# Patient Record
Sex: MALE | Race: OTHER | HISPANIC OR LATINO | Employment: FULL TIME | URBAN - METROPOLITAN AREA
[De-identification: names, ages, dates, MRNs, and addresses within clinical notes are randomized per-mention and may not be internally consistent; named-entity substitution may affect disease eponyms.]

---

## 2019-12-16 LAB
LEFT EYE DIABETIC RETINOPATHY: NORMAL
RIGHT EYE DIABETIC RETINOPATHY: NORMAL

## 2020-03-18 ENCOUNTER — OFFICE VISIT (OUTPATIENT)
Dept: FAMILY MEDICINE CLINIC | Facility: CLINIC | Age: 50
End: 2020-03-18
Payer: COMMERCIAL

## 2020-03-18 VITALS
HEIGHT: 68 IN | DIASTOLIC BLOOD PRESSURE: 102 MMHG | WEIGHT: 235 LBS | BODY MASS INDEX: 35.61 KG/M2 | HEART RATE: 100 BPM | SYSTOLIC BLOOD PRESSURE: 150 MMHG

## 2020-03-18 DIAGNOSIS — Z12.11 SCREEN FOR COLON CANCER: ICD-10-CM

## 2020-03-18 DIAGNOSIS — Z12.5 PROSTATE CANCER SCREENING: ICD-10-CM

## 2020-03-18 DIAGNOSIS — Z23 NEED FOR INFLUENZA VACCINATION: ICD-10-CM

## 2020-03-18 DIAGNOSIS — K21.9 GASTROESOPHAGEAL REFLUX DISEASE WITHOUT ESOPHAGITIS: ICD-10-CM

## 2020-03-18 DIAGNOSIS — E11.42 TYPE 2 DIABETES MELLITUS WITH DIABETIC POLYNEUROPATHY, WITHOUT LONG-TERM CURRENT USE OF INSULIN (HCC): Primary | ICD-10-CM

## 2020-03-18 DIAGNOSIS — R03.0 ELEVATED BLOOD PRESSURE READING: ICD-10-CM

## 2020-03-18 DIAGNOSIS — G63 POLYNEUROPATHY ASSOCIATED WITH UNDERLYING DISEASE (HCC): ICD-10-CM

## 2020-03-18 PROBLEM — G62.9 PERIPHERAL NEUROPATHY: Status: ACTIVE | Noted: 2020-03-18

## 2020-03-18 PROBLEM — E11.9 TYPE 2 DIABETES MELLITUS (HCC): Status: ACTIVE | Noted: 2020-03-18

## 2020-03-18 LAB — SL AMB POCT HEMOGLOBIN AIC: 10.9 (ref ?–6.5)

## 2020-03-18 PROCEDURE — 36416 COLLJ CAPILLARY BLOOD SPEC: CPT | Performed by: FAMILY MEDICINE

## 2020-03-18 PROCEDURE — 90682 RIV4 VACC RECOMBINANT DNA IM: CPT | Performed by: FAMILY MEDICINE

## 2020-03-18 PROCEDURE — 3008F BODY MASS INDEX DOCD: CPT | Performed by: FAMILY MEDICINE

## 2020-03-18 PROCEDURE — 99203 OFFICE O/P NEW LOW 30 MIN: CPT | Performed by: FAMILY MEDICINE

## 2020-03-18 PROCEDURE — 83036 HEMOGLOBIN GLYCOSYLATED A1C: CPT | Performed by: FAMILY MEDICINE

## 2020-03-18 PROCEDURE — 3046F HEMOGLOBIN A1C LEVEL >9.0%: CPT | Performed by: FAMILY MEDICINE

## 2020-03-18 PROCEDURE — 90471 IMMUNIZATION ADMIN: CPT | Performed by: FAMILY MEDICINE

## 2020-03-18 PROCEDURE — 90472 IMMUNIZATION ADMIN EACH ADD: CPT | Performed by: FAMILY MEDICINE

## 2020-03-18 PROCEDURE — 1036F TOBACCO NON-USER: CPT | Performed by: FAMILY MEDICINE

## 2020-03-18 PROCEDURE — 90732 PPSV23 VACC 2 YRS+ SUBQ/IM: CPT | Performed by: FAMILY MEDICINE

## 2020-03-18 RX ORDER — GABAPENTIN 800 MG/1
800 TABLET ORAL DAILY
Qty: 30 TABLET | Refills: 5 | Status: SHIPPED | OUTPATIENT
Start: 2020-03-18 | End: 2020-05-01 | Stop reason: SDUPTHER

## 2020-03-18 RX ORDER — GABAPENTIN 800 MG/1
800 TABLET ORAL DAILY
COMMUNITY
End: 2020-03-18 | Stop reason: SDUPTHER

## 2020-03-18 RX ORDER — GLIPIZIDE 10 MG/1
10 TABLET ORAL
Qty: 60 TABLET | Refills: 5 | Status: SHIPPED | OUTPATIENT
Start: 2020-03-18 | End: 2020-05-01 | Stop reason: SDUPTHER

## 2020-03-18 RX ORDER — GLIPIZIDE 10 MG/1
10 TABLET ORAL
COMMUNITY
End: 2020-03-18 | Stop reason: SDUPTHER

## 2020-03-18 NOTE — PATIENT INSTRUCTIONS
10% - bad control"> 10% - bad control,Hemoglobin A1c (HbA1c) greater than 10% indicating poor diabetic control,Haemoglobin A1c greater than 10% indicating poor diabetic control">   Diabetes Mellitus Type 2 in Adults, Ambulatory Care   GENERAL INFORMATION:   Diabetes mellitus type 2  is a disease that affects how your body uses glucose (sugar)  Insulin helps move sugar out of the blood so it can be used for energy  Normally, when the blood sugar level increases, the pancreas makes more insulin  Type 2 diabetes develops because either the body cannot make enough insulin, or it cannot use the insulin correctly  After many years, your pancreas may stop making insulin  Common symptoms include the following:   · More hunger or thirst than usual     · Frequent urination     · Weight loss without trying     · Blurred vision  Seek immediate care for the following symptoms:   · Severe abdominal pain, or pain that spreads to your back  You may also be vomiting  · Trouble staying awake or focusing    · Shaking or sweating    · Blurred or double vision    · Breath has a fruity, sweet smell    · Breathing is deep and labored, or rapid and shallow    · Heartbeat is fast and weak  Treatment for diabetes mellitus type 2  includes keeping your blood sugar at a normal level  You must eat the right foods, and exercise regularly  You may also need medicine if you cannot control your blood sugar level with nutrition and exercise  Manage diabetes mellitus type 2:   · Check your blood sugar level  You will be taught how to check a small drop of blood in a glucose monitor  Ask your healthcare provider when and how often to check during the day  Ask your healthcare provider what your blood sugar levels should be when you check them  · Keep track of carbohydrates (sugar and starchy foods)  Your blood sugar level can get too high if you eat too many carbohydrates   Your dietitian will help you plan meals and snacks that have the right amount of carbohydrates  · Eat low-fat foods  Some examples are skinless chicken and low-fat milk  · Eat less sodium (salt)  Some examples of high-sodium foods to limit are soy sauce, potato chips, and soup  Do not add salt to food you cook  Limit your use of table salt  · Eat high-fiber foods  Foods that are a good source of fiber include vegetables, whole grain bread, and beans  · Limit alcohol  Alcohol affects your blood sugar level and can make it harder to manage your diabetes  Women should limit alcohol to 1 drink a day  Men should limit alcohol to 2 drinks a day  A drink of alcohol is 12 ounces of beer, 5 ounces of wine, or 1½ ounces of liquor  · Get regular exercise  Exercise can help keep your blood sugar level steady, decrease your risk of heart disease, and help you lose weight  Exercise for at least 30 minutes, 5 days a week  Include muscle strengthening activities 2 days each week  Work with your healthcare provider to create an exercise plan  · Check your feet each day  for injuries or open sores  Ask your healthcare provider for activities you can do if you have an open sore  · Quit smoking  If you smoke, it is never too late to quit  Smoking can worsen the problems that may occur with diabetes  Ask your healthcare provider for information about how to stop smoking if you are having trouble quitting  · Ask about your weight:  Ask healthcare providers if you need to lose weight, and how much to lose  Ask them to help you with a weight loss program  Even a 10 to 15 pound weight loss can help you manage your blood sugar level  · Carry medical alert identification  Wear medical alert jewelry or carry a card that says you have diabetes  Ask your healthcare provider where to get these items  · Ask about vaccines  Diabetes puts you at risk of serious illness if you get the flu, pneumonia, or hepatitis   Ask your healthcare provider if you should get a flu, pneumonia, or hepatitis B vaccine, and when to get the vaccine  Follow up with your healthcare provider as directed:  Write down your questions so you remember to ask them during your visits  CARE AGREEMENT:   You have the right to help plan your care  Learn about your health condition and how it may be treated  Discuss treatment options with your caregivers to decide what care you want to receive  You always have the right to refuse treatment  The above information is an  only  It is not intended as medical advice for individual conditions or treatments  Talk to your doctor, nurse or pharmacist before following any medical regimen to see if it is safe and effective for you  © 2014 [address] is for End User's use only and may not be sold, redistributed or otherwise used for commercial purposes  All illustrations and images included in CareNotes® are the copyrighted property of A D A M , Inc  or Rodolfo Rubio  10% - bad control"> 10% - bad control,Hemoglobin A1c (HbA1c) greater than 10% indicating poor diabetic control,Haemoglobin A1c greater than 10% indicating poor diabetic control">   Diabetes mellitus tipo 2 en adultos, cuidados ambulatorios   INFORMACIÓN GENERAL:   La diabetes mellitus tipo 2 en adultos  es morena enfermedad que afecta la forma en que el cuerpo utiliza la glucosa (azúcar)  La insulina ayuda a extraer el azúcar de la praveena para que pueda usarse en la producción de energía  Generalmente, cuando el nivel de azúcar [place], el páncreas produce más insulina  La diabetes tipo 2 se desarrolla ya sea porque el cuerpo no puede producir suficiente insulina, o no la puede usar correctamente  Después de [employer], dominguez páncreas podría dejar de producir insulina    Síntomas comunes incluyen los siguientes:   · Más hambre o sed de la usual    · Necesidad frecuente de orinar     · Pérdida de peso sin tratar     · Visión borrosa  Artie Salts inmediatos para los siguientes síntomas:   · Dolor abdominal severo o dolor que se propaga hacia dominguez espalda  Es probable que usted también vomite  · Dificultad para permanecer despierto o concentrado    · Temblores o sudoración    · Visión borrosa o doble    · Aliento con olor a frutas o dionne    · Respiración profunda y dificultosa o rápida y superficial    · Ritmo cardíaco rápido y débil  El tratamiento para la diabetes mellitus tipo 2  incluye mantener dominguez nivel de azúcar en el praveena a un rango normal  Usted debe comer los alimentos correctos y ejercitarse con regularidad  Además es probable que necesite medicamentos si no puede controlar dominguez nivel de azúcar en la praveena con nutrición y ejercicio  Maneje la diabetes mellitus tipo 2:   · Revise dominguez nivel de azúcar en la praveena  A usted le enseñarán cómo revisar morena pequeña gota de Kalpesh cobb en un medidor de glucosa  Pregúntele a dominguez proveedor de albert cuándo y con cuánta frecuencia es necesario revisar edison el día  También pregúntele a dominguez proveedor de albert cuáles deberían ser umer niveles de azúcar en la praveena cuando usted se los [place]le[place]  · Mantenga un registro de los carbohidratos (azúcares y almidones)  Dominguez nivel de azúcar en la praveena puede elevarse demasiado si usted come demasiados carbohidratos  Dominguez dietista le ayudará a planear comidas y meriendas que tengan la cantidad correcta de carbohidratos  · Consuma alimentos bajos en grasas  Lenoria Holler son el enoc sin piel y la leche descremada  · Consuma menos sodio (sal)  Algunos ejemplos de alimentos altos en sodio que hay que limitar son la salsa de soya, las homar tostadas y la sopa  No le agregue sal a la comida que usted cocina  Limite dominguez uso de sal de ledezma  · Coma alimentos altos en fibra  Alimentos que son buena lakisha de fibra incluyen los vegetales, el pan integral y los frijoles  · Limite el alcohol    El alcohol afecta dominguez nivel de azúcar en la praveena y puede dificultar el manejo de dominguez diabetes  Las mujeres deben limitar el consumo de alcohol a 1 bebida al día  Los hombres deben limitarlo a 2 debidas al día  Danii bebida de alcohol equivale a 12 onzas de cerveza, 5 onzas de vino o 1½ onzas de licor  · Ejercítese regularmente  El ejercicio puede ayudar a mantener estable dominguez nivel de azúcar en la praveena, al mismo tiempo que disminuye dominguez riesgo de enfermedad cardíaca y le ayuda a perder peso  Ejercítese por al menos 30 minutos, 5 fidelia a la semana  Adia Bumpers de fortalecimiento muscular 2 días a la semana  Colabore con dominguez proveedor de albert para crear un plan de ejercicios  · Revise umer pies a diario  para pauline si tienen heridas o llagas abiertas  Pregúntele a dominguez proveedor de [employer] que usted puede hacer si tiene danii llaga abierta  · Deje de fumar  Si usted fuma, nunca es tarde para dejar de hacerlo  El fumar puede [employer] problemas que puedan ocurrir con la diabetes  Pregúntele a dominguez proveedor de [employer] para aprender a dejar de fumar si usted tiene dificultad para hacerlo  · Pregunte sobre dominguez peso:  Pregúntele a umer proveedores de albert si usted necesita perder peso y cuánto debe perder  Pídales que le ayuden con un programa de perdida de [place]  Incluso perder unas 10 a 15 libras puede ayudarle a manejar dominguez nivel de azúcar en la praveena  · Tenga a mano dominguez identificación de [employer]  Use un brazalete de alerta médica o lleve consigo danii tarjeta que diga que usted tiene diabetes  Pregúntele a dominguez proveedor de albert dónde conseguir estos artículos  · Pregunte sobre vacunas  La diabetes lo pone a usted en riesgo de enfermedad seria si usted se resfría, tiene neumonía o hepatitis  Pregúntele a dominguez proveedor de albert si usted debe ponerse las vacunas contra la gripe, neumonía o hepatitis B, y cuándo ponérselas  Programe danii jimenez con dominguez proveedor de [employer] se le haya indicado:  Anote umer preguntas para que se acuerde de hacerlas edison umer visitas  ACUERDOS SOBRE FARRIS CUIDADO:   Usted tiene el derecho de participar en la planificación de farris cuidado  Aprenda todo lo que pueda sobre farris condición y toan darle tratamiento  Discuta con umer médicos umer opciones de tratamiento para juntos decidir el cuidado que usted quiere recibir  Usted siempre tiene el derecho a rechazar farris tratamiento  Esta información es sólo para uso en educación  Farris intención no es darle un consejo médico sobre enfermedades o tratamientos  Colsulte con farris Leo Lawson farmacéutico antes de seguir cualquier régimen médico para saber si es seguro y efectivo para usted  © 2014 [address] is for End User's use only and may not be sold, redistributed or otherwise used for commercial purposes  All illustrations and images included in CareNotes® are the copyrighted property of A D A M , Inc  or [employer]  Problem List Items Addressed This Visit     Elevated blood pressure reading     Patient does have elevated blood pressure today  Will likely need to start an ACE-inhibitor, but I would recommend that he recheck in approximately 2-4 weeks, and then re-evaluate whether not we need to start the ACE-inhibitor  He only has the 1 blood pressure here that is documented as elevated  Relevant Orders    CBC and differential    Comprehensive metabolic panel    TSH, 3rd generation    GERD (gastroesophageal reflux disease)     Denies any current problems, though he does have the diagnosis  Will watch in the future for any changes  Peripheral neuropathy     Currently on Neurontin for peripheral neuropathy  At this time, he should continue same  Recommend podiatry evaluation in the future  He does have a significant amount of neuropathy bilaterally  This was noted on monofilament exam today           Relevant Medications    gabapentin (NEURONTIN) 800 mg tablet    Other Relevant Orders    Comprehensive metabolic panel    Type 2 diabetes mellitus ([facility] [address] ) - Primary       No results found for: HGBA1C    Patient's A1c by fingerstick was over 10  He has not had any evaluations or treatment changes in the last 2 years, so it is likely that the diabetes just seems to be getting worse  I reviewed with him about increasing medications, verses adjusting medications, verses changing things completely with insulin  At this time, I would recommend that we start on SG LT 2 inhibitor, Jardiance  Will wait till kidney function is back to confirm dosage  Try to limit carbohydrates  Increase exercise           Relevant Medications    glipiZIDE (GLUCOTROL) 10 mg tablet    metFORMIN (GLUCOPHAGE) 1000 MG tablet    Other Relevant Orders    PNEUMOCOCCAL POLYSACCHARIDE VACCINE 23-VALENT =>3YO SQ IM    Comprehensive metabolic panel    Lipid Panel with Direct LDL reflex    Microalbumin / creatinine urine ratio    UA (URINE) with reflex to Scope      Other Visit Diagnoses     Need for influenza vaccination        Relevant Orders    influenza vaccine, [id number], quadrivalent, recombinant, PF, 0 5 mL, for patients 18 yr+ (FLUBLOK)    Screen for colon cancer        Relevant Orders    Ambulatory referral for colonoscopy    Prostate cancer screening        Relevant Orders    PSA, Total Screen

## 2020-03-18 NOTE — ASSESSMENT & PLAN NOTE
No results found for: HGBA1C    Patient's A1c by fingerstick was over 10  He has not had any evaluations or treatment changes in the last 2 years, so it is likely that the diabetes just seems to be getting worse  I reviewed with him about increasing medications, verses adjusting medications, verses changing things completely with insulin  At this time, I would recommend that we start on SG LT 2 inhibitor, Jardiance  Will wait till kidney function is back to confirm dosage  Try to limit carbohydrates  Increase exercise

## 2020-03-18 NOTE — ASSESSMENT & PLAN NOTE
Denies any current problems, though he does have the diagnosis  Will watch in the future for any changes

## 2020-03-18 NOTE — ASSESSMENT & PLAN NOTE
Patient does have elevated blood pressure today  Will likely need to start an ACE-inhibitor, but I would recommend that he recheck in approximately 2-4 weeks, and then re-evaluate whether not we need to start the ACE-inhibitor  He only has the 1 blood pressure here that is documented as elevated

## 2020-03-18 NOTE — PROGRESS NOTES
Assessment and Plan:    Problem List Items Addressed This Visit     Elevated blood pressure reading     Patient does have elevated blood pressure today  Will likely need to start an ACE-inhibitor, but I would recommend that he recheck in approximately 2-4 weeks, and then re-evaluate whether not we need to start the ACE-inhibitor  He only has the 1 blood pressure here that is documented as elevated  Relevant Orders    CBC and differential    Comprehensive metabolic panel    TSH, 3rd generation    GERD (gastroesophageal reflux disease)     Denies any current problems, though he does have the diagnosis  Will watch in the future for any changes  Peripheral neuropathy     Currently on Neurontin for peripheral neuropathy  At this time, he should continue same  Recommend podiatry evaluation in the future  He does have a significant amount of neuropathy bilaterally  This was noted on monofilament exam today  Relevant Medications    gabapentin (NEURONTIN) 800 mg tablet    Other Relevant Orders    Comprehensive metabolic panel    [facility] ) - Primary       No results found for: HGBA1C    Patient's A1c by fingerstick was over 10  He has not had any evaluations or treatment changes in the last 2 years, so it is likely that the diabetes just seems to be getting worse  I reviewed with him about increasing medications, verses adjusting medications, verses changing things completely with insulin  At this time, I would recommend that we start on SG LT 2 inhibitor, Jardiance  Will wait till kidney function is back to confirm dosage  Try to limit carbohydrates  Increase exercise           Relevant Medications    glipiZIDE (GLUCOTROL) 10 mg tablet    metFORMIN (GLUCOPHAGE) 1000 MG tablet    Other Relevant Orders    PNEUMOCOCCAL POLYSACCHARIDE VACCINE 23-VALENT =>1YO SQ IM    Comprehensive metabolic panel    Lipid Panel with Direct LDL reflex    Microalbumin / creatinine urine ratio    UA (URINE) with reflex to Scope      Other Visit Diagnoses     Need for influenza vaccination        Relevant Orders    influenza vaccine, [id number], quadrivalent, recombinant, PF, 0 5 mL, for patients 18 yr+ (FLUBLOK)    Screen for colon cancer        Relevant Orders    Ambulatory referral for colonoscopy    Prostate cancer screening        Relevant Orders    PSA, Total Screen                 Diagnoses and all orders for this visit:    Type 2 diabetes mellitus with diabetic polyneuropathy, without long-term current use of insulin (HCC)  -     PNEUMOCOCCAL POLYSACCHARIDE VACCINE 23-VALENT =>3YO SQ IM  -     Comprehensive metabolic panel; Future  -     Lipid Panel with Direct LDL reflex; Future  -     Microalbumin / creatinine urine ratio; Future  -     UA (URINE) with reflex to Scope  -     glipiZIDE (GLUCOTROL) 10 mg tablet; Take 1 tablet (10 mg total) by mouth 2 (two) times a day before meals  -     metFORMIN (GLUCOPHAGE) 1000 MG tablet; Take 1 tablet (1,000 mg total) by mouth 2 (two) times a day with meals    Elevated blood pressure reading  -     CBC and differential; Future  -     Comprehensive metabolic panel; Future  -     TSH, 3rd generation; Future    Gastroesophageal reflux disease without esophagitis    Polyneuropathy associated with underlying disease ([facility] [address] )  -     Comprehensive metabolic panel; Future  -     gabapentin (NEURONTIN) 800 mg tablet; Take 1 tablet (800 mg total) by mouth daily    Need for influenza vaccination  -     influenza vaccine, [id number], quadrivalent, recombinant, PF, 0 5 mL, for patients 18 yr+ (FLUBLOK)    Screen for colon cancer  -     Ambulatory referral for colonoscopy; Future    Prostate cancer screening  -     PSA, Total Screen; Future    Other orders  -     Discontinue: metFORMIN (GLUCOPHAGE) 1000 MG tablet; Take 1,000 mg by mouth 2 (two) times a day with meals  -     Discontinue: glipiZIDE (GLUCOTROL) 10 mg tablet;  Take 10 mg by mouth 2 (two) times a day before meals  -     Discontinue: gabapentin (NEURONTIN) 800 mg tablet; Take 800 mg by mouth daily              Subjective:      Patient ID: Aida Herbert  is a 44 y o  male  CC:    Chief Complaint   Patient presents with    [facility] [place] [facility]    Diabetes    Hypertension     mjs       HPI:    Patient is here for new patient visit  Patient recently at dentist, and noted significant elevation in BP  He reports this happens when he sees doctors  DM: He is taking meds, but NOT following diet  He reports BS's are 200's (fasting 180's, and up from there)  Metformin, glipizide  Has not tried other medications  Last A1C: Quite a while ago  Last was a 7 he thinks, but sugars are elevated now  Neuropathy: He seems to have problems at HS  No issues during th day  On Gabapentin 800mg at HS  Has a peptic ulcer, and "hernia "  Not on medications for this  Patient moved to [facility] from [facility] after [place]  The following portions of the patient's history were reviewed and updated as appropriate: allergies, current medications, past family history, past medical history, past social history, past surgical history and problem list       Review of Systems   Constitutional: Negative  HENT: Negative  Eyes: Negative  Respiratory: Negative  Cardiovascular: Negative  Gastrointestinal: Negative  Endocrine: Negative  Genitourinary: Negative  Musculoskeletal: Negative  Skin: Negative  Allergic/Immunologic: Negative  Neurological: Negative  Hematological: Negative  Psychiatric/Behavioral: Negative  Data to review:       Objective:    Vitals:    03/18/20 1437   BP: (!) 150/102   Pulse: 100   Weight: 107 kg (235 lb)   Height: 5' 8" (1 727 m)        Physical Exam   Constitutional: He is oriented to person, place, and time  He appears well-developed and well-nourished  No distress  HENT:   Head: Normocephalic and atraumatic     Right Ear: Hearing, tympanic membrane, external ear and ear canal normal    Left Ear: Hearing, tympanic membrane, external ear and ear canal normal    Nose: Nose normal  Right sinus exhibits no maxillary sinus tenderness and no frontal sinus tenderness  Left sinus exhibits no maxillary sinus tenderness and no frontal sinus tenderness  Mouth/Throat: Uvula is midline, oropharynx is clear and moist and mucous membranes are normal  No oropharyngeal exudate  Eyes: Pupils are equal, round, and reactive to light  Conjunctivae and EOM are normal  Lids are everted and swept, no foreign bodies found  Neck: Normal range of motion  Neck supple  Carotid bruit is not present  No tracheal deviation present  No thyromegaly present  Cardiovascular: Normal rate, regular rhythm, normal heart sounds and intact distal pulses  Exam reveals no gallop and no friction rub  Pulses are no weak pulses  No murmur heard  Pulses:       Carotid pulses are 2+ on the right side, and 2+ on the left side  Dorsalis pedis pulses are 2+ on the right side, and 2+ on the left side  Posterior tibial pulses are 2+ on the right side, and 2+ on the left side  Pulmonary/Chest: Effort normal and breath sounds normal  No respiratory distress  He has no wheezes  He has no rales  Abdominal: Soft  Bowel sounds are normal  He exhibits no distension  There is no tenderness  Musculoskeletal:        Feet:    Feet:   Right Foot:   Skin Integrity: Negative for ulcer, skin breakdown, erythema, warmth, callus or dry skin  Left Foot:   Skin Integrity: Negative for ulcer, skin breakdown, erythema, warmth, callus or dry skin  Lymphadenopathy:     He has no cervical adenopathy  Neurological: He is alert and oriented to person, place, and time  A sensory deficit (Per DM foot exam) is present  Coordination normal    Skin: Skin is warm and dry  He is not diaphoretic  Psychiatric: He has a normal mood and affect   His behavior is normal  Judgment and thought content normal    Nursing note and vitals reviewed  BMI Counseling: Body mass index is 35 73 kg/m²  The BMI is above normal  Nutrition recommendations include decreasing portion sizes, encouraging healthy choices of fruits and vegetables, decreasing fast food intake, consuming healthier snacks, limiting drinks that contain sugar, moderation in carbohydrate intake, increasing intake of lean protein, reducing intake of saturated and trans fat and reducing intake of cholesterol  Exercise recommendations include exercising 3-5 times per week  No pharmacotherapy was ordered  Diabetic Foot Exam    Patient's shoes and socks removed  Right Foot/Ankle   Right Foot Inspection  Skin Exam: skin normal and skin intact no dry skin, no warmth, no callus, no erythema, no maceration, no abnormal color, no pre-ulcer, no ulcer and no callus                          Toe Exam: ROM and strength within normal limits  Sensory       Monofilament testing: diminished  Vascular  Capillary refills: < 3 seconds  The right DP pulse is 2+  The right PT pulse is 2+  Left Foot/Ankle  Left Foot Inspection  Skin Exam: skin normal and skin intactno dry skin, no warmth, no erythema, no maceration, normal color, no pre-ulcer, no ulcer and no callus                         Toe Exam: ROM and strength within normal limits                   Sensory       Monofilament: diminished  Vascular  Capillary refills: < 3 seconds  The left DP pulse is 2+  The left PT pulse is 2+  Assign Risk Category:  No deformity present; Loss of protective sensation;  No weak pulses       Risk: 1

## 2020-03-18 NOTE — ASSESSMENT & PLAN NOTE
Currently on Neurontin for peripheral neuropathy  At this time, he should continue same  Recommend podiatry evaluation in the future  He does have a significant amount of neuropathy bilaterally  This was noted on monofilament exam today

## 2020-03-19 ENCOUNTER — TELEPHONE (OUTPATIENT)
Dept: ADMINISTRATIVE | Facility: OTHER | Age: 50
End: 2020-03-19

## 2020-03-19 NOTE — TELEPHONE ENCOUNTER
Upon review of the In Basket request we were able to locate, review, and update the patient chart as requested for Diabetic Eye Exam     Any additional questions or concerns should be emailed to the Practice Liaisons via [email or web address] email, please do not reply via In Basket      Thank you  Bull Escobar

## 2020-03-19 NOTE — LETTER
Diabetic Eye Exam Form    Date Requested: 20  Patient: Isreal Carrillo  Patient : 1970   Referring Provider: Jose Dee MD    Dilated Retinal Exam, Optomap-Iris Exam, or Fundus Photography Done         Yes ([employer] one above)         No     Date of Diabetic Eye Exam ______________________________  Left Eye      Exam did show retinopathy    Exam did not show retinopathy         Mild       Moderate       None       Proliferative       Severe     Right Eye     Exam did show retinopathy    Exam did not show retinopathy         Mild       Moderate       None       Proliferative       Severe     Comments __________________________________________________________    Practice Providing Exam ______________________________________________    Exam Performed By (print name) _______________________________________      Provider Signature ___________________________________________________      These reports are needed for  compliance    Please fax this completed form and a copy of the Diabetic Eye Exam report to our office located at [place] [address] as soon as possible to [phone] perico Felton: Phone [phone]    We thank you for your assistance in treating our mutual patient

## 2020-03-19 NOTE — TELEPHONE ENCOUNTER
Upon review of the In Basket request and the patient's chart, initial outreach has been made via fax, please see Contacts section for details  A second outreach attempt will be made within 3 business days      Thank you  Nayla Guaman

## 2020-04-01 ENCOUNTER — APPOINTMENT (OUTPATIENT)
Dept: LAB | Facility: MEDICAL CENTER | Age: 50
End: 2020-04-01
Payer: COMMERCIAL

## 2020-04-01 DIAGNOSIS — Z12.5 PROSTATE CANCER SCREENING: ICD-10-CM

## 2020-04-01 DIAGNOSIS — G63 POLYNEUROPATHY ASSOCIATED WITH UNDERLYING DISEASE (HCC): ICD-10-CM

## 2020-04-01 DIAGNOSIS — D72.829 LEUKOCYTOSIS, UNSPECIFIED TYPE: Primary | ICD-10-CM

## 2020-04-01 DIAGNOSIS — E11.42 TYPE 2 DIABETES MELLITUS WITH DIABETIC POLYNEUROPATHY, WITHOUT LONG-TERM CURRENT USE OF INSULIN (HCC): ICD-10-CM

## 2020-04-01 DIAGNOSIS — R03.0 ELEVATED BLOOD PRESSURE READING: ICD-10-CM

## 2020-04-01 LAB
ALBUMIN SERPL BCP-MCNC: 3.9 G/DL (ref 3.5–5)
ALP SERPL-CCNC: 89 U/L (ref 46–116)
ALT SERPL W P-5'-P-CCNC: 48 U/L (ref 12–78)
ANION GAP SERPL CALCULATED.3IONS-SCNC: 3 MMOL/L (ref 4–13)
AST SERPL W P-5'-P-CCNC: 14 U/L (ref 5–45)
BASOPHILS # BLD AUTO: 0.14 THOUSANDS/ΜL (ref 0–0.1)
BASOPHILS NFR BLD AUTO: 1 % (ref 0–1)
BILIRUB SERPL-MCNC: 0.65 MG/DL (ref 0.2–1)
BUN SERPL-MCNC: 12 MG/DL (ref 5–25)
CALCIUM SERPL-MCNC: 9.5 MG/DL (ref 8.3–10.1)
CHLORIDE SERPL-SCNC: 100 MMOL/L (ref 100–108)
CHOLEST SERPL-MCNC: 177 MG/DL (ref 50–200)
CO2 SERPL-SCNC: 31 MMOL/L (ref 21–32)
CREAT SERPL-MCNC: 0.89 MG/DL (ref 0.6–1.3)
EOSINOPHIL # BLD AUTO: 0.15 THOUSAND/ΜL (ref 0–0.61)
EOSINOPHIL NFR BLD AUTO: 1 % (ref 0–6)
ERYTHROCYTE [DISTWIDTH] IN BLOOD BY AUTOMATED COUNT: 12.6 % (ref 11.6–15.1)
GFR SERPL CREATININE-BSD FRML MDRD: 99 ML/MIN/1.73SQ M
GLUCOSE P FAST SERPL-MCNC: 200 MG/DL (ref 65–99)
HCT VFR BLD AUTO: 51.4 % (ref 36.5–49.3)
HDLC SERPL-MCNC: 40 MG/DL
HGB BLD-MCNC: 16.8 G/DL (ref 12–17)
IMM GRANULOCYTES # BLD AUTO: 0.12 THOUSAND/UL (ref 0–0.2)
IMM GRANULOCYTES NFR BLD AUTO: 1 % (ref 0–2)
LDLC SERPL CALC-MCNC: 95 MG/DL (ref 0–100)
LYMPHOCYTES # BLD AUTO: 3.22 THOUSANDS/ΜL (ref 0.6–4.47)
LYMPHOCYTES NFR BLD AUTO: 26 % (ref 14–44)
MCH RBC QN AUTO: 26.5 PG (ref 26.8–34.3)
MCHC RBC AUTO-ENTMCNC: 32.7 G/DL (ref 31.4–37.4)
MCV RBC AUTO: 81 FL (ref 82–98)
MONOCYTES # BLD AUTO: 0.77 THOUSAND/ΜL (ref 0.17–1.22)
MONOCYTES NFR BLD AUTO: 6 % (ref 4–12)
NEUTROPHILS # BLD AUTO: 8.14 THOUSANDS/ΜL (ref 1.85–7.62)
NEUTS SEG NFR BLD AUTO: 65 % (ref 43–75)
NRBC BLD AUTO-RTO: 0 /100 WBCS
PLATELET # BLD AUTO: 265 THOUSANDS/UL (ref 149–390)
PMV BLD AUTO: 9.9 FL (ref 8.9–12.7)
POTASSIUM SERPL-SCNC: 4.6 MMOL/L (ref 3.5–5.3)
PROT SERPL-MCNC: 7.4 G/DL (ref 6.4–8.2)
PSA SERPL-MCNC: 0.4 NG/ML (ref 0–4)
RBC # BLD AUTO: 6.34 MILLION/UL (ref 3.88–5.62)
SODIUM SERPL-SCNC: 134 MMOL/L (ref 136–145)
TRIGL SERPL-MCNC: 212 MG/DL
TSH SERPL DL<=0.05 MIU/L-ACNC: 2.41 UIU/ML (ref 0.36–3.74)
WBC # BLD AUTO: 12.54 THOUSAND/UL (ref 4.31–10.16)

## 2020-04-01 PROCEDURE — G0103 PSA SCREENING: HCPCS

## 2020-04-01 PROCEDURE — 36415 COLL VENOUS BLD VENIPUNCTURE: CPT

## 2020-04-01 PROCEDURE — 80053 COMPREHEN METABOLIC PANEL: CPT

## 2020-04-01 PROCEDURE — 85025 COMPLETE CBC W/AUTO DIFF WBC: CPT

## 2020-04-01 PROCEDURE — 84443 ASSAY THYROID STIM HORMONE: CPT

## 2020-04-01 PROCEDURE — 80061 LIPID PANEL: CPT

## 2020-04-03 ENCOUNTER — TELEPHONE (OUTPATIENT)
Dept: FAMILY MEDICINE CLINIC | Facility: CLINIC | Age: 50
End: 2020-04-03

## 2020-04-03 DIAGNOSIS — E11.42 TYPE 2 DIABETES MELLITUS WITH DIABETIC POLYNEUROPATHY, WITHOUT LONG-TERM CURRENT USE OF INSULIN (HCC): Primary | ICD-10-CM

## 2020-04-13 ENCOUNTER — TELEMEDICINE (OUTPATIENT)
Dept: FAMILY MEDICINE CLINIC | Facility: CLINIC | Age: 50
End: 2020-04-13
Payer: COMMERCIAL

## 2020-04-13 DIAGNOSIS — J01.90 ACUTE SINUSITIS, RECURRENCE NOT SPECIFIED, UNSPECIFIED LOCATION: Primary | ICD-10-CM

## 2020-04-13 DIAGNOSIS — R47.01 DYSNOMIA: ICD-10-CM

## 2020-04-13 DIAGNOSIS — E11.42 TYPE 2 DIABETES MELLITUS WITH DIABETIC POLYNEUROPATHY, WITHOUT LONG-TERM CURRENT USE OF INSULIN (HCC): ICD-10-CM

## 2020-04-13 DIAGNOSIS — J30.9 ALLERGIC RHINITIS, UNSPECIFIED SEASONALITY, UNSPECIFIED TRIGGER: ICD-10-CM

## 2020-04-13 PROCEDURE — 99214 OFFICE O/P EST MOD 30 MIN: CPT | Performed by: FAMILY MEDICINE

## 2020-04-13 RX ORDER — CEPHALEXIN 500 MG/1
500 CAPSULE ORAL EVERY 8 HOURS SCHEDULED
Qty: 30 CAPSULE | Refills: 0 | Status: SHIPPED | OUTPATIENT
Start: 2020-04-13 | End: 2020-04-23

## 2020-04-13 RX ORDER — AZELASTINE 1 MG/ML
2 SPRAY, METERED NASAL 2 TIMES DAILY
Qty: 30 ML | Refills: 3 | Status: SHIPPED | OUTPATIENT
Start: 2020-04-13 | End: 2020-06-10

## 2020-04-17 ENCOUNTER — TELEPHONE (OUTPATIENT)
Dept: FAMILY MEDICINE CLINIC | Facility: CLINIC | Age: 50
End: 2020-04-17

## 2020-05-01 DIAGNOSIS — E11.42 TYPE 2 DIABETES MELLITUS WITH DIABETIC POLYNEUROPATHY, WITHOUT LONG-TERM CURRENT USE OF INSULIN (HCC): ICD-10-CM

## 2020-05-01 DIAGNOSIS — G63 POLYNEUROPATHY ASSOCIATED WITH UNDERLYING DISEASE (HCC): ICD-10-CM

## 2020-05-05 RX ORDER — GLIPIZIDE 10 MG/1
10 TABLET ORAL
Qty: 60 TABLET | Refills: 1 | Status: SHIPPED | OUTPATIENT
Start: 2020-05-05 | End: 2020-12-11

## 2020-05-05 RX ORDER — GABAPENTIN 800 MG/1
800 TABLET ORAL DAILY
Qty: 30 TABLET | Refills: 1 | Status: SHIPPED | OUTPATIENT
Start: 2020-05-05 | End: 2020-12-11

## 2020-06-08 DIAGNOSIS — E11.42 TYPE 2 DIABETES MELLITUS WITH DIABETIC POLYNEUROPATHY, WITHOUT LONG-TERM CURRENT USE OF INSULIN (HCC): ICD-10-CM

## 2020-06-10 ENCOUNTER — TELEMEDICINE (OUTPATIENT)
Dept: FAMILY MEDICINE CLINIC | Facility: CLINIC | Age: 50
End: 2020-06-10
Payer: COMMERCIAL

## 2020-06-10 DIAGNOSIS — R03.0 ELEVATED BLOOD PRESSURE READING: ICD-10-CM

## 2020-06-10 DIAGNOSIS — K21.9 GASTROESOPHAGEAL REFLUX DISEASE WITHOUT ESOPHAGITIS: ICD-10-CM

## 2020-06-10 DIAGNOSIS — N52.8 OTHER MALE ERECTILE DYSFUNCTION: ICD-10-CM

## 2020-06-10 DIAGNOSIS — E11.42 TYPE 2 DIABETES MELLITUS WITH DIABETIC POLYNEUROPATHY, WITHOUT LONG-TERM CURRENT USE OF INSULIN (HCC): Primary | ICD-10-CM

## 2020-06-10 PROBLEM — N52.9 ED (ERECTILE DYSFUNCTION): Status: ACTIVE | Noted: 2020-06-10

## 2020-06-10 PROCEDURE — 99214 OFFICE O/P EST MOD 30 MIN: CPT | Performed by: FAMILY MEDICINE

## 2020-10-01 DIAGNOSIS — E11.42 TYPE 2 DIABETES MELLITUS WITH DIABETIC POLYNEUROPATHY, WITHOUT LONG-TERM CURRENT USE OF INSULIN (HCC): ICD-10-CM

## 2020-10-07 ENCOUNTER — LAB (OUTPATIENT)
Dept: LAB | Facility: MEDICAL CENTER | Age: 50
End: 2020-10-07
Payer: COMMERCIAL

## 2020-10-07 DIAGNOSIS — N52.8 OTHER MALE ERECTILE DYSFUNCTION: ICD-10-CM

## 2020-10-07 DIAGNOSIS — D72.829 LEUKOCYTOSIS, UNSPECIFIED TYPE: ICD-10-CM

## 2020-10-07 DIAGNOSIS — E11.42 TYPE 2 DIABETES MELLITUS WITH DIABETIC POLYNEUROPATHY, WITHOUT LONG-TERM CURRENT USE OF INSULIN (HCC): ICD-10-CM

## 2020-10-07 LAB
ALBUMIN SERPL BCP-MCNC: 3.7 G/DL (ref 3.5–5)
ALP SERPL-CCNC: 79 U/L (ref 46–116)
ALT SERPL W P-5'-P-CCNC: 47 U/L (ref 12–78)
ANION GAP SERPL CALCULATED.3IONS-SCNC: 3 MMOL/L (ref 4–13)
AST SERPL W P-5'-P-CCNC: 21 U/L (ref 5–45)
BASOPHILS # BLD AUTO: 0.1 THOUSANDS/ΜL (ref 0–0.1)
BASOPHILS NFR BLD AUTO: 1 % (ref 0–1)
BILIRUB SERPL-MCNC: 0.64 MG/DL (ref 0.2–1)
BUN SERPL-MCNC: 13 MG/DL (ref 5–25)
CALCIUM SERPL-MCNC: 10.1 MG/DL (ref 8.3–10.1)
CHLORIDE SERPL-SCNC: 104 MMOL/L (ref 100–108)
CO2 SERPL-SCNC: 30 MMOL/L (ref 21–32)
CREAT SERPL-MCNC: 0.9 MG/DL (ref 0.6–1.3)
CREAT UR-MCNC: 104 MG/DL
EOSINOPHIL # BLD AUTO: 0.13 THOUSAND/ΜL (ref 0–0.61)
EOSINOPHIL NFR BLD AUTO: 1 % (ref 0–6)
ERYTHROCYTE [DISTWIDTH] IN BLOOD BY AUTOMATED COUNT: 12.7 % (ref 11.6–15.1)
EST. AVERAGE GLUCOSE BLD GHB EST-MCNC: 203 MG/DL
GFR SERPL CREATININE-BSD FRML MDRD: 98 ML/MIN/1.73SQ M
GLUCOSE P FAST SERPL-MCNC: 128 MG/DL (ref 65–99)
HBA1C MFR BLD: 8.7 %
HCT VFR BLD AUTO: 52 % (ref 36.5–49.3)
HGB BLD-MCNC: 16.5 G/DL (ref 12–17)
IMM GRANULOCYTES # BLD AUTO: 0.11 THOUSAND/UL (ref 0–0.2)
IMM GRANULOCYTES NFR BLD AUTO: 1 % (ref 0–2)
LYMPHOCYTES # BLD AUTO: 2.54 THOUSANDS/ΜL (ref 0.6–4.47)
LYMPHOCYTES NFR BLD AUTO: 25 % (ref 14–44)
MCH RBC QN AUTO: 26.6 PG (ref 26.8–34.3)
MCHC RBC AUTO-ENTMCNC: 31.7 G/DL (ref 31.4–37.4)
MCV RBC AUTO: 84 FL (ref 82–98)
MICROALBUMIN UR-MCNC: 8.2 MG/L (ref 0–20)
MICROALBUMIN/CREAT 24H UR: 8 MG/G CREATININE (ref 0–30)
MONOCYTES # BLD AUTO: 0.6 THOUSAND/ΜL (ref 0.17–1.22)
MONOCYTES NFR BLD AUTO: 6 % (ref 4–12)
NEUTROPHILS # BLD AUTO: 6.77 THOUSANDS/ΜL (ref 1.85–7.62)
NEUTS SEG NFR BLD AUTO: 66 % (ref 43–75)
NRBC BLD AUTO-RTO: 0 /100 WBCS
PLATELET # BLD AUTO: 258 THOUSANDS/UL (ref 149–390)
PMV BLD AUTO: 10.3 FL (ref 8.9–12.7)
POTASSIUM SERPL-SCNC: 4.7 MMOL/L (ref 3.5–5.3)
PROT SERPL-MCNC: 7.4 G/DL (ref 6.4–8.2)
RBC # BLD AUTO: 6.21 MILLION/UL (ref 3.88–5.62)
SODIUM SERPL-SCNC: 137 MMOL/L (ref 136–145)
WBC # BLD AUTO: 10.25 THOUSAND/UL (ref 4.31–10.16)

## 2020-10-07 PROCEDURE — 84403 ASSAY OF TOTAL TESTOSTERONE: CPT

## 2020-10-07 PROCEDURE — 80053 COMPREHEN METABOLIC PANEL: CPT

## 2020-10-07 PROCEDURE — 83036 HEMOGLOBIN GLYCOSYLATED A1C: CPT

## 2020-10-07 PROCEDURE — 85025 COMPLETE CBC W/AUTO DIFF WBC: CPT

## 2020-10-07 PROCEDURE — 36415 COLL VENOUS BLD VENIPUNCTURE: CPT

## 2020-10-07 PROCEDURE — 82043 UR ALBUMIN QUANTITATIVE: CPT

## 2020-10-07 PROCEDURE — 82570 ASSAY OF URINE CREATININE: CPT

## 2020-10-08 LAB — TESTOST SERPL-MCNC: 236 NG/DL (ref 95–948)

## 2020-10-14 ENCOUNTER — OFFICE VISIT (OUTPATIENT)
Dept: FAMILY MEDICINE CLINIC | Facility: CLINIC | Age: 50
End: 2020-10-14
Payer: COMMERCIAL

## 2020-10-14 VITALS
HEART RATE: 104 BPM | HEIGHT: 68 IN | DIASTOLIC BLOOD PRESSURE: 92 MMHG | SYSTOLIC BLOOD PRESSURE: 126 MMHG | BODY MASS INDEX: 35.46 KG/M2 | WEIGHT: 234 LBS | TEMPERATURE: 97.3 F

## 2020-10-14 DIAGNOSIS — R03.0 ELEVATED BLOOD PRESSURE READING: ICD-10-CM

## 2020-10-14 DIAGNOSIS — R00.2 PALPITATION: ICD-10-CM

## 2020-10-14 DIAGNOSIS — G63 POLYNEUROPATHY ASSOCIATED WITH UNDERLYING DISEASE (HCC): ICD-10-CM

## 2020-10-14 DIAGNOSIS — Z23 NEED FOR INFLUENZA VACCINATION: ICD-10-CM

## 2020-10-14 DIAGNOSIS — N52.8 OTHER MALE ERECTILE DYSFUNCTION: ICD-10-CM

## 2020-10-14 DIAGNOSIS — E11.42 TYPE 2 DIABETES MELLITUS WITH DIABETIC POLYNEUROPATHY, WITHOUT LONG-TERM CURRENT USE OF INSULIN (HCC): Primary | ICD-10-CM

## 2020-10-14 PROCEDURE — 90471 IMMUNIZATION ADMIN: CPT | Performed by: FAMILY MEDICINE

## 2020-10-14 PROCEDURE — 99214 OFFICE O/P EST MOD 30 MIN: CPT | Performed by: FAMILY MEDICINE

## 2020-10-14 PROCEDURE — 90682 RIV4 VACC RECOMBINANT DNA IM: CPT | Performed by: FAMILY MEDICINE

## 2020-10-14 PROCEDURE — 93000 ELECTROCARDIOGRAM COMPLETE: CPT | Performed by: FAMILY MEDICINE

## 2020-12-10 ENCOUNTER — HOSPITAL ENCOUNTER (OUTPATIENT)
Dept: NON INVASIVE DIAGNOSTICS | Facility: HOSPITAL | Age: 50
Discharge: HOME | End: 2020-12-10
Payer: COMMERCIAL

## 2020-12-10 ENCOUNTER — OFFICE VISIT (OUTPATIENT)
Dept: FAMILY MEDICINE CLINIC | Facility: CLINIC | Age: 50
End: 2020-12-10
Payer: COMMERCIAL

## 2020-12-10 VITALS
WEIGHT: 237 LBS | DIASTOLIC BLOOD PRESSURE: 96 MMHG | TEMPERATURE: 97.6 F | HEART RATE: 120 BPM | SYSTOLIC BLOOD PRESSURE: 144 MMHG | BODY MASS INDEX: 35.92 KG/M2 | HEIGHT: 68 IN

## 2020-12-10 DIAGNOSIS — R03.0 ELEVATED BLOOD PRESSURE READING: ICD-10-CM

## 2020-12-10 DIAGNOSIS — I82.403 ACUTE DEEP VEIN THROMBOSIS (DVT) OF BOTH LOWER EXTREMITIES, UNSPECIFIED VEIN (HCC): ICD-10-CM

## 2020-12-10 DIAGNOSIS — M79.89 LEG SWELLING: ICD-10-CM

## 2020-12-10 DIAGNOSIS — E11.42 TYPE 2 DIABETES MELLITUS WITH DIABETIC POLYNEUROPATHY, WITHOUT LONG-TERM CURRENT USE OF INSULIN (HCC): ICD-10-CM

## 2020-12-10 DIAGNOSIS — R00.2 PALPITATIONS: ICD-10-CM

## 2020-12-10 DIAGNOSIS — R00.2 PALPITATIONS: Primary | ICD-10-CM

## 2020-12-10 LAB
SL AMB POCT GLUCOSE BLD: 393
SL AMB POCT HEMOGLOBIN AIC: 9.8 (ref ?–6.5)

## 2020-12-10 PROCEDURE — 99214 OFFICE O/P EST MOD 30 MIN: CPT | Performed by: FAMILY MEDICINE

## 2020-12-10 PROCEDURE — 93970 EXTREMITY STUDY: CPT

## 2020-12-10 PROCEDURE — 93000 ELECTROCARDIOGRAM COMPLETE: CPT | Performed by: FAMILY MEDICINE

## 2020-12-10 PROCEDURE — 82948 REAGENT STRIP/BLOOD GLUCOSE: CPT | Performed by: FAMILY MEDICINE

## 2020-12-10 PROCEDURE — 83036 HEMOGLOBIN GLYCOSYLATED A1C: CPT | Performed by: FAMILY MEDICINE

## 2020-12-10 PROCEDURE — 93970 EXTREMITY STUDY: CPT | Performed by: SURGERY

## 2020-12-11 DIAGNOSIS — G63 POLYNEUROPATHY ASSOCIATED WITH UNDERLYING DISEASE (HCC): ICD-10-CM

## 2020-12-11 DIAGNOSIS — E11.42 TYPE 2 DIABETES MELLITUS WITH DIABETIC POLYNEUROPATHY, WITHOUT LONG-TERM CURRENT USE OF INSULIN (HCC): ICD-10-CM

## 2020-12-11 RX ORDER — GABAPENTIN 800 MG/1
800 TABLET ORAL DAILY
Qty: 30 TABLET | Refills: 1 | Status: SHIPPED | OUTPATIENT
Start: 2020-12-11 | End: 2021-02-19

## 2020-12-11 RX ORDER — GLIPIZIDE 10 MG/1
10 TABLET ORAL
Qty: 60 TABLET | Refills: 1 | Status: SHIPPED | OUTPATIENT
Start: 2020-12-11 | End: 2021-02-19

## 2020-12-23 ENCOUNTER — HOSPITAL ENCOUNTER (OUTPATIENT)
Dept: NON INVASIVE DIAGNOSTICS | Facility: HOSPITAL | Age: 50
Discharge: HOME | End: 2020-12-23
Payer: COMMERCIAL

## 2020-12-23 DIAGNOSIS — R00.2 PALPITATIONS: ICD-10-CM

## 2020-12-23 PROCEDURE — 93226 XTRNL ECG REC<48 HR SCAN A/R: CPT

## 2020-12-23 PROCEDURE — 93225 XTRNL ECG REC<48 HRS REC: CPT

## 2021-01-06 DIAGNOSIS — E11.42 TYPE 2 DIABETES MELLITUS WITH DIABETIC POLYNEUROPATHY, WITHOUT LONG-TERM CURRENT USE OF INSULIN (HCC): ICD-10-CM

## 2021-01-20 DIAGNOSIS — E11.42 TYPE 2 DIABETES MELLITUS WITH DIABETIC POLYNEUROPATHY, WITHOUT LONG-TERM CURRENT USE OF INSULIN (HCC): ICD-10-CM

## 2021-01-28 DIAGNOSIS — E11.42 TYPE 2 DIABETES MELLITUS WITH DIABETIC POLYNEUROPATHY, WITHOUT LONG-TERM CURRENT USE OF INSULIN (HCC): ICD-10-CM

## 2021-02-04 PROCEDURE — 93227 XTRNL ECG REC<48 HR R&I: CPT | Performed by: INTERNAL MEDICINE

## 2021-02-18 DIAGNOSIS — E11.42 TYPE 2 DIABETES MELLITUS WITH DIABETIC POLYNEUROPATHY, WITHOUT LONG-TERM CURRENT USE OF INSULIN (HCC): ICD-10-CM

## 2021-02-18 DIAGNOSIS — G63 POLYNEUROPATHY ASSOCIATED WITH UNDERLYING DISEASE (HCC): ICD-10-CM

## 2021-02-19 RX ORDER — GLIPIZIDE 10 MG/1
10 TABLET ORAL
Qty: 60 TABLET | Refills: 1 | Status: SHIPPED | OUTPATIENT
Start: 2021-02-19 | End: 2021-04-22

## 2021-02-19 RX ORDER — GABAPENTIN 800 MG/1
800 TABLET ORAL DAILY
Qty: 30 TABLET | Refills: 1 | Status: SHIPPED | OUTPATIENT
Start: 2021-02-19 | End: 2021-05-04 | Stop reason: SDUPTHER

## 2021-02-22 ENCOUNTER — TELEMEDICINE (OUTPATIENT)
Dept: FAMILY MEDICINE CLINIC | Facility: CLINIC | Age: 51
End: 2021-02-22
Payer: COMMERCIAL

## 2021-02-22 DIAGNOSIS — Z71.89 EDUCATED ABOUT COVID-19 VIRUS INFECTION: ICD-10-CM

## 2021-02-22 DIAGNOSIS — N52.8 OTHER MALE ERECTILE DYSFUNCTION: Primary | ICD-10-CM

## 2021-02-22 PROCEDURE — 99213 OFFICE O/P EST LOW 20 MIN: CPT | Performed by: FAMILY MEDICINE

## 2021-02-22 RX ORDER — TADALAFIL 10 MG/1
TABLET ORAL
Qty: 6 TABLET | Refills: 0 | Status: SHIPPED | OUTPATIENT
Start: 2021-02-22 | End: 2021-04-26 | Stop reason: SDUPTHER

## 2021-02-22 NOTE — ASSESSMENT & PLAN NOTE
Reviewed oral options for treatment  Recommend trial cialis, with directions of 1 x2, then 2x2 if no help  Could consider adding to pump    If no help overall, Uro eval

## 2021-02-22 NOTE — PROGRESS NOTES
Attempted to contact patient at 14:50  No answer  Called at 15:13:  Not available  Virtual Regular Visit      Assessment/Plan:    Problem List Items Addressed This Visit     ED (erectile dysfunction) - Primary     Reviewed oral options for treatment  Recommend trial cialis, with directions of 1 x2, then 2x2 if no help  Could consider adding to pump  If no help overall, Uro eval          Relevant Medications    tadalafil (CIALIS) 10 MG tablet      Other Visit Diagnoses     Educated about COVID-19 virus infection        Reviewed COVID vaccine  Reason for visit is   Chief Complaint   Patient presents with    Virtual Regular Visit     He wants to discuss the Covid vaccine and ED   mjs    Erectile Dysfunction    Virtual Regular Visit        Encounter provider Sher Carbajal MD    Provider located at [address] [place] [address] [address]      Recent Visits  No visits were found meeting these conditions  Showing recent visits within past 7 days and meeting all other requirements     Today's Visits  Date Type Provider Dept   02/22/21 Telemedicine Sher Carbajal MD [facility]   Showing today's visits and meeting all other requirements     Future Appointments  No visits were found meeting these conditions  Showing future appointments within next 150 days and meeting all other requirements        The patient was identified by name and date of birth  Lisette Griffiths  was informed that this is a telemedicine visit and that the visit is being conducted through [employer] and patient was informed that this is not a secure, HIPAA-compliant platform  He agrees to proceed     My office door was closed  No one else was in the room  He acknowledged consent and understanding of privacy and security of the video platform  The patient has agreed to participate and understands they can discontinue the visit at any time      Patient is aware this is a billable service  Subjective  Fidencio Handler  is a 44 y o  male   Chief Complaint   Patient presents with    Virtual Regular Visit     He wants to discuss the Covid vaccine and ED   mjs    Erectile Dysfunction    Virtual Regular Visit        ED pump is not working  Has had a decrease in drive  He is a bit concerned  The pump is not holding erection at all  He wondered about other treatment options  He states that he deffinately interesed, just that his body is not responding  Past Medical History:   Diagnosis Date    Diabetes mellitus ([address] )     Hernia of abdominal cavity     Hypertension     Ulcer of esophagus        Past Surgical History:   Procedure Laterality Date    CHOLECYSTECTOMY      VASECTOMY         Current Outpatient Medications   Medication Sig Dispense Refill    gabapentin (NEURONTIN) 800 mg tablet TAKE 1 TABLET (800 MG TOTAL) BY MOUTH DAILY 30 tablet 1    glipiZIDE (GLUCOTROL) 10 mg tablet TAKE 1 TABLET (10 MG TOTAL) BY MOUTH 2 (TWO) TIMES A DAY BEFORE MEALS 60 tablet 1    metFORMIN (GLUCOPHAGE) 1000 MG tablet TAKE 1 TABLET BY MOUTH TWICE A DAY WITH MEALS 60 tablet 2    Empagliflozin (Jardiance) 25 MG TABS Take 1 tablet (25 mg total) by mouth every morning 90 tablet 3    sitaGLIPtin (JANUVIA) 100 mg tablet Take 1 tablet (100 mg total) by mouth daily (Patient not taking: Reported on 2/22/2021) 90 tablet 1    tadalafil (CIALIS) 10 MG tablet Take 1 on 2 separate occasions, and if no help, take 2 on 2 separate occasions  6 tablet 0     No current facility-administered medications for this visit  No Known Allergies    Review of Systems   Constitutional: Negative  HENT: Negative  Eyes: Negative  Respiratory: Negative  Cardiovascular: Negative  Gastrointestinal: Negative  Endocrine: Negative  Genitourinary:        Per HPI   Musculoskeletal: Negative  Skin: Negative  Allergic/Immunologic: Negative  Neurological: Negative  Hematological: Negative  Psychiatric/Behavioral: Negative  Video Exam    Vitals:       Physical Exam  Nursing note reviewed  Constitutional:       General: He is not in acute distress  Appearance: He is well-developed  HENT:      Head: Normocephalic and atraumatic  Pulmonary:      Effort: Pulmonary effort is normal       Breath sounds: Normal breath sounds  I spent 20 minutes directly with the patient during this visit      Josephine Prasad  acknowledges that he has consented to an online visit or consultation  He understands that the online visit is based solely on information provided by him, and that, in the absence of a face-to-face physical evaluation by the physician, the diagnosis he receives is both limited and provisional in terms of accuracy and completeness  This is not intended to replace a full medical face-to-face evaluation by the physician  Christina Baca  understands and accepts these terms

## 2021-02-22 NOTE — PATIENT INSTRUCTIONS
Problem List Items Addressed This Visit     ED (erectile dysfunction) - Primary     Reviewed oral options for treatment  Recommend trial cialis, with directions of 1 x2, then 2x2 if no help  Could consider adding to pump  If no help overall, Uro eval          Relevant Medications    tadalafil (CIALIS) 10 MG tablet      Other Visit Diagnoses     Educated about COVID-19 virus infection        Reviewed COVID vaccine  COVID 19 Instructions    Myah Lamas  was advised to limit contact with others to essential tasks such as getting food, medications, and medical care  Proper handwashing reviewed, and Hand sanitzer when washing is not available  If the patient develops symptoms of COVID 19, the patient should call the office as soon as possible  For [id number] Flu season, it is strongly recommended that Flu Vaccinations be obtained  Please try to download Google Duo  Once you do download this on your phone, you will be prompted to add your phone number to the account  After that, he should receive a text from [employer], and use that code to verify your phone number  After that, you should be able to use Google Duo to receive and make video calls  Please download Microsoft Teams to your phone or computer  We will be transitioning to this platform for Video Visits  Instructions for downloading this are available from the office  We are committed to getting you vaccinated as soon as possible and will be closely following CDC and [facility]  guidelines as they are released and revised  Please refer to our COVID-19 vaccine webpage for the most up to date information on the vaccine and our distribution efforts  KosherNames tn    Please get labs in March

## 2021-02-23 DIAGNOSIS — E11.42 TYPE 2 DIABETES MELLITUS WITH DIABETIC POLYNEUROPATHY, WITHOUT LONG-TERM CURRENT USE OF INSULIN (HCC): ICD-10-CM

## 2021-03-10 DIAGNOSIS — Z23 ENCOUNTER FOR IMMUNIZATION: ICD-10-CM

## 2021-03-15 ENCOUNTER — IMMUNIZATIONS (OUTPATIENT)
Dept: FAMILY MEDICINE CLINIC | Facility: HOSPITAL | Age: 51
End: 2021-03-15

## 2021-03-15 DIAGNOSIS — Z23 ENCOUNTER FOR IMMUNIZATION: Primary | ICD-10-CM

## 2021-03-15 PROCEDURE — 91300 SARS-COV-2 / COVID-19 MRNA VACCINE (PFIZER-BIONTECH) 30 MCG: CPT

## 2021-03-15 PROCEDURE — 0001A SARS-COV-2 / COVID-19 MRNA VACCINE (PFIZER-BIONTECH) 30 MCG: CPT

## 2021-04-05 ENCOUNTER — IMMUNIZATIONS (OUTPATIENT)
Dept: FAMILY MEDICINE CLINIC | Facility: HOSPITAL | Age: 51
End: 2021-04-05

## 2021-04-05 DIAGNOSIS — Z23 ENCOUNTER FOR IMMUNIZATION: Primary | ICD-10-CM

## 2021-04-05 PROCEDURE — 91300 SARS-COV-2 / COVID-19 MRNA VACCINE (PFIZER-BIONTECH) 30 MCG: CPT

## 2021-04-05 PROCEDURE — 0002A SARS-COV-2 / COVID-19 MRNA VACCINE (PFIZER-BIONTECH) 30 MCG: CPT

## 2021-04-15 LAB
LEFT EYE DIABETIC RETINOPATHY: NORMAL
RIGHT EYE DIABETIC RETINOPATHY: NORMAL
SEVERITY (EYE EXAM): NORMAL

## 2021-04-22 DIAGNOSIS — E11.42 TYPE 2 DIABETES MELLITUS WITH DIABETIC POLYNEUROPATHY, WITHOUT LONG-TERM CURRENT USE OF INSULIN (HCC): ICD-10-CM

## 2021-04-22 DIAGNOSIS — N52.8 OTHER MALE ERECTILE DYSFUNCTION: ICD-10-CM

## 2021-04-22 RX ORDER — GLIPIZIDE 10 MG/1
10 TABLET ORAL
Qty: 60 TABLET | Refills: 0 | Status: SHIPPED | OUTPATIENT
Start: 2021-04-22 | End: 2021-05-17

## 2021-04-23 DIAGNOSIS — E11.42 TYPE 2 DIABETES MELLITUS WITH DIABETIC POLYNEUROPATHY, WITHOUT LONG-TERM CURRENT USE OF INSULIN (HCC): ICD-10-CM

## 2021-04-23 NOTE — TELEPHONE ENCOUNTER
Pt states the Cialis works when he takes 2  Can he get a refill in when you send in his metformin rx?

## 2021-04-26 DIAGNOSIS — G63 POLYNEUROPATHY ASSOCIATED WITH UNDERLYING DISEASE (HCC): ICD-10-CM

## 2021-04-26 RX ORDER — TADALAFIL 20 MG/1
TABLET ORAL
Qty: 6 TABLET | Refills: 0 | Status: SHIPPED | OUTPATIENT
Start: 2021-04-26 | End: 2021-06-02 | Stop reason: SDUPTHER

## 2021-04-26 NOTE — TELEPHONE ENCOUNTER
Needs labs and OV  Last A1C was elevated  Ordered for Jan 221, but not completed yet  Please notify patient

## 2021-04-28 ENCOUNTER — APPOINTMENT (OUTPATIENT)
Dept: LAB | Facility: MEDICAL CENTER | Age: 51
End: 2021-04-28
Payer: COMMERCIAL

## 2021-04-28 DIAGNOSIS — M79.89 LEG SWELLING: ICD-10-CM

## 2021-04-28 DIAGNOSIS — R00.2 PALPITATIONS: ICD-10-CM

## 2021-04-28 LAB
ALBUMIN SERPL BCP-MCNC: 3.8 G/DL (ref 3.5–5)
ALP SERPL-CCNC: 86 U/L (ref 46–116)
ALT SERPL W P-5'-P-CCNC: 60 U/L (ref 12–78)
ANION GAP SERPL CALCULATED.3IONS-SCNC: 2 MMOL/L (ref 4–13)
AST SERPL W P-5'-P-CCNC: 35 U/L (ref 5–45)
BASOPHILS # BLD AUTO: 0.17 THOUSANDS/ΜL (ref 0–0.1)
BASOPHILS NFR BLD AUTO: 1 % (ref 0–1)
BILIRUB SERPL-MCNC: 0.86 MG/DL (ref 0.2–1)
BUN SERPL-MCNC: 14 MG/DL (ref 5–25)
CALCIUM SERPL-MCNC: 9.4 MG/DL (ref 8.3–10.1)
CHLORIDE SERPL-SCNC: 103 MMOL/L (ref 100–108)
CO2 SERPL-SCNC: 29 MMOL/L (ref 21–32)
CREAT SERPL-MCNC: 0.99 MG/DL (ref 0.6–1.3)
EOSINOPHIL # BLD AUTO: 0.19 THOUSAND/ΜL (ref 0–0.61)
EOSINOPHIL NFR BLD AUTO: 2 % (ref 0–6)
ERYTHROCYTE [DISTWIDTH] IN BLOOD BY AUTOMATED COUNT: 13.1 % (ref 11.6–15.1)
GFR SERPL CREATININE-BSD FRML MDRD: 87 ML/MIN/1.73SQ M
GLUCOSE P FAST SERPL-MCNC: 165 MG/DL (ref 65–99)
HCT VFR BLD AUTO: 54.6 % (ref 36.5–49.3)
HGB BLD-MCNC: 17.4 G/DL (ref 12–17)
IMM GRANULOCYTES # BLD AUTO: 0.13 THOUSAND/UL (ref 0–0.2)
IMM GRANULOCYTES NFR BLD AUTO: 1 % (ref 0–2)
LYMPHOCYTES # BLD AUTO: 3.05 THOUSANDS/ΜL (ref 0.6–4.47)
LYMPHOCYTES NFR BLD AUTO: 25 % (ref 14–44)
MCH RBC QN AUTO: 27.4 PG (ref 26.8–34.3)
MCHC RBC AUTO-ENTMCNC: 31.9 G/DL (ref 31.4–37.4)
MCV RBC AUTO: 86 FL (ref 82–98)
MONOCYTES # BLD AUTO: 0.78 THOUSAND/ΜL (ref 0.17–1.22)
MONOCYTES NFR BLD AUTO: 6 % (ref 4–12)
NEUTROPHILS # BLD AUTO: 7.87 THOUSANDS/ΜL (ref 1.85–7.62)
NEUTS SEG NFR BLD AUTO: 65 % (ref 43–75)
NRBC BLD AUTO-RTO: 0 /100 WBCS
PLATELET # BLD AUTO: 272 THOUSANDS/UL (ref 149–390)
PMV BLD AUTO: 10.8 FL (ref 8.9–12.7)
POTASSIUM SERPL-SCNC: 4.5 MMOL/L (ref 3.5–5.3)
PROT SERPL-MCNC: 7.6 G/DL (ref 6.4–8.2)
RBC # BLD AUTO: 6.34 MILLION/UL (ref 3.88–5.62)
SODIUM SERPL-SCNC: 134 MMOL/L (ref 136–145)
TSH SERPL DL<=0.05 MIU/L-ACNC: 1.73 UIU/ML (ref 0.36–3.74)
WBC # BLD AUTO: 12.19 THOUSAND/UL (ref 4.31–10.16)

## 2021-04-28 PROCEDURE — 36415 COLL VENOUS BLD VENIPUNCTURE: CPT

## 2021-04-28 PROCEDURE — 84443 ASSAY THYROID STIM HORMONE: CPT

## 2021-04-28 PROCEDURE — 85025 COMPLETE CBC W/AUTO DIFF WBC: CPT

## 2021-04-28 PROCEDURE — 80053 COMPREHEN METABOLIC PANEL: CPT

## 2021-05-04 DIAGNOSIS — G63 POLYNEUROPATHY ASSOCIATED WITH UNDERLYING DISEASE (HCC): ICD-10-CM

## 2021-05-04 RX ORDER — GABAPENTIN 800 MG/1
800 TABLET ORAL DAILY
Qty: 30 TABLET | Refills: 0 | Status: SHIPPED | OUTPATIENT
Start: 2021-05-04 | End: 2021-06-01

## 2021-05-10 ENCOUNTER — OFFICE VISIT (OUTPATIENT)
Dept: FAMILY MEDICINE CLINIC | Facility: CLINIC | Age: 51
End: 2021-05-10
Payer: COMMERCIAL

## 2021-05-10 VITALS
DIASTOLIC BLOOD PRESSURE: 90 MMHG | WEIGHT: 234 LBS | TEMPERATURE: 97.8 F | BODY MASS INDEX: 35.46 KG/M2 | RESPIRATION RATE: 16 BRPM | SYSTOLIC BLOOD PRESSURE: 130 MMHG | HEIGHT: 68 IN | HEART RATE: 96 BPM

## 2021-05-10 DIAGNOSIS — Z12.11 SCREENING FOR COLON CANCER: ICD-10-CM

## 2021-05-10 DIAGNOSIS — R03.0 ELEVATED BLOOD PRESSURE READING: ICD-10-CM

## 2021-05-10 DIAGNOSIS — E11.42 TYPE 2 DIABETES MELLITUS WITH DIABETIC POLYNEUROPATHY, WITHOUT LONG-TERM CURRENT USE OF INSULIN (HCC): Primary | ICD-10-CM

## 2021-05-10 DIAGNOSIS — N52.8 OTHER MALE ERECTILE DYSFUNCTION: ICD-10-CM

## 2021-05-10 DIAGNOSIS — G63 POLYNEUROPATHY ASSOCIATED WITH UNDERLYING DISEASE (HCC): ICD-10-CM

## 2021-05-10 PROCEDURE — 99214 OFFICE O/P EST MOD 30 MIN: CPT | Performed by: FAMILY MEDICINE

## 2021-05-10 RX ORDER — GABAPENTIN 800 MG/1
800 TABLET ORAL DAILY
Qty: 30 TABLET | Refills: 1 | OUTPATIENT
Start: 2021-05-10 | End: 2021-11-06

## 2021-05-10 NOTE — ASSESSMENT & PLAN NOTE
Blood pressure today on repeat was better  Recommend limit sodium, continue with hydration  Try to avoid/limit caffeine

## 2021-05-10 NOTE — ASSESSMENT & PLAN NOTE
Lab Results   Component Value Date    HGBA1C 9 8 (A) 12/10/2020   Patient did have elevated A1c previously  Because the pharmacy/patient assistance program has not delivered him the Januvia, I would not expect a significant change in his blood sugar  Will wait until after he has the Januvia, therefore will check with next blood work in approximately 3 months

## 2021-05-10 NOTE — ASSESSMENT & PLAN NOTE
Patient continues to have some issues with neuropathy  I would not make any adjustments at the moment

## 2021-05-10 NOTE — PROGRESS NOTES
Assessment and Plan:    Problem List Items Addressed This Visit     ED (erectile dysfunction)     Patient's ED seems to be much better using Cialis at 20 mg  Continue same  Elevated blood pressure reading     Blood pressure today on repeat was better  Recommend limit sodium, continue with hydration  Try to avoid/limit caffeine  Relevant Orders    Comprehensive metabolic panel    Peripheral neuropathy     Patient continues to have some issues with neuropathy  I would not make any adjustments at the moment  Type 2 diabetes mellitus ([facility] ) - Primary       Lab Results   Component Value Date    HGBA1C 9 8 (A) 12/10/2020   Patient did have elevated A1c previously  Because the pharmacy/patient assistance program has not delivered him the Januvia, I would not expect a significant change in his blood sugar  Will wait until after he has the Januvia, therefore will check with next blood work in approximately 3 months  Relevant Orders    Comprehensive metabolic panel    Hemoglobin A1C    Microalbumin / creatinine urine ratio      Other Visit Diagnoses     Screening for colon cancer        Relevant Orders    Ambulatory referral for colonoscopy                 Diagnoses and all orders for this visit:    Type 2 diabetes mellitus with diabetic polyneuropathy, without long-term current use of insulin ([facility] [address] [facility] )  -     Comprehensive metabolic panel; Future  -     Hemoglobin A1C; Future  -     Microalbumin / creatinine urine ratio; Future    Elevated blood pressure reading  -     Comprehensive metabolic panel; Future    Other male erectile dysfunction    Polyneuropathy associated with underlying disease ([address] )    Screening for colon cancer  -     Ambulatory referral for colonoscopy; Future              Subjective:      Patient ID: Amelia Sosa  is a 44 y o  male  CC:    Chief Complaint   Patient presents with    Follow-up     Patient present today for a follow up on lab results  HPI:    He is here to follow up with us about DM  He has not started Januvia  He did not get the shipment from the Pharmacy ([facility])  Did not have the A1C done with this set of labs  As far as his diabetes, the patient is currently on Glucotrol, metformin, Jardiance, and awaiting Januvia due to above-mentioned patient assistance program not providing medication yet  He does have gabapentin for the neuropathy as well  Erectile dysfunction:  Patient did notice improvement with Cialis at 20 mg, therefore in late April, prescription for 20 mg tablets was sent to the pharmacy, as opposed to taking 10 mg tablets, 2 tabs  Blood pressure today was reviewed  He is not currently on medications for this  Patient has had peripheral neuropathy for a while, related to diabetes  No real changes at the moment per patient  The following portions of the patient's history were reviewed and updated as appropriate: allergies, current medications, past family history, past medical history, past social history, past surgical history and problem list       Review of Systems   Constitutional: Negative  HENT: Negative  Eyes: Negative  Respiratory: Negative  Cardiovascular: Negative  Gastrointestinal: Negative  Endocrine: Negative  Genitourinary: Negative  Musculoskeletal: Negative  Skin: Negative  Allergic/Immunologic: Negative  Neurological: Positive for numbness (feet)  Hematological: Negative  Psychiatric/Behavioral: Negative  Data to review:   Sodium 134, potassium 4 5, calcium 9 4  Blood sugar 165  Creatinine 0 99, GFR:  87  AST 35, ALT 60  White count 12 19, hemoglobin 17 4, hematocrit 54 6, platelets 410  TSH 1 73      Objective:    Vitals:    05/10/21 1127 05/10/21 1156   BP: 148/94 130/90   BP Location: Left arm    Patient Position: Sitting    Cuff Size: Large    Pulse: 96    Resp: 16    Temp: 97 8 °F (36 6 °C)    TempSrc: Tympanic    Weight: 106 kg (234 lb)    Height: 5' 8" (1 727 m)         Physical Exam  Vitals signs and nursing note reviewed  Constitutional:       Appearance: Normal appearance  Neck:      Vascular: No carotid bruit  Cardiovascular:      Rate and Rhythm: Normal rate and regular rhythm  Pulses: Normal pulses  no weak pulses          Carotid pulses are 2+ on the right side and 2+ on the left side  Dorsalis pedis pulses are 2+ on the right side and 2+ on the left side  Posterior tibial pulses are 2+ on the right side and 2+ on the left side  Heart sounds: Normal heart sounds  No murmur  No gallop  Pulmonary:      Effort: Pulmonary effort is normal  No respiratory distress  Breath sounds: Normal breath sounds  No stridor  No wheezing, rhonchi or rales  Chest:      Chest wall: No tenderness  Feet:      Right foot:      Skin integrity: No ulcer, skin breakdown, erythema, warmth, callus or dry skin  Left foot:      Skin integrity: No ulcer, skin breakdown, erythema, warmth, callus or dry skin  Neurological:      Mental Status: He is alert  Patient's shoes and socks removed  Right Foot/Ankle   Right Foot Inspection  Skin Exam: skin normal and skin intact no dry skin, no warmth, no callus, no erythema, no maceration, no abnormal color, no pre-ulcer, no ulcer and no callus                          Toe Exam: ROM and strength within normal limits  Sensory   Vibration: diminished  Proprioception: diminished   Monofilament testing: diminished  Vascular  Capillary refills: < 3 seconds  The right DP pulse is 2+  The right PT pulse is 2+       Left Foot/Ankle  Left Foot Inspection  Skin Exam: skin normal and skin intactno dry skin, no warmth, no erythema, no maceration, normal color, no pre-ulcer, no ulcer and no callus                         Toe Exam: ROM and strength within normal limits                   Sensory   Vibration: diminished  Proprioception: diminished  Monofilament: diminished  Vascular  Capillary refills: < 3 seconds  The left DP pulse is 2+  The left PT pulse is 2+  Assign Risk Category:  No deformity present; Loss of protective sensation; No weak pulses       Risk: 1    BMI Counseling: Body mass index is 35 58 kg/m²  The BMI is above normal  Nutrition recommendations include decreasing portion sizes, encouraging healthy choices of fruits and vegetables, decreasing fast food intake, consuming healthier snacks, limiting drinks that contain sugar, moderation in carbohydrate intake, increasing intake of lean protein, reducing intake of saturated and trans fat and reducing intake of cholesterol  Exercise recommendations include exercising 3-5 times per week  No pharmacotherapy was ordered

## 2021-05-10 NOTE — PATIENT INSTRUCTIONS
Problem List Items Addressed This Visit     ED (erectile dysfunction)     Patient's ED seems to be much better using Cialis at 20 mg  Continue same  Elevated blood pressure reading     Blood pressure today on repeat was better  Recommend limit sodium, continue with hydration  Try to avoid/limit caffeine  Relevant Orders    Comprehensive metabolic panel    Peripheral neuropathy     Patient continues to have some issues with neuropathy  I would not make any adjustments at the moment  Type 2 diabetes mellitus ([facility] [address] ) - Primary       Lab Results   Component Value Date    HGBA1C 9 8 (A) 12/10/2020   Patient did have elevated A1c previously  Because the pharmacy/patient assistance program has not delivered him the Januvia, I would not expect a significant change in his blood sugar  Will wait until after he has the Januvia, therefore will check with next blood work in approximately 3 months  Relevant Orders    Comprehensive metabolic panel    Hemoglobin A1C    Microalbumin / creatinine urine ratio      Other Visit Diagnoses     Screening for colon cancer        Relevant Orders    Ambulatory referral for colonoscopy          COVID 19 Instructions    Jose Luis Nails  was advised to limit contact with others to essential tasks such as getting food, medications, and medical care  Proper handwashing reviewed, and Hand sanitzer when washing is not available  If the patient develops symptoms of COVID 19, the patient should call the office as soon as possible  For [id number] Flu season, it is strongly recommended that Flu Vaccinations be obtained  Virtual Visits may be conducted in several ways: [place]: You should get a text message when the provider is ready to see you  Click on the link in the text message, and the call should start  You will need to type in your name, and allow camera and microphone access  This is HIPPA compliant, and secure      Please try to download Google Duo  Once you do download this on your phone, you will be prompted to add your phone number to the account  After that, he should receive a text from [employer], and use that code to verify your phone number  After that, you should be able to use Google Duo to receive and make video calls  Please download Microsoft Teams to your phone or computer  You would get an email with the meeting after scheduling with the office  You will Join the meeting, and wait there till the provider joins as well  Instructions for downloading this are available from the office  This is HIPPA compliant, and secure  We are committed to getting you vaccinated as soon as possible and will be closely following CDC and [facility]  guidelines as they are released and revised  Please refer to our COVID-19 vaccine webpage for the most up to date information on the vaccine and our distribution efforts      Sapna cabrera

## 2021-05-11 ENCOUNTER — TELEPHONE (OUTPATIENT)
Dept: ADMINISTRATIVE | Facility: OTHER | Age: 51
End: 2021-05-11

## 2021-05-11 NOTE — TELEPHONE ENCOUNTER
Upon review of the In Basket request and the patient's chart, initial outreach has been made via fax, please see Contacts section for details       Thank you  Chepe Toure MA

## 2021-05-11 NOTE — LETTER
Diabetic Eye Exam Form    Date Requested: 21  Patient: Black Sears  Patient : 1970   Referring Provider: Luis Kelly MD    Dilated Retinal Exam, Optomap-Iris Exam, or Fundus Photography Done         Yes ([employer] one above)         No     Date of Diabetic Eye Exam ______________________________  Left Eye      Exam did show retinopathy    Exam did not show retinopathy         Mild       Moderate       None       Proliferative       Severe     Right Eye     Exam did show retinopathy    Exam did not show retinopathy         Mild       Moderate       None       Proliferative       Severe     Comments __________________________________________________________    Practice Providing Exam ______________________________________________    Exam Performed By (print name) _______________________________________      Provider Signature ___________________________________________________      These reports are needed for  compliance    Please fax this completed form and a copy of the Diabetic Eye Exam report to our office located at [place] [address] as soon as possible to [phone] attention Seble: Phone [phone]    We thank you for your assistance in treating our mutual patient

## 2021-05-11 NOTE — TELEPHONE ENCOUNTER
----- Message from Imani Correa sent at 5/10/2021 11:28 AM EDT -----  Regarding: Diabetic Eye exam  05/10/21 11:28 AM    Hello, our patient Dayanara Masters  has had Diabetic Eye Exam completed/performed  Please assist in updating the patient chart by making an External outreach to [address] [place] [address]  The date of service is about a month ago       Thank you,  Jj Buchanan MA  [facility]

## 2021-05-15 DIAGNOSIS — E11.42 TYPE 2 DIABETES MELLITUS WITH DIABETIC POLYNEUROPATHY, WITHOUT LONG-TERM CURRENT USE OF INSULIN (HCC): ICD-10-CM

## 2021-05-17 RX ORDER — GLIPIZIDE 10 MG/1
10 TABLET ORAL
Qty: 60 TABLET | Refills: 0 | Status: SHIPPED | OUTPATIENT
Start: 2021-05-17 | End: 2021-06-01

## 2021-05-17 NOTE — TELEPHONE ENCOUNTER
Upon review of the In Basket request we were able to locate, review, and update the patient chart as requested for Diabetic Eye Exam     Any additional questions or concerns should be emailed to the Practice Liaisons via [email or web address] email, please do not reply via In Basket      Thank you  Naomi Camarillo MA

## 2021-05-31 DIAGNOSIS — E11.42 TYPE 2 DIABETES MELLITUS WITH DIABETIC POLYNEUROPATHY, WITHOUT LONG-TERM CURRENT USE OF INSULIN (HCC): ICD-10-CM

## 2021-06-01 DIAGNOSIS — G63 POLYNEUROPATHY ASSOCIATED WITH UNDERLYING DISEASE (HCC): ICD-10-CM

## 2021-06-01 RX ORDER — GLIPIZIDE 10 MG/1
10 TABLET ORAL
Qty: 180 TABLET | Refills: 1 | Status: SHIPPED | OUTPATIENT
Start: 2021-06-01 | End: 2021-12-08

## 2021-06-01 RX ORDER — GABAPENTIN 800 MG/1
800 TABLET ORAL DAILY
Qty: 30 TABLET | Refills: 2 | Status: SHIPPED | OUTPATIENT
Start: 2021-06-01 | End: 2021-09-17

## 2021-06-02 DIAGNOSIS — N52.8 OTHER MALE ERECTILE DYSFUNCTION: ICD-10-CM

## 2021-06-03 RX ORDER — TADALAFIL 20 MG/1
TABLET ORAL
Qty: 6 TABLET | Refills: 0 | Status: SHIPPED | OUTPATIENT
Start: 2021-06-03 | End: 2021-08-19 | Stop reason: SDUPTHER

## 2021-07-23 ENCOUNTER — TELEPHONE (OUTPATIENT)
Dept: FAMILY MEDICINE CLINIC | Facility: CLINIC | Age: 51
End: 2021-07-23

## 2021-07-23 NOTE — TELEPHONE ENCOUNTER
RECEIVED CALL FROM ANAM RICE AT [facility] STATING THE [employer] ASSISTANCE APPLICATION FAXED OVER TO THEM WAS MISSING ADDITIONAL INFORMATION (LICENSE #) FOR DR CLAY   PROVIDED THAT INFORMATION TO HER OVER THE PHONE

## 2021-07-27 DIAGNOSIS — E11.42 TYPE 2 DIABETES MELLITUS WITH DIABETIC POLYNEUROPATHY, WITHOUT LONG-TERM CURRENT USE OF INSULIN (HCC): ICD-10-CM

## 2021-08-19 ENCOUNTER — OFFICE VISIT (OUTPATIENT)
Dept: FAMILY MEDICINE CLINIC | Facility: CLINIC | Age: 51
End: 2021-08-19
Payer: COMMERCIAL

## 2021-08-19 ENCOUNTER — APPOINTMENT (OUTPATIENT)
Dept: LAB | Facility: CLINIC | Age: 51
End: 2021-08-19
Payer: COMMERCIAL

## 2021-08-19 VITALS
DIASTOLIC BLOOD PRESSURE: 86 MMHG | TEMPERATURE: 98.2 F | HEART RATE: 84 BPM | BODY MASS INDEX: 36.07 KG/M2 | HEIGHT: 68 IN | SYSTOLIC BLOOD PRESSURE: 144 MMHG | WEIGHT: 238 LBS

## 2021-08-19 DIAGNOSIS — R03.0 ELEVATED BLOOD PRESSURE READING: ICD-10-CM

## 2021-08-19 DIAGNOSIS — E78.2 MIXED HYPERLIPIDEMIA: ICD-10-CM

## 2021-08-19 DIAGNOSIS — E11.42 TYPE 2 DIABETES MELLITUS WITH DIABETIC POLYNEUROPATHY, WITHOUT LONG-TERM CURRENT USE OF INSULIN (HCC): Primary | ICD-10-CM

## 2021-08-19 DIAGNOSIS — K21.9 GASTROESOPHAGEAL REFLUX DISEASE WITHOUT ESOPHAGITIS: ICD-10-CM

## 2021-08-19 DIAGNOSIS — E11.42 TYPE 2 DIABETES MELLITUS WITH DIABETIC POLYNEUROPATHY, WITHOUT LONG-TERM CURRENT USE OF INSULIN (HCC): ICD-10-CM

## 2021-08-19 DIAGNOSIS — N52.8 OTHER MALE ERECTILE DYSFUNCTION: ICD-10-CM

## 2021-08-19 LAB
ALBUMIN SERPL BCP-MCNC: 3.4 G/DL (ref 3.5–5)
ALP SERPL-CCNC: 76 U/L (ref 46–116)
ALT SERPL W P-5'-P-CCNC: 49 U/L (ref 12–78)
ANION GAP SERPL CALCULATED.3IONS-SCNC: 3 MMOL/L (ref 4–13)
AST SERPL W P-5'-P-CCNC: 21 U/L (ref 5–45)
BILIRUB SERPL-MCNC: 0.44 MG/DL (ref 0.2–1)
BUN SERPL-MCNC: 13 MG/DL (ref 5–25)
CALCIUM ALBUM COR SERPL-MCNC: 9.6 MG/DL (ref 8.3–10.1)
CALCIUM SERPL-MCNC: 9.1 MG/DL (ref 8.3–10.1)
CHLORIDE SERPL-SCNC: 103 MMOL/L (ref 100–108)
CO2 SERPL-SCNC: 28 MMOL/L (ref 21–32)
CREAT SERPL-MCNC: 0.72 MG/DL (ref 0.6–1.3)
CREAT UR-MCNC: 109 MG/DL
EST. AVERAGE GLUCOSE BLD GHB EST-MCNC: 174 MG/DL
GFR SERPL CREATININE-BSD FRML MDRD: 107 ML/MIN/1.73SQ M
GLUCOSE SERPL-MCNC: 163 MG/DL (ref 65–140)
HBA1C MFR BLD: 7.7 %
MICROALBUMIN UR-MCNC: 22.5 MG/L (ref 0–20)
MICROALBUMIN/CREAT 24H UR: 21 MG/G CREATININE (ref 0–30)
POTASSIUM SERPL-SCNC: 4 MMOL/L (ref 3.5–5.3)
PROT SERPL-MCNC: 7.2 G/DL (ref 6.4–8.2)
SODIUM SERPL-SCNC: 134 MMOL/L (ref 136–145)

## 2021-08-19 PROCEDURE — 82043 UR ALBUMIN QUANTITATIVE: CPT

## 2021-08-19 PROCEDURE — 80053 COMPREHEN METABOLIC PANEL: CPT

## 2021-08-19 PROCEDURE — 83036 HEMOGLOBIN GLYCOSYLATED A1C: CPT

## 2021-08-19 PROCEDURE — 82570 ASSAY OF URINE CREATININE: CPT

## 2021-08-19 PROCEDURE — 99214 OFFICE O/P EST MOD 30 MIN: CPT | Performed by: FAMILY MEDICINE

## 2021-08-19 PROCEDURE — 36415 COLL VENOUS BLD VENIPUNCTURE: CPT

## 2021-08-19 RX ORDER — TADALAFIL 20 MG/1
TABLET ORAL
Qty: 8 TABLET | Refills: 11 | Status: SHIPPED | OUTPATIENT
Start: 2021-08-19 | End: 2021-12-16 | Stop reason: SDUPTHER

## 2021-08-19 NOTE — PATIENT INSTRUCTIONS
Problem List Items Addressed This Visit     ED (erectile dysfunction)     Renewed Cialis  Patient tolerating  Relevant Medications    tadalafil (CIALIS) 20 MG tablet    Elevated blood pressure reading     Blood pressure today was slightly elevated, however again I would not start medications today  Limit sodium, increase exercise  Recheck with next blood work  GERD (gastroesophageal reflux disease)     Reflux seems to be doing quite well at this point  Not currently on medications  Will evaluate the future  Type 2 diabetes mellitus ([facility] [address] ) - Primary       Lab Results   Component Value Date    HGBA1C 9 8 (A) 12/10/2020   Last A1c was quite a bit elevated, but he does have blood work ordered, and will be going today to get that done  After that, can have phone call to see if we need to adjust any medications  He mentioned that he has seen a decrease in his blood sugar when he went on the Januvia, and continues on glipizide  Continue to limit carbohydrate intake, increase exercise, consider diabetic Education  Relevant Orders    Hemoglobin A1C    Comprehensive metabolic panel      Other Visit Diagnoses     Mixed hyperlipidemia        Relevant Orders    Comprehensive metabolic panel    Lipid panel          COVID 19 Instructions    Cedrick Hyattelizabethayesha  was advised to limit contact with others to essential tasks such as getting food, medications, and medical care  Proper handwashing reviewed, and Hand sanitzer when washing is not available  If the patient develops symptoms of COVID 19, the patient should call the office as soon as possible  For 0415-0170 Flu season, it is strongly recommended that Flu Vaccinations be obtained  Virtual Visits may be conducted in several ways: [place]: You should get a text message when the provider is ready to see you  Click on the link in the text message, and the call should start    You will need to type in your name, and allow camera and microphone access  This is HIPPA compliant, and secure  Please try to download Google Duo  Once you do download this on your phone, you will be prompted to add your phone number to the account  After that, he should receive a text from [employer], and use that code to verify your phone number  After that, you should be able to use Google Duo to receive and make video calls  Please download Microsoft Teams to your phone or computer  You would get an email with the meeting after scheduling with the office  You will Join the meeting, and wait there till the provider joins as well  Instructions for downloading this are available from the office  This is HIPPA compliant, and secure  We are committed to getting you vaccinated as soon as possible and will be closely following CDC and [facility]  guidelines as they are released and revised  Please refer to our COVID-19 vaccine webpage for the most up to date information on the vaccine and our distribution efforts  [employer] tn    OUR NEW LOCATION:  Starting around 28June2021, our new location and phone are:    [address], [address] [place], [place], [address]  Fax: [phone]    Lab services and OB/GYN will be at this location as well

## 2021-08-19 NOTE — PROGRESS NOTES
Assessment and Plan:    Problem List Items Addressed This Visit     ED (erectile dysfunction)     Renewed Cialis  Patient tolerating  Relevant Medications    tadalafil (CIALIS) 20 MG tablet    Elevated blood pressure reading     Blood pressure today was slightly elevated, however again I would not start medications today  Limit sodium, increase exercise  Recheck with next blood work  GERD (gastroesophageal reflux disease)     Reflux seems to be doing quite well at this point  Not currently on medications  Will evaluate the future  Type 2 diabetes mellitus ([facility] [address] ) - Primary       Lab Results   Component Value Date    HGBA1C 9 8 (A) 12/10/2020   Last A1c was quite a bit elevated, but he does have blood work ordered, and will be going today to get that done  After that, can have phone call to see if we need to adjust any medications  He mentioned that he has seen a decrease in his blood sugar when he went on the Januvia, and continues on glipizide  Continue to limit carbohydrate intake, increase exercise, consider diabetic Education  Relevant Orders    Hemoglobin A1C    Comprehensive metabolic panel      Other Visit Diagnoses     Mixed hyperlipidemia        Relevant Orders    Comprehensive metabolic panel    Lipid panel                 Diagnoses and all orders for this visit:    Type 2 diabetes mellitus with diabetic polyneuropathy, without long-term current use of insulin ([facility] )  -     Hemoglobin A1C; Future  -     Comprehensive metabolic panel; Future    Elevated blood pressure reading    Gastroesophageal reflux disease without esophagitis    Mixed hyperlipidemia  -     Comprehensive metabolic panel; Future  -     Lipid panel; Future    Other male erectile dysfunction  -     tadalafil (CIALIS) 20 MG tablet; Take 1 PO as needed about 1 hour before sex  Subjective:      Patient ID: Uriel Christina  is a 44 y o  male      CC:    Chief Complaint   Patient presents with  Diabetes     Evaluate medications  Pt has not had any recent blood work  He states there are no other concerns at this time  -  [facility]       HPI:    Patient is here for multiple issues  Unfortunately, he has not gotten blood work it  As far as diabetes, the patient has now [address] for 3 months  Were some issues initially with the patient assistance program, but that is now been sorted out  He has noted that his sugars have been better, more likely below 200  He does continue to have some neuropathy pains at times  ED:  Cialis seems to be helping  Hypertension:  Currently blood pressure has been doing relatively well  Not on medications for that  The following portions of the patient's history were reviewed and updated as appropriate: allergies, current medications, past family history, past medical history, past social history, past surgical history and problem list       Review of Systems   Constitutional: Negative  HENT: Negative  Eyes: Negative  Respiratory: Negative  Cardiovascular: Negative  Gastrointestinal: Negative  Endocrine: Negative  Genitourinary: Negative  Musculoskeletal: Negative  Skin: Negative  Allergic/Immunologic: Negative  Neurological: Negative  Hematological: Negative  Psychiatric/Behavioral: Negative  Data to review:       Objective:    Vitals:    08/19/21 1301   BP: 144/86   BP Location: Left arm   Patient Position: Sitting   Cuff Size: Large   Pulse: 84   Temp: 98 2 °F (36 8 °C)   TempSrc: Oral   Weight: 108 kg (238 lb)   Height: 5' 8" (1 727 m)        Physical Exam  Vitals and nursing note reviewed  Constitutional:       Appearance: Normal appearance  Neck:      Vascular: No carotid bruit  Cardiovascular:      Rate and Rhythm: Normal rate and regular rhythm  Pulses: Normal pulses  Carotid pulses are 2+ on the right side and 2+ on the left side       Heart sounds: Normal heart sounds  No murmur heard  No gallop  Pulmonary:      Effort: Pulmonary effort is normal  No respiratory distress  Breath sounds: Normal breath sounds  No stridor  No wheezing, rhonchi or rales  Chest:      Chest wall: No tenderness  Neurological:      Mental Status: He is alert

## 2021-09-16 DIAGNOSIS — G63 POLYNEUROPATHY ASSOCIATED WITH UNDERLYING DISEASE (HCC): ICD-10-CM

## 2021-09-17 RX ORDER — GABAPENTIN 800 MG/1
800 TABLET ORAL DAILY
Qty: 30 TABLET | Refills: 2 | Status: SHIPPED | OUTPATIENT
Start: 2021-09-17 | End: 2021-12-16 | Stop reason: SDUPTHER

## 2021-12-08 DIAGNOSIS — E11.42 TYPE 2 DIABETES MELLITUS WITH DIABETIC POLYNEUROPATHY, WITHOUT LONG-TERM CURRENT USE OF INSULIN (HCC): ICD-10-CM

## 2021-12-08 RX ORDER — GLIPIZIDE 10 MG/1
10 TABLET ORAL
Qty: 180 TABLET | Refills: 1 | Status: SHIPPED | OUTPATIENT
Start: 2021-12-08 | End: 2022-06-23

## 2021-12-16 DIAGNOSIS — E11.42 TYPE 2 DIABETES MELLITUS WITH DIABETIC POLYNEUROPATHY, WITHOUT LONG-TERM CURRENT USE OF INSULIN (HCC): ICD-10-CM

## 2021-12-22 ENCOUNTER — APPOINTMENT (OUTPATIENT)
Dept: LAB | Facility: CLINIC | Age: 51
End: 2021-12-22
Payer: COMMERCIAL

## 2021-12-22 DIAGNOSIS — E78.2 MIXED HYPERLIPIDEMIA: ICD-10-CM

## 2021-12-22 DIAGNOSIS — E11.42 TYPE 2 DIABETES MELLITUS WITH DIABETIC POLYNEUROPATHY, WITHOUT LONG-TERM CURRENT USE OF INSULIN (HCC): ICD-10-CM

## 2021-12-22 LAB
ALBUMIN SERPL BCP-MCNC: 3.9 G/DL (ref 3.5–5)
ALP SERPL-CCNC: 84 U/L (ref 46–116)
ALT SERPL W P-5'-P-CCNC: 50 U/L (ref 12–78)
ANION GAP SERPL CALCULATED.3IONS-SCNC: 5 MMOL/L (ref 4–13)
AST SERPL W P-5'-P-CCNC: 17 U/L (ref 5–45)
BILIRUB SERPL-MCNC: 0.66 MG/DL (ref 0.2–1)
BUN SERPL-MCNC: 13 MG/DL (ref 5–25)
CALCIUM SERPL-MCNC: 10.4 MG/DL (ref 8.3–10.1)
CHLORIDE SERPL-SCNC: 103 MMOL/L (ref 100–108)
CHOLEST SERPL-MCNC: 178 MG/DL
CO2 SERPL-SCNC: 30 MMOL/L (ref 21–32)
CREAT SERPL-MCNC: 0.9 MG/DL (ref 0.6–1.3)
EST. AVERAGE GLUCOSE BLD GHB EST-MCNC: 177 MG/DL
GFR SERPL CREATININE-BSD FRML MDRD: 97 ML/MIN/1.73SQ M
GLUCOSE P FAST SERPL-MCNC: 171 MG/DL (ref 65–99)
HBA1C MFR BLD: 7.8 %
HDLC SERPL-MCNC: 34 MG/DL
LDLC SERPL CALC-MCNC: 106 MG/DL (ref 0–100)
NONHDLC SERPL-MCNC: 144 MG/DL
POTASSIUM SERPL-SCNC: 3.8 MMOL/L (ref 3.5–5.3)
PROT SERPL-MCNC: 7.5 G/DL (ref 6.4–8.2)
SODIUM SERPL-SCNC: 138 MMOL/L (ref 136–145)
TRIGL SERPL-MCNC: 188 MG/DL

## 2021-12-22 PROCEDURE — 80061 LIPID PANEL: CPT

## 2021-12-22 PROCEDURE — 36415 COLL VENOUS BLD VENIPUNCTURE: CPT

## 2021-12-22 PROCEDURE — 80053 COMPREHEN METABOLIC PANEL: CPT

## 2021-12-22 PROCEDURE — 83036 HEMOGLOBIN GLYCOSYLATED A1C: CPT

## 2021-12-29 ENCOUNTER — OFFICE VISIT (OUTPATIENT)
Dept: FAMILY MEDICINE CLINIC | Facility: CLINIC | Age: 51
End: 2021-12-29
Payer: COMMERCIAL

## 2021-12-29 VITALS
WEIGHT: 232.4 LBS | BODY MASS INDEX: 35.22 KG/M2 | SYSTOLIC BLOOD PRESSURE: 154 MMHG | HEART RATE: 100 BPM | HEIGHT: 68 IN | DIASTOLIC BLOOD PRESSURE: 92 MMHG

## 2021-12-29 DIAGNOSIS — N52.8 OTHER MALE ERECTILE DYSFUNCTION: ICD-10-CM

## 2021-12-29 DIAGNOSIS — G63 POLYNEUROPATHY ASSOCIATED WITH UNDERLYING DISEASE (HCC): ICD-10-CM

## 2021-12-29 DIAGNOSIS — E11.42 TYPE 2 DIABETES MELLITUS WITH DIABETIC POLYNEUROPATHY, WITHOUT LONG-TERM CURRENT USE OF INSULIN (HCC): ICD-10-CM

## 2021-12-29 DIAGNOSIS — R03.0 ELEVATED BLOOD PRESSURE READING: ICD-10-CM

## 2021-12-29 DIAGNOSIS — Z23 INFLUENZA VACCINATION GIVEN: Primary | ICD-10-CM

## 2021-12-29 DIAGNOSIS — E78.2 MIXED HYPERLIPIDEMIA: ICD-10-CM

## 2021-12-29 PROCEDURE — 90471 IMMUNIZATION ADMIN: CPT | Performed by: FAMILY MEDICINE

## 2021-12-29 PROCEDURE — 90682 RIV4 VACC RECOMBINANT DNA IM: CPT | Performed by: FAMILY MEDICINE

## 2021-12-29 PROCEDURE — 99214 OFFICE O/P EST MOD 30 MIN: CPT | Performed by: FAMILY MEDICINE

## 2021-12-29 RX ORDER — TADALAFIL 20 MG/1
TABLET ORAL
Qty: 8 TABLET | Refills: 5 | Status: SHIPPED | OUTPATIENT
Start: 2021-12-29

## 2022-01-12 DIAGNOSIS — G63 POLYNEUROPATHY ASSOCIATED WITH UNDERLYING DISEASE (HCC): ICD-10-CM

## 2022-01-12 DIAGNOSIS — E11.42 TYPE 2 DIABETES MELLITUS WITH DIABETIC POLYNEUROPATHY, WITHOUT LONG-TERM CURRENT USE OF INSULIN (HCC): ICD-10-CM

## 2022-01-12 RX ORDER — GABAPENTIN 800 MG/1
800 TABLET ORAL DAILY
Qty: 30 TABLET | Refills: 1 | Status: SHIPPED | OUTPATIENT
Start: 2022-01-12 | End: 2022-03-24

## 2022-04-12 DIAGNOSIS — E11.42 TYPE 2 DIABETES MELLITUS WITH DIABETIC POLYNEUROPATHY, WITHOUT LONG-TERM CURRENT USE OF INSULIN (HCC): ICD-10-CM

## 2022-04-12 RX ORDER — SITAGLIPTIN 100 MG/1
TABLET, FILM COATED ORAL
Qty: 90 TABLET | Refills: 2 | Status: SHIPPED | OUTPATIENT
Start: 2022-04-12 | End: 2022-05-11 | Stop reason: ALTCHOICE

## 2022-04-12 NOTE — TELEPHONE ENCOUNTER
Requested Prescriptions     Pending Prescriptions Disp Refills    Januvia 100 MG tablet [Pharmacy Med Name: JANUVIA 100MG TAB] 90 tablet 2     Sig: TAKE ONE TABLET BY MOUTH DAILY     LOV 12/29/21, F/U 5/11/22, labs pending

## 2022-05-07 ENCOUNTER — APPOINTMENT (OUTPATIENT)
Dept: LAB | Facility: MEDICAL CENTER | Age: 52
End: 2022-05-07
Payer: COMMERCIAL

## 2022-05-07 DIAGNOSIS — N52.8 OTHER MALE ERECTILE DYSFUNCTION: ICD-10-CM

## 2022-05-07 DIAGNOSIS — E11.42 TYPE 2 DIABETES MELLITUS WITH DIABETIC POLYNEUROPATHY, WITHOUT LONG-TERM CURRENT USE OF INSULIN (HCC): ICD-10-CM

## 2022-05-07 LAB
ALBUMIN SERPL BCP-MCNC: 3.7 G/DL (ref 3.5–5)
ALP SERPL-CCNC: 86 U/L (ref 46–116)
ALT SERPL W P-5'-P-CCNC: 46 U/L (ref 12–78)
ANION GAP SERPL CALCULATED.3IONS-SCNC: 6 MMOL/L (ref 4–13)
AST SERPL W P-5'-P-CCNC: 22 U/L (ref 5–45)
BILIRUB SERPL-MCNC: 0.77 MG/DL (ref 0.2–1)
BUN SERPL-MCNC: 13 MG/DL (ref 5–25)
CALCIUM SERPL-MCNC: 9.9 MG/DL (ref 8.3–10.1)
CHLORIDE SERPL-SCNC: 100 MMOL/L (ref 100–108)
CO2 SERPL-SCNC: 30 MMOL/L (ref 21–32)
CREAT SERPL-MCNC: 0.94 MG/DL (ref 0.6–1.3)
CREAT UR-MCNC: 85.5 MG/DL
EST. AVERAGE GLUCOSE BLD GHB EST-MCNC: 223 MG/DL
GFR SERPL CREATININE-BSD FRML MDRD: 92 ML/MIN/1.73SQ M
GLUCOSE P FAST SERPL-MCNC: 155 MG/DL (ref 65–99)
HBA1C MFR BLD: 9.4 %
MICROALBUMIN UR-MCNC: 8.2 MG/L (ref 0–20)
MICROALBUMIN/CREAT 24H UR: 10 MG/G CREATININE (ref 0–30)
POTASSIUM SERPL-SCNC: 4.8 MMOL/L (ref 3.5–5.3)
PROT SERPL-MCNC: 7.4 G/DL (ref 6.4–8.2)
SODIUM SERPL-SCNC: 136 MMOL/L (ref 136–145)

## 2022-05-07 PROCEDURE — 84403 ASSAY OF TOTAL TESTOSTERONE: CPT

## 2022-05-07 PROCEDURE — 36415 COLL VENOUS BLD VENIPUNCTURE: CPT

## 2022-05-07 PROCEDURE — 84402 ASSAY OF FREE TESTOSTERONE: CPT

## 2022-05-07 PROCEDURE — 82043 UR ALBUMIN QUANTITATIVE: CPT

## 2022-05-07 PROCEDURE — 80053 COMPREHEN METABOLIC PANEL: CPT

## 2022-05-07 PROCEDURE — 82570 ASSAY OF URINE CREATININE: CPT

## 2022-05-07 PROCEDURE — 83036 HEMOGLOBIN GLYCOSYLATED A1C: CPT

## 2022-05-09 LAB
TESTOST FREE SERPL-MCNC: 6.8 PG/ML (ref 7.2–24)
TESTOST SERPL-MCNC: 159 NG/DL (ref 264–916)

## 2022-05-11 ENCOUNTER — OFFICE VISIT (OUTPATIENT)
Dept: FAMILY MEDICINE CLINIC | Facility: CLINIC | Age: 52
End: 2022-05-11
Payer: COMMERCIAL

## 2022-05-11 VITALS
SYSTOLIC BLOOD PRESSURE: 116 MMHG | BODY MASS INDEX: 34.68 KG/M2 | HEART RATE: 100 BPM | WEIGHT: 228.8 LBS | HEIGHT: 68 IN | DIASTOLIC BLOOD PRESSURE: 80 MMHG | OXYGEN SATURATION: 98 %

## 2022-05-11 DIAGNOSIS — R03.0 ELEVATED BLOOD PRESSURE READING: ICD-10-CM

## 2022-05-11 DIAGNOSIS — G63 POLYNEUROPATHY ASSOCIATED WITH UNDERLYING DISEASE (HCC): ICD-10-CM

## 2022-05-11 DIAGNOSIS — R79.89 LOW TESTOSTERONE IN MALE: ICD-10-CM

## 2022-05-11 DIAGNOSIS — K21.9 GASTROESOPHAGEAL REFLUX DISEASE WITHOUT ESOPHAGITIS: ICD-10-CM

## 2022-05-11 DIAGNOSIS — E11.42 TYPE 2 DIABETES MELLITUS WITH DIABETIC POLYNEUROPATHY, WITHOUT LONG-TERM CURRENT USE OF INSULIN (HCC): ICD-10-CM

## 2022-05-11 DIAGNOSIS — N52.8 OTHER MALE ERECTILE DYSFUNCTION: ICD-10-CM

## 2022-05-11 DIAGNOSIS — Z12.11 COLON CANCER SCREENING: Primary | ICD-10-CM

## 2022-05-11 PROCEDURE — 99214 OFFICE O/P EST MOD 30 MIN: CPT | Performed by: FAMILY MEDICINE

## 2022-05-11 RX ORDER — TESTOSTERONE 12.5 MG/1.25G
2 GEL TOPICAL DAILY
Qty: 75 G | Refills: 0 | Status: SHIPPED | OUTPATIENT
Start: 2022-05-11

## 2022-05-11 RX ORDER — GABAPENTIN 600 MG/1
1200 TABLET ORAL
Qty: 60 TABLET | Refills: 5 | Status: SHIPPED | OUTPATIENT
Start: 2022-05-11

## 2022-05-11 NOTE — ASSESSMENT & PLAN NOTE
Lab Results   Component Value Date    HGBA1C 9 4 (H) 05/07/2022   Patient's A1c is definitely worse than what it was  Reviewed about diabetic Education  He has not been to that previously  I would recommend that he consider going  Reviewed diet  Given the significant change, will change to Rybelsus, 3 mg daily  This would replace the Januvia

## 2022-05-11 NOTE — ASSESSMENT & PLAN NOTE
Neuropathy is a bit worse lately  Will increase the gabapentin to 1200 at HS  Beyond this, we have to start adding some at other times during the day  Consider follow-up with endocrinology at some point  Consider follow-up with diabetic Education as well

## 2022-05-11 NOTE — PATIENT INSTRUCTIONS
10% - bad control"> 10% - bad control,Hemoglobin A1c (HbA1c) greater than 10% indicating poor diabetic control,Haemoglobin A1c greater than 10% indicating poor diabetic control">   Diabetes Mellitus Type 2 in Adults, Ambulatory Care   GENERAL INFORMATION:   Diabetes mellitus type 2  is a disease that affects how your body uses glucose (sugar)  Insulin helps move sugar out of the blood so it can be used for energy  Normally, when the blood sugar level increases, the pancreas makes more insulin  Type 2 diabetes develops because either the body cannot make enough insulin, or it cannot use the insulin correctly  After many years, your pancreas may stop making insulin  Common symptoms include the following:   More hunger or thirst than usual     Frequent urination     Weight loss without trying     Blurred vision  Seek immediate care for the following symptoms:   Severe abdominal pain, or pain that spreads to your back  You may also be vomiting  Trouble staying awake or focusing    Shaking or sweating    Blurred or double vision    Breath has a fruity, sweet smell    Breathing is deep and labored, or rapid and shallow    Heartbeat is fast and weak  Treatment for diabetes mellitus type 2  includes keeping your blood sugar at a normal level  You must eat the right foods, and exercise regularly  You may also need medicine if you cannot control your blood sugar level with nutrition and exercise  Manage diabetes mellitus type 2:   Check your blood sugar level  You will be taught how to check a small drop of blood in a glucose monitor  Ask your healthcare provider when and how often to check during the day  Ask your healthcare provider what your blood sugar levels should be when you check them  Keep track of carbohydrates (sugar and starchy foods)  Your blood sugar level can get too high if you eat too many carbohydrates   Your dietitian will help you plan meals and snacks that have the right amount of carbohydrates  Eat low-fat foods  Some examples are skinless chicken and low-fat milk  Eat less sodium (salt)  Some examples of high-sodium foods to limit are soy sauce, potato chips, and soup  Do not add salt to food you cook  Limit your use of table salt  Eat high-fiber foods  Foods that are a good source of fiber include vegetables, whole grain bread, and beans  Limit alcohol  Alcohol affects your blood sugar level and can make it harder to manage your diabetes  Women should limit alcohol to 1 drink a day  Men should limit alcohol to 2 drinks a day  A drink of alcohol is 12 ounces of beer, 5 ounces of wine, or 1½ ounces of liquor  Get regular exercise  Exercise can help keep your blood sugar level steady, decrease your risk of heart disease, and help you lose weight  Exercise for at least 30 minutes, 5 days a week  Include muscle strengthening activities 2 days each week  Work with your healthcare provider to create an exercise plan  Check your feet each day  for injuries or open sores  Ask your healthcare provider for activities you can do if you have an open sore  Quit smoking  If you smoke, it is never too late to quit  Smoking can worsen the problems that may occur with diabetes  Ask your healthcare provider for information about how to stop smoking if you are having trouble quitting  Ask about your weight:  Ask healthcare providers if you need to lose weight, and how much to lose  Ask them to help you with a weight loss program  Even a 10 to 15 pound weight loss can help you manage your blood sugar level  Carry medical alert identification  Wear medical alert jewelry or carry a card that says you have diabetes  Ask your healthcare provider where to get these items  Ask about vaccines  Diabetes puts you at risk of serious illness if you get the flu, pneumonia, or hepatitis   Ask your healthcare provider if you should get a flu, pneumonia, or hepatitis B vaccine, and when to get the vaccine  Follow up with your healthcare provider as directed:  Write down your questions so you remember to ask them during your visits  CARE AGREEMENT:   You have the right to help plan your care  Learn about your health condition and how it may be treated  Discuss treatment options with your caregivers to decide what care you want to receive  You always have the right to refuse treatment  The above information is an  only  It is not intended as medical advice for individual conditions or treatments  Talk to your doctor, nurse or pharmacist before following any medical regimen to see if it is safe and effective for you  © 2014 [address] is for End User's use only and may not be sold, redistributed or otherwise used for commercial purposes  All illustrations and images included in CareNotes® are the copyrighted property of A D A M [employer] Inc  or Rodolfo Joanne  Problem List Items Addressed This Visit       ED (erectile dysfunction)     Patient does have some ED issues  Clearly, with the low testosterone that is responsible for part of it  Recommend follow-up with labs, and consider testosterone supplementation after reviewing for prostate cancer issues, i e  PSA  Relevant Medications    Testosterone 12 5 MG/ACT (1%) GEL    Other Relevant Orders    Testosterone, free, total    PSA Total, Diagnostic    Elevated blood pressure reading     Blood pressure doing well today  Not on medications  Limit sodium  Continue on Jardiance  Relevant Orders    Comprehensive metabolic panel    GERD (gastroesophageal reflux disease)     Stable at the moment  No change  Low testosterone in male     Testosterone level is low, corresponding to some of the problems that he has been having  Reviewed about testosterone supplementation, and needing to make sure the prostate is doing relatively well 1st as well    Will check PSA, and also start on testosterone  Will need to check level in approximately 1 month  Patient does understand the testosterone is considered a controlled substance  Patient also understands that it should not be used anywhere near a female  Relevant Medications    Testosterone 12 5 MG/ACT (1%) GEL    Other Relevant Orders    Testosterone, free, total    PSA Total, Diagnostic    Peripheral neuropathy     Neuropathy is a bit worse lately  Will increase the gabapentin to 1200 at HS  Beyond this, we have to start adding some at other times during the day  Consider follow-up with endocrinology at some point  Consider follow-up with diabetic Education as well  Relevant Medications    gabapentin (NEURONTIN) 600 MG tablet    Type 2 diabetes mellitus ([facility] [address] )       Lab Results   Component Value Date    HGBA1C 9 4 (H) 05/07/2022   Patient's A1c is definitely worse than what it was  Reviewed about diabetic Education  He has not been to that previously  I would recommend that he consider going  Reviewed diet  Given the significant change, will change to Rybelsus, 3 mg daily  This would replace the Januvia  Relevant Medications    Semaglutide 3 MG TABS    Other Relevant Orders    Ambulatory referral to Diabetic Education    Hemoglobin A1C    Comprehensive metabolic panel          Other Visit Diagnoses       Colon cancer screening    -  Primary    Relevant Orders    Cologuard            COVID 19 Instructions    Nicole Linn  was advised to limit contact with others to essential tasks such as getting food, medications, and medical care  Proper handwashing reviewed, and Hand sanitzer when washing is not available  If the patient develops symptoms of COVID 19, the patient should call the office as soon as possible  For 6911-9402 Flu season, it is strongly recommended that Flu Vaccinations be obtained        Virtual Visits:  [place]: This works on smart phones (any phone with Internet browsing capability)  You should get a text message when the provider is ready to see you  Click on the link in the text message, and the call should start  You will need to type in your name, and allow camera and microphone access  This is HIPPA compliant, and secure  If you have not already done so, get immunized to COVID 19  We are committed to getting you vaccinated as soon as possible and will be closely following CDC and [facility]  guidelines as they are released and revised  Please refer to our COVID-19 vaccine webpage for the most up to date information on the vaccine and our distribution efforts  This site will also have the most up to date recommendations for COVID booster vaccine  [employer] tn    Call [phone] ([phone]), option 7    OUR NEW LOCATION:    [address], [facility] [place], [place], [address]  Fax: [phone]    Lab services and OB/GYN are at this location as well

## 2022-05-11 NOTE — ASSESSMENT & PLAN NOTE
Patient does have some ED issues  Clearly, with the low testosterone that is responsible for part of it  Recommend follow-up with labs, and consider testosterone supplementation after reviewing for prostate cancer issues, i e  PSA

## 2022-05-11 NOTE — PROGRESS NOTES
Assessment and Plan:    Problem List Items Addressed This Visit     ED (erectile dysfunction)     Patient does have some ED issues  Clearly, with the low testosterone that is responsible for part of it  Recommend follow-up with labs, and consider testosterone supplementation after reviewing for prostate cancer issues, i e  PSA  Relevant Medications    Testosterone 12 5 MG/ACT (1%) GEL    Other Relevant Orders    Testosterone, free, total    PSA Total, Diagnostic    Elevated blood pressure reading     Blood pressure doing well today  Not on medications  Limit sodium  Continue on Jardiance  Relevant Orders    Comprehensive metabolic panel    GERD (gastroesophageal reflux disease)     Stable at the moment  No change  Low testosterone in male     Testosterone level is low, corresponding to some of the problems that he has been having  Reviewed about testosterone supplementation, and needing to make sure the prostate is doing relatively well 1st as well  Will check PSA, and also start on testosterone  Will need to check level in approximately 1 month  Patient does understand the testosterone is considered a controlled substance  Patient also understands that it should not be used anywhere near a female  Relevant Medications    Testosterone 12 5 MG/ACT (1%) GEL    Other Relevant Orders    Testosterone, free, total    PSA Total, Diagnostic    Peripheral neuropathy     Neuropathy is a bit worse lately  Will increase the gabapentin to 1200 at HS  Beyond this, we have to start adding some at other times during the day  Consider follow-up with endocrinology at some point  Consider follow-up with diabetic Education as well  Relevant Medications    gabapentin (NEURONTIN) 600 MG tablet    Type 2 diabetes mellitus ([facility] [address] )       Lab Results   Component Value Date    HGBA1C 9 4 (H) 05/07/2022   Patient's A1c is definitely worse than what it was    Reviewed about diabetic Education  He has not been to that previously  I would recommend that he consider going  Reviewed diet  Given the significant change, will change to Rybelsus, 3 mg daily  This would replace the Januvia  Relevant Medications    Semaglutide 3 MG TABS    Other Relevant Orders    Ambulatory referral to Diabetic Education    Hemoglobin A1C    Comprehensive metabolic panel      Other Visit Diagnoses     Colon cancer screening    -  Primary    Relevant Orders    Cologuard                 Diagnoses and all orders for this visit:    Colon cancer screening  -     Cologuard    Type 2 diabetes mellitus with diabetic polyneuropathy, without long-term current use of insulin ([facility] )  -     Ambulatory referral to Diabetic Education; Future  -     Semaglutide 3 MG TABS; Take 3 mg by mouth daily Take on an empty stomach with 4 oz water, and wait 30 minutes before eating   -     Hemoglobin A1C; Future  -     Comprehensive metabolic panel; Future    Elevated blood pressure reading  -     Comprehensive metabolic panel; Future    Other male erectile dysfunction  -     Testosterone, free, total; Future  -     PSA Total, Diagnostic; Future  -     Testosterone 12 5 MG/ACT (1%) GEL; Place 2 actuation on the skin in the morning    Low testosterone in male  -     Testosterone, free, total; Future  -     PSA Total, Diagnostic; Future  -     Testosterone 12 5 MG/ACT (1%) GEL; Place 2 actuation on the skin in the morning    Gastroesophageal reflux disease without esophagitis    Polyneuropathy associated with underlying disease (HCC)  -     gabapentin (NEURONTIN) 600 MG tablet; Take 2 tablets (1,200 mg total) by mouth daily at bedtime            Subjective:      Patient ID: Coretta Butterfield  is a 46 y o  male  CC:    Chief Complaint   Patient presents with    Follow-up    Diabetes    Results       HPI:    Patient is here to follow-up on several issues  Diabetes:  Patient's A1c has gone up a bit since last check    Reviewed carb intake  Reviewed medications, Glucotrol 10 mg, Januvia 100 mg, Jardiance 25 mg  ED:  Patient is currently on Cialis, and does get some heartburn with it transiently  Testosterone level was also reviewed  That was quite low  Elevated blood pressure:  Noted previously  Blood pressure today was reviewed  Patient is noticing some continued symptoms with neuropathy  As best we at night, he has problems with sleeping  Notices some temperature changes as well  Wondered about increasing the Neurontin  The following portions of the patient's history were reviewed and updated as appropriate: allergies, current medications, past family history, past medical history, past social history, past surgical history and problem list       Review of Systems   Constitutional: Negative  HENT: Negative  Eyes: Negative  Respiratory: Negative  Cardiovascular: Negative  Gastrointestinal: Negative  Endocrine: Negative  Genitourinary: Negative  Musculoskeletal: Negative  Skin: Negative  Allergic/Immunologic: Negative  Neurological: Negative  Hematological: Negative  Psychiatric/Behavioral: Negative  Data to review:   Sodium 136, potassium 4 8, calcium 9 9  Sugar 155  AST 22, ALT 46  Creatinine 0 94, GFR 92  Testosterone 159, free testosterone 6 8  A1c 9 8  Microalbumin to creatinine 10  Objective:    Vitals:    05/11/22 1335   BP: 116/80   BP Location: Right arm   Patient Position: Sitting   Pulse: 100   SpO2: 98%   Weight: 104 kg (228 lb 12 8 oz)   Height: 5' 8" (1 727 m)        Physical Exam  Vitals and nursing note reviewed  Constitutional:       Appearance: Normal appearance  Neck:      Vascular: No carotid bruit  Cardiovascular:      Rate and Rhythm: Normal rate and regular rhythm  Pulses: Normal pulses  no weak pulses          Carotid pulses are 2+ on the right side and 2+ on the left side         Dorsalis pedis pulses are 2+ on the right side and 2+ on the left side  Posterior tibial pulses are 2+ on the right side and 2+ on the left side  Heart sounds: Normal heart sounds  No murmur heard  No gallop  Pulmonary:      Effort: Pulmonary effort is normal  No respiratory distress  Breath sounds: Normal breath sounds  No stridor  No wheezing, rhonchi or rales  Chest:      Chest wall: No tenderness  Feet:      Right foot:      Skin integrity: No ulcer, skin breakdown, erythema, warmth, callus or dry skin  Left foot:      Skin integrity: No ulcer, skin breakdown, erythema, warmth, callus or dry skin  Neurological:      Mental Status: He is alert  BMI Counseling: Body mass index is 34 79 kg/m²  The BMI is above normal  Nutrition recommendations include decreasing portion sizes, encouraging healthy choices of fruits and vegetables, decreasing fast food intake, consuming healthier snacks, limiting drinks that contain sugar, moderation in carbohydrate intake, increasing intake of lean protein, reducing intake of saturated and trans fat and reducing intake of cholesterol  Exercise recommendations include exercising 3-5 times per week  No pharmacotherapy was ordered  Rationale for BMI follow-up plan is due to patient being overweight or obese  Depression Screening and Follow-up Plan: Patient was screened for depression during today's encounter  They screened negative with a PHQ-2 score of 0  Diabetic Foot Exam    Patient's shoes and socks removed  Right Foot/Ankle   Right Foot Inspection  Skin Exam: skin normal and skin intact  No dry skin, no warmth, no callus, no erythema, no maceration, no abnormal color, no pre-ulcer, no ulcer and no callus  Toe Exam: ROM and strength within normal limits   No swelling, no tenderness, erythema and  no right toe deformity    Sensory   Vibration: absent  Proprioception: diminished  Monofilament testing: diminished    Vascular  Capillary refills: < 3 seconds  The right DP pulse is 2+  The right PT pulse is 2+  Left Foot/Ankle  Left Foot Inspection  Skin Exam: skin normal and skin intact  No dry skin, no warmth, no erythema, no maceration, normal color, no pre-ulcer, no ulcer and no callus  Toe Exam: ROM and strength within normal limits  No swelling, no tenderness, no erythema and no left toe deformity  Sensory   Vibration: absent  Proprioception: diminished  Monofilament testing: diminished    Vascular  Capillary refills: < 3 seconds  The left DP pulse is 2+  The left PT pulse is 2+       Assign Risk Category  No deformity present  Loss of protective sensation  No weak pulses  Risk: 1

## 2022-05-11 NOTE — ASSESSMENT & PLAN NOTE
Testosterone level is low, corresponding to some of the problems that he has been having  Reviewed about testosterone supplementation, and needing to make sure the prostate is doing relatively well 1st as well  Will check PSA, and also start on testosterone  Will need to check level in approximately 1 month  Patient does understand the testosterone is considered a controlled substance  Patient also understands that it should not be used anywhere near a female

## 2022-06-23 DIAGNOSIS — E11.42 TYPE 2 DIABETES MELLITUS WITH DIABETIC POLYNEUROPATHY, WITHOUT LONG-TERM CURRENT USE OF INSULIN (HCC): ICD-10-CM

## 2022-06-23 RX ORDER — GLIPIZIDE 10 MG/1
TABLET ORAL
Qty: 180 TABLET | Refills: 1 | Status: SHIPPED | OUTPATIENT
Start: 2022-06-23

## 2022-06-28 DIAGNOSIS — E11.42 TYPE 2 DIABETES MELLITUS WITH DIABETIC POLYNEUROPATHY, WITHOUT LONG-TERM CURRENT USE OF INSULIN (HCC): ICD-10-CM

## 2022-06-28 NOTE — TELEPHONE ENCOUNTER
TH, Pt is requesting a script for his Trulicity and Jardiance to be faxed to [facility], Is pt taking the Trulicity  I do not see this on his med list

## 2022-06-28 NOTE — TELEPHONE ENCOUNTER
The pt stated that it is not Trulicity it is the Ozempic that pt needs scripts printed and sent to [facility]

## 2022-06-29 NOTE — TELEPHONE ENCOUNTER
I don't see that on med list either  If you want him to take it, please prescribe to [facility]    Thank you

## 2022-07-27 DIAGNOSIS — E11.42 TYPE 2 DIABETES MELLITUS WITH DIABETIC POLYNEUROPATHY, WITHOUT LONG-TERM CURRENT USE OF INSULIN (HCC): ICD-10-CM

## 2022-08-10 ENCOUNTER — OFFICE VISIT (OUTPATIENT)
Dept: DIABETES SERVICES | Facility: CLINIC | Age: 52
End: 2022-08-10
Payer: COMMERCIAL

## 2022-08-10 VITALS — BODY MASS INDEX: 35.31 KG/M2 | WEIGHT: 232.2 LBS

## 2022-08-10 DIAGNOSIS — E11.42 TYPE 2 DIABETES MELLITUS WITH DIABETIC POLYNEUROPATHY, WITHOUT LONG-TERM CURRENT USE OF INSULIN (HCC): Primary | ICD-10-CM

## 2022-08-10 PROCEDURE — 98960 EDU&TRN PT SELF-MGMT NQHP 1: CPT

## 2022-08-10 NOTE — PROGRESS NOTES
Type 2 Diabetes Class Assessment    HPI: Met with Bin Quijano  for DSME Initial visit  Pancho Walton has Type 2 Diabetes  Diabetes Assessment  Visit Type: Initial visit  Present at Session: patient   Medical Diagnosis/ICD 10: E11 42 T2DM with diabetic polyneuropathy, without long-term current use of insulin  Special Learning Needs: No  Barriers to Learning: no barriers    How do you learn best? Presentation learning  What are you most interested in learning about regards to your diabetes? Needs more discipline in my lifetsyle, learn about healthy food choices  How do you feel about making lifestyle changes at this time? Ready to make changes now  How would you rate your current knowledge of diabetes? fair  How confident are you that will be able to take better control of your diabetes?: good    How long have you had diabetes? 4-5 year ago dx in [id number]  Have you had diabetes education in the past?: No  Do you have any family members with diabetes?: Yes, mother and brother  Do you monitor your blood sugar? Yes, twice a day - fasting and at bedtime  Type of blood sugar monitor: not sure, generic from [employer]  How old is your meter?: not sure, but it's pretty old   How often do you test your blood sugars?: twice a day fasting and at bedtime  Do you keep a written record of your blood sugars?  No   Blood sugar log with patient today and reviewed by educator?: No   Blood Sugar ranges:    Fasting: 160 mg/ dl   Before meals: n/a   2 hours after meals: 230-260 mg/dl at 9:00pm   Any financial concerns pertaining to your diabetes supplies, medication or care?: No  Have you ever experienced hypoglycemia?:  Yes, once 4-5 years ago had low BG levels  Have you ever been hospitalized or gone to the ER due to your blood sugars?: Yes, due to hypoglycemia  How do you treat low blood sugars?: not sure, educated today on how to treat low BG levels and hypoglycemia handouts explained  How do you treat high blood sugars? not sure, educated today on how to treat high BG levels and hyperglycemia handouts explained  Do you wear a Diabetes I D ?: no  Where do you dispose of your sharps (needles,lancetes)?: not sure, patient educated today on correct way of disposing sharps and needles as per [facility] laws  Lab Results   Component Value Date    HGBA1C 9 4 (H) 05/07/2022    HGBA1C 7 8 (H) 12/22/2021    HGBA1C 7 7 (H) 08/19/2021       No results found for: CHOL  Lab Results   Component Value Date    HDL 34 (L) 12/22/2021    HDL 40 04/01/2020     Lab Results   Component Value Date    LDLCALC 106 (H) 12/22/2021    [address] 04/01/2020     Lab Results   Component Value Date    TRIG 188 (H) 12/22/2021    TRIG 212 (H) 04/01/2020     No results found for: CHOLHDL  No results found for: Celia uLbin    Body mass index is 35 31 kg/m²      Ht Readings from Last 1 Encounters:   05/11/22 5' 8" (1 727 m)     Wt Readings from Last 1 Encounters:   08/10/22 105 kg (232 lb 3 2 oz)     Weight Change: No    Diet Assessment    Do you follow any special diet presently?: No  Who cooks at home?:  spouse  Who does the grocery shopping?: patient and spouse   How frequently do you eat out?: once/ week - steak, mashed potato and salad, coke 16oz    Activity Assessment    Exercise: no    Lifestyle/Social Assessment    Racial/ethnic group:                                       Primary Language: English  Marital Status:   Education Level: Some College (No Degree)  Work status: Full Time  Type of job and hours: 5 pm -1:30am  Who lives in your household?: spouse and children  Who is you primary support person(s): spouse   Describe your quality of life currently?: excellent  Any concerns for your safety?: No  Any Mosque or cultural practices that may affect your diabetes care: No  Do you have a decrease or loss of hearing?: No  Do you have a decrease or loss of vision?: No  When was the last time you had an ophthalmology exam?: Apr 2021  When was the last time you had dental exam?: Feb 2022  Do you check your feet for cracks, sores, debris?: Yes   When was the last time you had podiatry or foot exam?: 5/2022  Last flu shot?: Sept 2021  Pneumonia shot?: No    The patient's history was reviewed and updated as appropriate: allergies, current medications  Intervention    Diabetes Overview : Annamarie Gillespie was instructed on basic concepts of diabetes, including identifying role of diabetes self management, basic pathophysiology and types of diabetes, A1c and blood sugar targets  Annamarie Gillespie has good understanding of material covered  Taking Medications: Instructed patient on action, side effects, efficacy, prescribed dosage and appropriate timing and frequency of administration of his diabetes medication  Annamarie Gillespie has good understanding of material covered  Monitoring Blood Sugars  Instructions for Meter Teaching- Patient instructed in the following:  Site selection and skin preparation, Loading strips and lancet device, meter activation, obtaining blood sample, test strip and lancet disposal and recording log book entries  Patient has good understanding of material covered and was able to test their own blood sugar in office today  Comments: Gave and instructed on One touch Verio Reflect meter, Patient demonstrated use, blood sugar in office 296 mg/dl  at 10:05  Lot #: [id number]  Exp Date: 2022-12-31 and test strips lot # [id number],HBT date: 2023-01-31    Testing frequency: Encouraged pair testing  Test sugars before a meal and 2hr after the same meal, rotating between breakfast, lunch, and dinner  Test sugars twice a day (3 days a week, 7 days a week)  Goal Blood Sugars:   Premeal , even better <110  2hr after a meal <180, even better <140  A1C <7%, even better <6 5%      Hypoglycemia: Instructed patient on definition/risk of hypoglycemia, treatment, causes/symptoms, when to notify provider of lows, prevention of hypoglycemia and exercise precautions  Comments: Eliza Quijano verbalizes understanding of hypoglycemia concepts      Physical Activity: Discussed benefits of physical activity to optimize blood glucose control, encouraged activity at patient is physically able  Always consult a physician prior to starting an exercise program   Comments: Tran Gomez understanding of hypoglycemia concepts        Diabetes Education Record  Eliza Quijano received the following handouts: class assessment packet      Patient response to instruction    Comprehensiongood  Motivationgood  Expected Compliancegood  Response to Teachback: 100%, demonstrated understanding    Start- Stop: 9:30-10:15  Total Minutes: 45 Minutes  Group or Individual Instruction: DSMT-I  Other: Digna Boston      Thank you for referring your patient to [facility], it was a pleasure working with them today  Please feel free to call with any questions or concerns      Isela Echavarria  [address] [place] [address] Drive [id number]

## 2022-08-17 ENCOUNTER — TELEPHONE (OUTPATIENT)
Dept: FAMILY MEDICINE CLINIC | Facility: CLINIC | Age: 52
End: 2022-08-17

## 2022-08-17 DIAGNOSIS — E11.42 TYPE 2 DIABETES MELLITUS WITH DIABETIC POLYNEUROPATHY, WITHOUT LONG-TERM CURRENT USE OF INSULIN (HCC): ICD-10-CM

## 2022-08-17 NOTE — TELEPHONE ENCOUNTER
We received a shipment for this patient for Rybelsus 7 mg  Do you know anything about it? And is he supposed to be taking this? Replacing another medication?   Thank you

## 2022-08-18 ENCOUNTER — TELEPHONE (OUTPATIENT)
Dept: FAMILY MEDICINE CLINIC | Facility: CLINIC | Age: 52
End: 2022-08-18

## 2022-08-23 NOTE — TELEPHONE ENCOUNTER
Dr Federico Jackson, patient called back to clarify if he is supposed to be taking the 7 mg daily now? He has been taking 3 mg

## 2022-08-24 NOTE — TELEPHONE ENCOUNTER
Per verbal order from Dr Tariq Ang patient is to take 7mg daily of Rybelsus  Recheck labs in 3 months  Patient notified  Dr Tariq Ang, Please enter lab orders   And change medication list   Thank you

## 2022-11-11 DIAGNOSIS — G63 POLYNEUROPATHY ASSOCIATED WITH UNDERLYING DISEASE (HCC): ICD-10-CM

## 2022-11-11 RX ORDER — GABAPENTIN 600 MG/1
TABLET ORAL
Qty: 60 TABLET | Refills: 5 | Status: SHIPPED | OUTPATIENT
Start: 2022-11-11

## 2022-11-15 ENCOUNTER — TELEPHONE (OUTPATIENT)
Dept: FAMILY MEDICINE CLINIC | Facility: CLINIC | Age: 52
End: 2022-11-15

## 2022-11-28 ENCOUNTER — HOSPITAL ENCOUNTER (OUTPATIENT)
Facility: HOSPITAL | Age: 52
Setting detail: OBSERVATION
Discharge: HOME | End: 2022-11-30
Attending: EMERGENCY MEDICINE | Admitting: INTERNAL MEDICINE

## 2022-11-28 ENCOUNTER — APPOINTMENT (EMERGENCY)
Dept: CT IMAGING | Facility: HOSPITAL | Age: 52
End: 2022-11-28

## 2022-11-28 DIAGNOSIS — R11.2 NAUSEA & VOMITING: ICD-10-CM

## 2022-11-28 DIAGNOSIS — I10 HYPERTENSION: Primary | ICD-10-CM

## 2022-11-28 DIAGNOSIS — R79.89 LOW TESTOSTERONE IN MALE: ICD-10-CM

## 2022-11-28 DIAGNOSIS — R42 DIZZINESS: ICD-10-CM

## 2022-11-28 LAB
ALBUMIN SERPL BCP-MCNC: 4.2 G/DL (ref 3.5–5)
ALP SERPL-CCNC: 93 U/L (ref 46–116)
ALT SERPL W P-5'-P-CCNC: 37 U/L (ref 12–78)
ANION GAP SERPL CALCULATED.3IONS-SCNC: 9 MMOL/L (ref 4–13)
BACTERIA UR QL AUTO: ABNORMAL /HPF
BASOPHILS # BLD AUTO: 0.1 THOUSANDS/ÂΜL (ref 0–0.1)
BASOPHILS NFR BLD AUTO: 1 % (ref 0–1)
BILIRUB SERPL-MCNC: 0.48 MG/DL (ref 0.2–1)
BILIRUB UR QL STRIP: NEGATIVE
BUN SERPL-MCNC: 16 MG/DL (ref 5–25)
CALCIUM SERPL-MCNC: 9.3 MG/DL (ref 8.3–10.1)
CARDIAC TROPONIN I PNL SERPL HS: 3 NG/L
CHLORIDE SERPL-SCNC: 96 MMOL/L (ref 96–108)
CLARITY UR: CLEAR
CO2 SERPL-SCNC: 30 MMOL/L (ref 21–32)
COLOR UR: YELLOW
CREAT SERPL-MCNC: 0.94 MG/DL (ref 0.6–1.3)
EOSINOPHIL # BLD AUTO: 0.05 THOUSAND/ÂΜL (ref 0–0.61)
EOSINOPHIL NFR BLD AUTO: 0 % (ref 0–6)
ERYTHROCYTE [DISTWIDTH] IN BLOOD BY AUTOMATED COUNT: 12.2 % (ref 11.6–15.1)
GFR SERPL CREATININE-BSD FRML MDRD: 91 ML/MIN/1.73SQ M
GLUCOSE SERPL-MCNC: 251 MG/DL (ref 65–140)
GLUCOSE UR STRIP-MCNC: ABNORMAL MG/DL
HCT VFR BLD AUTO: 53.5 % (ref 36.5–49.3)
HGB BLD-MCNC: 17.9 G/DL (ref 12–17)
HGB UR QL STRIP.AUTO: NEGATIVE
IMM GRANULOCYTES # BLD AUTO: 0.2 THOUSAND/UL (ref 0–0.2)
IMM GRANULOCYTES NFR BLD AUTO: 1 % (ref 0–2)
KETONES UR STRIP-MCNC: ABNORMAL MG/DL
LEUKOCYTE ESTERASE UR QL STRIP: ABNORMAL
LYMPHOCYTES # BLD AUTO: 2.3 THOUSANDS/ÂΜL (ref 0.6–4.47)
LYMPHOCYTES NFR BLD AUTO: 16 % (ref 14–44)
MCH RBC QN AUTO: 27.1 PG (ref 26.8–34.3)
MCHC RBC AUTO-ENTMCNC: 33.5 G/DL (ref 31.4–37.4)
MCV RBC AUTO: 81 FL (ref 82–98)
MONOCYTES # BLD AUTO: 0.73 THOUSAND/ÂΜL (ref 0.17–1.22)
MONOCYTES NFR BLD AUTO: 5 % (ref 4–12)
NEUTROPHILS # BLD AUTO: 10.85 THOUSANDS/ÂΜL (ref 1.85–7.62)
NEUTS SEG NFR BLD AUTO: 77 % (ref 43–75)
NITRITE UR QL STRIP: NEGATIVE
NON-SQ EPI CELLS URNS QL MICRO: ABNORMAL /HPF
NRBC BLD AUTO-RTO: 0 /100 WBCS
PH UR STRIP.AUTO: 7 [PH] (ref 4.5–8)
PLATELET # BLD AUTO: 262 THOUSANDS/UL (ref 149–390)
PMV BLD AUTO: 9.7 FL (ref 8.9–12.7)
POTASSIUM SERPL-SCNC: 4.2 MMOL/L (ref 3.5–5.3)
PROT SERPL-MCNC: 8.2 G/DL (ref 6.4–8.4)
PROT UR STRIP-MCNC: ABNORMAL MG/DL
RBC # BLD AUTO: 6.61 MILLION/UL (ref 3.88–5.62)
RBC #/AREA URNS AUTO: ABNORMAL /HPF
SODIUM SERPL-SCNC: 135 MMOL/L (ref 135–147)
SP GR UR STRIP.AUTO: 1.01 (ref 1–1.03)
UROBILINOGEN UR QL STRIP.AUTO: 0.2 E.U./DL
WBC # BLD AUTO: 14.23 THOUSAND/UL (ref 4.31–10.16)
WBC #/AREA URNS AUTO: ABNORMAL /HPF

## 2022-11-28 RX ORDER — MECLIZINE HCL 12.5 MG/1
12.5 TABLET ORAL ONCE
Status: COMPLETED | OUTPATIENT
Start: 2022-11-28 | End: 2022-11-28

## 2022-11-28 RX ORDER — ONDANSETRON 2 MG/ML
4 INJECTION INTRAMUSCULAR; INTRAVENOUS ONCE
Status: COMPLETED | OUTPATIENT
Start: 2022-11-28 | End: 2022-11-28

## 2022-11-28 RX ADMIN — SODIUM CHLORIDE 1000 ML: 0.9 INJECTION, SOLUTION INTRAVENOUS at 21:57

## 2022-11-28 RX ADMIN — IOHEXOL 85 ML: 350 INJECTION, SOLUTION INTRAVENOUS at 22:45

## 2022-11-28 RX ADMIN — ONDANSETRON 4 MG: 2 INJECTION INTRAMUSCULAR; INTRAVENOUS at 21:59

## 2022-11-28 RX ADMIN — MECLIZINE 12.5 MG: 12.5 TABLET ORAL at 21:59

## 2022-11-29 ENCOUNTER — APPOINTMENT (OUTPATIENT)
Dept: NON INVASIVE DIAGNOSTICS | Facility: HOSPITAL | Age: 52
End: 2022-11-29

## 2022-11-29 ENCOUNTER — APPOINTMENT (OUTPATIENT)
Dept: RADIOLOGY | Facility: HOSPITAL | Age: 52
End: 2022-11-29

## 2022-11-29 PROBLEM — R42 DIZZINESS: Status: ACTIVE | Noted: 2022-11-29

## 2022-11-29 LAB
2HR DELTA HS TROPONIN: 5 NG/L
4HR DELTA HS TROPONIN: 9 NG/L
ALBUMIN SERPL BCP-MCNC: 3.7 G/DL (ref 3.5–5)
ALP SERPL-CCNC: 81 U/L (ref 46–116)
ALT SERPL W P-5'-P-CCNC: 33 U/L (ref 12–78)
AMPHETAMINES SERPL QL SCN: NEGATIVE
ANION GAP SERPL CALCULATED.3IONS-SCNC: 9 MMOL/L (ref 4–13)
AORTIC ROOT: 3.2 CM
APICAL FOUR CHAMBER EJECTION FRACTION: 54 %
APTT PPP: 24 SECONDS (ref 23–37)
ASCENDING AORTA: 3.2 CM
AST SERPL W P-5'-P-CCNC: 16 U/L (ref 5–45)
ATRIAL RATE: 81 BPM
ATRIAL RATE: 90 BPM
ATRIAL RATE: 96 BPM
ATRIAL RATE: 97 BPM
BARBITURATES UR QL: NEGATIVE
BASOPHILS # BLD AUTO: 0.07 THOUSANDS/ÂΜL (ref 0–0.1)
BASOPHILS NFR BLD AUTO: 1 % (ref 0–1)
BENZODIAZ UR QL: NEGATIVE
BILIRUB SERPL-MCNC: 0.72 MG/DL (ref 0.2–1)
BUN SERPL-MCNC: 13 MG/DL (ref 5–25)
CALCIUM SERPL-MCNC: 8.7 MG/DL (ref 8.3–10.1)
CARDIAC TROPONIN I PNL SERPL HS: 12 NG/L
CARDIAC TROPONIN I PNL SERPL HS: 8 NG/L
CHLORIDE SERPL-SCNC: 101 MMOL/L (ref 96–108)
CHOLEST SERPL-MCNC: 171 MG/DL
CO2 SERPL-SCNC: 28 MMOL/L (ref 21–32)
COCAINE UR QL: NEGATIVE
CREAT SERPL-MCNC: 0.83 MG/DL (ref 0.6–1.3)
EOSINOPHIL # BLD AUTO: 0.01 THOUSAND/ÂΜL (ref 0–0.61)
EOSINOPHIL NFR BLD AUTO: 0 % (ref 0–6)
ERYTHROCYTE [DISTWIDTH] IN BLOOD BY AUTOMATED COUNT: 12.6 % (ref 11.6–15.1)
EST. AVERAGE GLUCOSE BLD GHB EST-MCNC: 189 MG/DL
FRACTIONAL SHORTENING: 32 % (ref 28–44)
GFR SERPL CREATININE-BSD FRML MDRD: 99 ML/MIN/1.73SQ M
GLUCOSE P FAST SERPL-MCNC: 122 MG/DL (ref 65–99)
GLUCOSE SERPL-MCNC: 111 MG/DL (ref 65–140)
GLUCOSE SERPL-MCNC: 122 MG/DL (ref 65–140)
GLUCOSE SERPL-MCNC: 137 MG/DL (ref 65–140)
GLUCOSE SERPL-MCNC: 160 MG/DL (ref 65–140)
GLUCOSE SERPL-MCNC: 193 MG/DL (ref 65–140)
HBA1C MFR BLD: 8.2 %
HCT VFR BLD AUTO: 49.4 % (ref 36.5–49.3)
HDLC SERPL-MCNC: 45 MG/DL
HGB BLD-MCNC: 16.6 G/DL (ref 12–17)
IMM GRANULOCYTES # BLD AUTO: 0.14 THOUSAND/UL (ref 0–0.2)
IMM GRANULOCYTES NFR BLD AUTO: 1 % (ref 0–2)
INR PPP: 1.02 (ref 0.84–1.19)
INTERVENTRICULAR SEPTUM IN DIASTOLE (PARASTERNAL SHORT AXIS VIEW): 1.4 CM
INTERVENTRICULAR SEPTUM: 1.4 CM (ref 0.6–1.1)
LAAS-AP4: 16.9 CM2
LDLC SERPL CALC-MCNC: 104 MG/DL (ref 0–100)
LEFT ATRIUM AREA SYSTOLE SINGLE PLANE A4C: 17.2 CM2
LEFT ATRIUM SIZE: 3.2 CM
LEFT INTERNAL DIMENSION IN SYSTOLE: 3.4 CM (ref 2.1–4)
LEFT VENTRICULAR INTERNAL DIMENSION IN DIASTOLE: 5 CM (ref 3.5–6)
LEFT VENTRICULAR POSTERIOR WALL IN END DIASTOLE: 1.4 CM
LEFT VENTRICULAR STROKE VOLUME: 71 ML
LVSV (TEICH): 71 ML
LYMPHOCYTES # BLD AUTO: 2.1 THOUSANDS/ÂΜL (ref 0.6–4.47)
LYMPHOCYTES NFR BLD AUTO: 16 % (ref 14–44)
MAGNESIUM SERPL-MCNC: 1.8 MG/DL (ref 1.6–2.6)
MCH RBC QN AUTO: 27.2 PG (ref 26.8–34.3)
MCHC RBC AUTO-ENTMCNC: 33.6 G/DL (ref 31.4–37.4)
MCV RBC AUTO: 81 FL (ref 82–98)
METHADONE UR QL: NEGATIVE
MONOCYTES # BLD AUTO: 0.69 THOUSAND/ÂΜL (ref 0.17–1.22)
MONOCYTES NFR BLD AUTO: 5 % (ref 4–12)
NEUTROPHILS # BLD AUTO: 10.34 THOUSANDS/ÂΜL (ref 1.85–7.62)
NEUTS SEG NFR BLD AUTO: 77 % (ref 43–75)
NRBC BLD AUTO-RTO: 0 /100 WBCS
OPIATES UR QL SCN: NEGATIVE
OXYCODONE+OXYMORPHONE UR QL SCN: NEGATIVE
P AXIS: 45 DEGREES
P AXIS: 52 DEGREES
P AXIS: 59 DEGREES
PCP UR QL: NEGATIVE
PLATELET # BLD AUTO: 262 THOUSANDS/UL (ref 149–390)
PMV BLD AUTO: 9.7 FL (ref 8.9–12.7)
POTASSIUM SERPL-SCNC: 3.8 MMOL/L (ref 3.5–5.3)
PR INTERVAL: 148 MS
PR INTERVAL: 150 MS
PR INTERVAL: 150 MS
PROT SERPL-MCNC: 7.2 G/DL (ref 6.4–8.4)
PROTHROMBIN TIME: 13.4 SECONDS (ref 11.6–14.5)
QRS AXIS: -14 DEGREES
QRS AXIS: -15 DEGREES
QRS AXIS: -19 DEGREES
QRS AXIS: -5 DEGREES
QRSD INTERVAL: 102 MS
QRSD INTERVAL: 104 MS
QRSD INTERVAL: 96 MS
QRSD INTERVAL: 96 MS
QT INTERVAL: 338 MS
QT INTERVAL: 370 MS
QT INTERVAL: 380 MS
QT INTERVAL: 380 MS
QTC INTERVAL: 429 MS
QTC INTERVAL: 441 MS
QTC INTERVAL: 443 MS
QTC INTERVAL: 452 MS
RBC # BLD AUTO: 6.11 MILLION/UL (ref 3.88–5.62)
RIGHT ATRIUM AREA SYSTOLE A4C: 15.8 CM2
RIGHT VENTRICLE ID DIMENSION: 4.1 CM
SARS-COV-2 RNA RESP QL NAA+PROBE: NEGATIVE
SL CV LV EF: 60
SL CV PED ECHO LEFT VENTRICLE DIASTOLIC VOLUME (MOD BIPLANE) 2D: 120 ML
SL CV PED ECHO LEFT VENTRICLE SYSTOLIC VOLUME (MOD BIPLANE) 2D: 49 ML
SODIUM SERPL-SCNC: 138 MMOL/L (ref 135–147)
T WAVE AXIS: 42 DEGREES
T WAVE AXIS: 44 DEGREES
T WAVE AXIS: 47 DEGREES
T WAVE AXIS: 53 DEGREES
THC UR QL: NEGATIVE
TRIGL SERPL-MCNC: 108 MG/DL
VENTRICULAR RATE: 81 BPM
VENTRICULAR RATE: 82 BPM
VENTRICULAR RATE: 90 BPM
VENTRICULAR RATE: 97 BPM
WBC # BLD AUTO: 13.35 THOUSAND/UL (ref 4.31–10.16)

## 2022-11-29 RX ORDER — ASPIRIN 81 MG/1
81 TABLET, CHEWABLE ORAL DAILY
Status: DISCONTINUED | OUTPATIENT
Start: 2022-11-29 | End: 2022-11-30 | Stop reason: HOSPADM

## 2022-11-29 RX ORDER — GABAPENTIN 400 MG/1
1200 CAPSULE ORAL
Status: DISCONTINUED | OUTPATIENT
Start: 2022-11-29 | End: 2022-11-30 | Stop reason: HOSPADM

## 2022-11-29 RX ORDER — ATORVASTATIN CALCIUM 40 MG/1
40 TABLET, FILM COATED ORAL EVERY EVENING
Status: DISCONTINUED | OUTPATIENT
Start: 2022-11-29 | End: 2022-11-29

## 2022-11-29 RX ORDER — INSULIN LISPRO 100 [IU]/ML
1-5 INJECTION, SOLUTION INTRAVENOUS; SUBCUTANEOUS
Status: DISCONTINUED | OUTPATIENT
Start: 2022-11-29 | End: 2022-11-30 | Stop reason: HOSPADM

## 2022-11-29 RX ORDER — HYDRALAZINE HYDROCHLORIDE 20 MG/ML
5 INJECTION INTRAMUSCULAR; INTRAVENOUS EVERY 6 HOURS PRN
Status: DISCONTINUED | OUTPATIENT
Start: 2022-11-29 | End: 2022-11-30 | Stop reason: HOSPADM

## 2022-11-29 RX ORDER — HYDRALAZINE HYDROCHLORIDE 20 MG/ML
5 INJECTION INTRAMUSCULAR; INTRAVENOUS EVERY 6 HOURS PRN
Status: DISCONTINUED | OUTPATIENT
Start: 2022-11-29 | End: 2022-11-29

## 2022-11-29 RX ORDER — BUTALBITAL, ACETAMINOPHEN AND CAFFEINE 50; 325; 40 MG/1; MG/1; MG/1
1 TABLET ORAL EVERY 4 HOURS PRN
Status: DISCONTINUED | OUTPATIENT
Start: 2022-11-29 | End: 2022-11-30 | Stop reason: HOSPADM

## 2022-11-29 RX ORDER — LISINOPRIL 10 MG/1
10 TABLET ORAL DAILY
Status: DISCONTINUED | OUTPATIENT
Start: 2022-11-29 | End: 2022-11-30

## 2022-11-29 RX ORDER — AMLODIPINE BESYLATE 10 MG/1
10 TABLET ORAL DAILY
Status: DISCONTINUED | OUTPATIENT
Start: 2022-11-29 | End: 2022-11-30 | Stop reason: HOSPADM

## 2022-11-29 RX ORDER — ATORVASTATIN CALCIUM 20 MG/1
20 TABLET, FILM COATED ORAL EVERY EVENING
Status: DISCONTINUED | OUTPATIENT
Start: 2022-11-29 | End: 2022-11-30 | Stop reason: HOSPADM

## 2022-11-29 RX ADMIN — AMLODIPINE BESYLATE 10 MG: 10 TABLET ORAL at 13:48

## 2022-11-29 RX ADMIN — INSULIN LISPRO 1 UNITS: 100 INJECTION, SOLUTION INTRAVENOUS; SUBCUTANEOUS at 13:31

## 2022-11-29 RX ADMIN — LISINOPRIL 10 MG: 10 TABLET ORAL at 13:47

## 2022-11-29 RX ADMIN — SODIUM CHLORIDE 1000 ML: 0.9 INJECTION, SOLUTION INTRAVENOUS at 01:02

## 2022-11-29 RX ADMIN — ASPIRIN 81 MG CHEWABLE TABLET 81 MG: 81 TABLET CHEWABLE at 09:13

## 2022-11-29 RX ADMIN — BUTALBITAL, ACETAMINOPHEN AND CAFFEINE 1 TABLET: 50; 325; 40 TABLET ORAL at 17:05

## 2022-11-29 RX ADMIN — HYDRALAZINE HYDROCHLORIDE 5 MG: 20 INJECTION, SOLUTION INTRAMUSCULAR; INTRAVENOUS at 17:06

## 2022-11-29 RX ADMIN — ATORVASTATIN CALCIUM 20 MG: 20 TABLET, FILM COATED ORAL at 18:09

## 2022-11-29 RX ADMIN — GABAPENTIN 1200 MG: 400 CAPSULE ORAL at 21:08

## 2022-11-29 NOTE — ASSESSMENT & PLAN NOTE
Lab Results   Component Value Date    HGBA1C 9 4 (H) 05/07/2022       No results for input(s): POCGLU in the last 72 hours  Blood Sugar Average: Last 72 hrs:     Sliding scale insulin while inpatient  Check A1c  Appears A1c was 9 4 in May and patient may require insulin on discharge

## 2022-11-29 NOTE — OCCUPATIONAL THERAPY NOTE
Occupational Therapy Evaluation     Patient Name: Amelia Sosa  Today's Date: 11/29/2022  Problem List  Principal Problem:    Dizziness  Active Problems:    Type 2 diabetes mellitus (HCC)    Elevated blood pressure reading    Past Medical History  Past Medical History:   Diagnosis Date    Diabetes mellitus ([address] )     Hernia of abdominal cavity     Hypertension     Ulcer of esophagus      Past Surgical History  Past Surgical History:   Procedure Laterality Date    CHOLECYSTECTOMY      VASECTOMY             11/29/22 1015   OT Last Visit   OT Visit Date 11/29/22   Note Type   Note type Evaluation   Pain Assessment   Pain Assessment Tool 0-10   Pain Score No Pain   Restrictions/Precautions   Weight Bearing Precautions Per Order No   Other Precautions Telemetry;Multiple lines   Home Living   Type of [address] [place] [address] Multi-level;1/2 bath on main level;Bed/bath upstairs;Stairs to enter with rails  (4 TERRY)   Bathroom Shower/Tub Tub/shower unit   Bathroom Toilet Standard   Bathroom Equipment   (Denies DME)   [address]   (Denies DME)   Additional Comments Pt lives with spouse and 2 sons in a multi-level house with 4 TERRY and FOS to 2nd floor bed/full bath  Prior Function   Level of [place] Independent with ADLs; Independent with functional mobility; Independent with IADLS   Lives With Spouse; Son   IADLs Independent with driving; Independent with meal prep; Independent with medication management   Falls in the last 6 months 0   Vocational Full time employment   Comments At baseline, pt was I w/ ADLs, IADLs, and functional transfers/mobility w/o use of AD  FT employed  (+)   Denies falls PTA  Lifestyle   Autonomy At baseline, pt was I w/ ADLs, IADLs, and functional transfers/mobility w/o use of AD  FT employed  (+)   Denies falls PTA     Reciprocal Relationships Spouse, 2 sons   Service to Others FT employed-    ADL   Where Assessed Edge of bed Eating Assistance 7  Independent   Grooming Assistance 7  Independent   UB Bathing Assistance 6  Modified Independent   LB Bathing Assistance 5  Supervision/Setup   UB Dressing Assistance 6  Modified independent   LB Dressing Assistance 5  Supervision/Setup   Toileting Assistance  5  Supervision/Setup   Functional Assistance 5  Supervision/Setup   Bed Mobility   Supine to Sit 6  Modified independent   Additional items Bedrails; Increased time required   Sit to Supine 6  Modified independent   Additional items Increased time required   Additional Comments BP supine: 178/107  Pt lying supine at end of session  Call bell and phone within reach  All needs met and pt reports no further questions for OT at this time  Transfers   Sit to Stand 7  Independent   Stand to Sit 7  Independent   Functional Mobility   Functional Mobility 5  Supervision   Additional Comments Assist x1; Pt c/o slight dizziness  BP post-mobility: 197/108  Balance   Static Sitting Normal   Dynamic Sitting Good   Static Standing Fair +   Dynamic Standing Fair   Ambulatory Fair   Activity Tolerance   Activity Tolerance Patient tolerated treatment well   Medical Staff Made Chris Fox, PT   Nurse Made Aware yes; JOSÉ Gleason   RUE Assessment   RUE Assessment WNL  (4+/5 throughout)   LUE Assessment   LUE Assessment WNL  (4+/5 throughout)   Hand Function   Gross Motor Coordination Functional   Fine Motor Coordination Functional   Sensation   Light Touch Partial deficits in the RLE;Partial deficits in the LLE  (2* neuropathy, at baseline per pt)   Proprioception   Proprioception No apparent deficits   Vision-Basic Assessment   Current Vision Wears glasses all the time   Vision - Complex Assessment   Ocular Range of Motion Intact   Tracking Intact   Acuity Able to read clock/calendar on wall without difficulty; Able to read employee name badge without difficulty   Psychosocial   Psychosocial (WDL) WDL   Perception   Inattention/Neglect Appears intact Cognition   Overall Cognitive Status WFL   Arousal/Participation Alert; Cooperative   Attention Within functional limits   Orientation Level Oriented X4   Memory Within functional limits   Following Commands Follows all commands and directions without difficulty   Comments Pleasant and cooperative   Assessment   Prognosis Good   Assessment Pt is a 45 y o  male seen for OT evaluation s/p adm to [address] on 11/28/2022 w/ Dizziness and elevated blood pressure reading  CTA heck/neck: No acute intracranial abnormality  No evidence of significant stenosis or occlusion  No aneurysm  Comorbidities affecting pt’s functional performance include a significant PMH of DM, HTN  Pt with active OT orders and activity orders for Ambulate  Pt lives with spouse and 2 sons in a multi-level house with 4 TERRY and FOS to 2nd floor bed/full bath  At baseline, pt was I w/ ADLs, IADLs, and functional transfers/mobility w/o use of AD  FT employed  (+)   Denies falls PTA  Upon evaluation, pt currently functioning at a Supervision-Mod I level for overall ADLs, Mod I for bed mobility, Independent for transfers, and Supervision for functional mobility 2* the following deficits impacting occupational performance: dizziness, decreased balance and impaired sensation and personal factors of: multiple lines, steps to enter environment  These impairments, however, do not limit pt’s ability to safely engage in all baseline areas of occupation  No further acute OT needs identified at this time  Recommend continued mobilization with hospital staff while in the hospital to increase pt’s endurance and strength upon D/C  From OT standpoint, recommend D/C home with family support when medically cleared  D/C pt from OT caseload at this time     Goals   Patient Goals To go home   Plan   OT Frequency Eval only  (D/C OT)   Recommendation   OT Discharge Recommendation No rehabilitation needs   Additional Comments  The patient's raw score on the AM-PAC Daily Activity inpatient short form is 22, standardized score is 47 1, greater than 39 4  Patients at this level are likely to benefit from discharge to home  Please refer to the recommendation of the Occupational Therapist for safe discharge planning  AM-PAC Daily Activity Inpatient   Lower Body Dressing 3   Bathing 3   Toileting 4   Upper Body Dressing 4   Grooming 4   Eating 4   Daily Activity Raw Score 22   Daily Activity Standardized Score (Calc for Raw Score >=11) 47  1   AM-PAC Applied Cognition Inpatient   Following a Speech/Presentation 4   Understanding Ordinary Conversation 4   Taking Medications 4   Remembering Where Things Are Placed or Put Away 4   Remembering List of 4-5 Errands 4   Taking Care of Complicated Tasks 4   Applied Cognition Raw Score 24   Applied Cognition Standardized Score 62 21       Ashlyn Feeling, OTR/L

## 2022-11-29 NOTE — SPEECH THERAPY NOTE
Speech Language/Pathology  Pt screened  No St needs identified  Tolerating diet  Speech clear and fluent  Will d/c order

## 2022-11-29 NOTE — ED PROVIDER NOTES
History  Chief Complaint   Patient presents with   • High Blood Pressure     Pt reports high bp at home, hx of htn but does not take medications at home for bp  Pt reports vomiting 6 times  Pt actively vomiting in triage  44 yo M Pmhx of diabetes, HTN (not prescribed any medications currently), presents to the ED complaining of sudden onset dizziness, vomiting and high blood pressure  Patient states that dizziness feels like he is unsteady on his feet  With movement, he becomes nauseous and vomits  Patient says dizziness is improved with lying flat and not moving  Patient states he has vomited 6 times prior to arrival to the ED and again in triage  Patient states he has never had symptoms like this before  Upon onset of patient's symptoms, his wife took his blood pressure noted to be above 174 systolic  Patient states that he has never had blood pressure this high before  His wife states she gave him 20 mg of lisinopril for the blood pressure which is her prescription medication  He endorses a mild headache, no blurred vision or confusion  He denies eating anything out of the ordinary, no diarrhea constipation, no fevers chills or recent illnesses  Prior to Admission Medications   Prescriptions Last Dose Informant Patient Reported? Taking?    Empagliflozin (Jardiance) 25 MG TABS   No Yes   Sig: Take 1 tablet (25 mg total) by mouth every morning   Semaglutide 7 MG TABS   Yes Yes   Sig: Take 7 mg by mouth daily   Testosterone 12 5 MG/ACT (1%) GEL   No Yes   Sig: Place 2 actuation on the skin in the morning   gabapentin (NEURONTIN) 600 MG tablet   No Yes   Sig: TAKE 2 TABLETS(1200 MG) BY MOUTH DAILY AT BEDTIME   glipiZIDE (GLUCOTROL) 10 mg tablet   No Yes   Sig: TAKE 1 TABLET BY MOUTH 2 TIMES A DAY BEFORE MEALS    metFORMIN (GLUCOPHAGE) 1000 MG tablet   No Yes   Sig: TAKE 1 TABLET(1000 MG) BY MOUTH TWICE DAILY WITH MEALS   tadalafil (CIALIS) 20 MG tablet   No Yes   Sig: Take 1 PO as needed about 1 hour before sex  Facility-Administered Medications: None       Past Medical History:   Diagnosis Date   • Diabetes mellitus ([facility] [address] )    • Hernia of abdominal cavity    • Hypertension    • Ulcer of esophagus        Past Surgical History:   Procedure Laterality Date   • CHOLECYSTECTOMY     • VASECTOMY         Family History   Problem Relation Age of Onset   • Alzheimer's disease Mother    • Hypertension Mother         When younger  • Emphysema Mother         When younger   • Diabetes Maternal Grandmother    • Hypertension Maternal Grandmother    • Coronary artery disease Maternal Grandmother    • Heart attack Maternal Grandmother    • Diabetes Paternal Aunt    • No Known Problems Father         Passed from Trauma   • Crohn's disease Brother    • Diabetes Brother    • Hypertension Brother    • Cancer Brother         throat     I have reviewed and agree with the history as documented  E-Cigarette/Vaping   • E-Cigarette Use Never User      E-Cigarette/Vaping Substances     Social History     Tobacco Use   • Smoking status: Never   • Smokeless tobacco: Never   Vaping Use   • Vaping Use: Never used   Substance Use Topics   • Alcohol use: Yes     Comment: Yearly or less, 1-2 drinks  • Drug use: Never        Review of Systems   Constitutional: Negative for chills and fever  HENT: Negative for ear pain and sore throat  Eyes: Negative for pain and visual disturbance  Respiratory: Negative for cough and shortness of breath  Cardiovascular: Negative for chest pain and palpitations  Gastrointestinal: Positive for nausea and vomiting  Negative for abdominal pain  Genitourinary: Negative for dysuria and hematuria  Musculoskeletal: Negative for arthralgias and back pain  Skin: Negative for color change and rash  Neurological: Positive for dizziness and headaches  Negative for seizures and syncope  All other systems reviewed and are negative        Physical Exam  ED Triage Vitals   Temperature Pulse Respirations Blood Pressure SpO2   11/28/22 2130 11/28/22 2130 11/28/22 2130 11/28/22 2130 11/28/22 2130   98 2 °F (36 8 °C) 98 (!) 24 (!) 226/135 98 %      Temp Source Heart Rate Source Patient Position - Orthostatic VS BP Location FiO2 (%)   11/28/22 2130 11/28/22 2130 11/28/22 2300 11/28/22 2130 --   Oral Monitor Lying Right arm       Pain Score       11/29/22 0206       2             Orthostatic Vital Signs  Vitals:    11/29/22 2009 11/29/22 2050 11/29/22 2353 11/30/22 0500   BP: (!) 187/99 (!) 183/114 164/86 153/98   Pulse: 101 100 97 88   Patient Position - Orthostatic VS:  Lying Lying Lying       Physical Exam  Vitals and nursing note reviewed  Constitutional:       General: He is not in acute distress  Appearance: He is well-developed  He is obese  HENT:      Head: Normocephalic and atraumatic  Eyes:      Conjunctiva/sclera: Conjunctivae normal    Cardiovascular:      Rate and Rhythm: Normal rate and regular rhythm  Heart sounds: No murmur heard  Pulmonary:      Effort: Pulmonary effort is normal  No respiratory distress  Breath sounds: Normal breath sounds  Abdominal:      Palpations: Abdomen is soft  Tenderness: There is no abdominal tenderness  Musculoskeletal:         General: No swelling  Cervical back: Neck supple  Skin:     General: Skin is warm and dry  Capillary Refill: Capillary refill takes less than 2 seconds  Neurological:      General: No focal deficit present  Mental Status: He is alert and oriented to person, place, and time  GCS: GCS eye subscore is 4  GCS verbal subscore is 5  GCS motor subscore is 6  Cranial Nerves: No cranial nerve deficit  Sensory: No sensory deficit  Motor: No weakness        Gait: Gait normal    Psychiatric:         Mood and Affect: Mood normal          ED Medications  Medications   sodium chloride 0 9 % bolus 1,000 mL (0 mL Intravenous Stopped 11/29/22 0100)   ondansetron (ZOFRAN) injection 4 mg (4 mg Intravenous Given 11/28/22 2159)   meclizine (ANTIVERT) tablet 12 5 mg (12 5 mg Oral Given 11/28/22 2159)   iohexol (OMNIPAQUE) 350 MG/ML injection (SINGLE-DOSE) 85 mL (85 mL Intravenous Given 11/28/22 2245)   sodium chloride 0 9 % bolus 1,000 mL (0 mL Intravenous Stopped 11/29/22 0500)       Diagnostic Studies  Results Reviewed     Procedure Component Value Units Date/Time    Basic metabolic panel [[id number]]  (Abnormal) Collected: 11/30/22 0538    Lab Status: Final result Specimen: Blood from Hand, Left Updated: 11/30/22 0700     Sodium 136 mmol/L      Potassium 3 6 mmol/L      Chloride 101 mmol/L      CO2 24 mmol/L      ANION GAP 11 mmol/L      BUN 18 mg/dL      Creatinine 0 82 mg/dL      Glucose 151 mg/dL      Calcium 8 7 mg/dL      eGFR 100 ml/min/1 73sq m     Narrative:      [place] guidelines for Chronic Kidney Disease (CKD):   •  Stage 1 with normal or high GFR (GFR > 90 mL/min/1 73 square meters)  •  Stage 2 Mild CKD (GFR = 60-89 mL/min/1 73 square meters)  •  Stage 3A Moderate CKD (GFR = 45-59 mL/min/1 73 square meters)  •  Stage 3B Moderate CKD (GFR = 30-44 mL/min/1 73 square meters)  •  Stage 4 Severe CKD (GFR = 15-29 mL/min/1 73 square meters)  •  Stage 5 End Stage CKD (GFR <15 mL/min/1 73 square meters)  Note: GFR calculation is accurate only with a steady state creatinine    CBC and differential [[id number]]  (Abnormal) Collected: 11/30/22 0538    Lab Status: Final result Specimen: Blood from Hand, Left Updated: 11/30/22 0557     WBC 11 21 Thousand/uL      RBC 6 08 Million/uL      Hemoglobin 16 5 g/dL      Hematocrit 49 5 %      MCV 81 fL      MCH 27 1 pg      MCHC 33 3 g/dL      RDW 12 8 %      MPV 9 5 fL      Platelets 830 Thousands/uL      nRBC 0 /100 WBCs      Neutrophils Relative 62 %      Immat GRANS % 1 %      Lymphocytes Relative 28 %      Monocytes Relative 7 %      Eosinophils Relative 1 %      Basophils Relative 1 %      Neutrophils Absolute 6 95 Thousands/µL Immature Grans Absolute 0 11 Thousand/uL      Lymphocytes Absolute 3 08 Thousands/µL      Monocytes Absolute 0 81 Thousand/µL      Eosinophils Absolute 0 16 Thousand/µL      Basophils Absolute 0 10 Thousands/µL     Fingerstick Glucose (POCT) [[id number]]  (Normal) Collected: 11/29/22 1803    Lab Status: Final result Updated: 11/29/22 1804     POC Glucose 137 mg/dl     Fingerstick Glucose (POCT) [[id number]]  (Abnormal) Collected: 11/29/22 1213    Lab Status: Final result Updated: 11/29/22 1214     POC Glucose 160 mg/dl     Hemoglobin A1c w/EAG Estimation [[id number]]  (Abnormal) Collected: 11/29/22 0628    Lab Status: Final result Specimen: Blood from Arm, Right Updated: 11/29/22 1028     Hemoglobin A1C 8 2 %       mg/dl     Rapid drug screen, urine [[id number]]  (Normal) Collected: 11/29/22 0631    Lab Status: Final result Specimen: Urine, Clean Catch Updated: 11/29/22 1023     Amph/Meth UR Negative     Barbiturate Ur Negative     Benzodiazepine Urine Negative     Cocaine Urine Negative     Methadone Urine Negative     Opiate Urine Negative     PCP Ur Negative     THC Urine Negative     Oxycodone Urine Negative    Narrative:      FOR MEDICAL PURPOSES ONLY  IF CONFIRMATION NEEDED PLEASE CONTACT THE LAB WITHIN 5 DAYS      Drug Screen Cutoff Levels:  AMPHETAMINE/METHAMPHETAMINES  1000 ng/mL  BARBITURATES     200 ng/mL  BENZODIAZEPINES     200 ng/mL  COCAINE      300 ng/mL  METHADONE      300 ng/mL  OPIATES      300 ng/mL  PHENCYCLIDINE     25 ng/mL  THC       50 ng/mL  OXYCODONE      100 ng/mL    Lipid Panel with Direct LDL reflex [[id number]]  (Abnormal) Collected: 11/29/22 0628    Lab Status: Final result Specimen: Blood from Arm, Right Updated: 11/29/22 0804     Cholesterol 171 mg/dL      Triglycerides 108 mg/dL      HDL, Direct 45 mg/dL      LDL Calculated 104 mg/dL     Comprehensive metabolic panel [[id number]]  (Abnormal) Collected: 11/29/22 0628    Lab Status: Final result Specimen: Blood from Arm, Right Updated: 11/29/22 0803     Sodium 138 mmol/L      Potassium 3 8 mmol/L      Chloride 101 mmol/L      CO2 28 mmol/L      ANION GAP 9 mmol/L      BUN 13 mg/dL      Creatinine 0 83 mg/dL      Glucose 122 mg/dL      Glucose, Fasting 122 mg/dL      Calcium 8 7 mg/dL      AST 16 U/L      ALT 33 U/L      Alkaline Phosphatase 81 U/L      Total Protein 7 2 g/dL      Albumin 3 7 g/dL      Total Bilirubin 0 72 mg/dL      eGFR 99 ml/min/1 73sq m     Narrative:      [place] guidelines for Chronic Kidney Disease (CKD):   •  Stage 1 with normal or high GFR (GFR > 90 mL/min/1 73 square meters)  •  Stage 2 Mild CKD (GFR = 60-89 mL/min/1 73 square meters)  •  Stage 3A Moderate CKD (GFR = 45-59 mL/min/1 73 square meters)  •  Stage 3B Moderate CKD (GFR = 30-44 mL/min/1 73 square meters)  •  Stage 4 Severe CKD (GFR = 15-29 mL/min/1 73 square meters)  •  Stage 5 End Stage CKD (GFR <15 mL/min/1 73 square meters)  Note: GFR calculation is accurate only with a steady state creatinine    Magnesium [[id number]]  (Normal) Collected: 11/29/22 0628    Lab Status: Final result Specimen: Blood from Arm, Right Updated: 11/29/22 0803     Magnesium 1 8 mg/dL     Protime-INR [[id number]]  (Normal) Collected: 11/29/22 0628    Lab Status: Final result Specimen: Blood from Arm, Right Updated: 11/29/22 0712     Protime 13 4 seconds      INR 1 02    APTT [[id number]]  (Normal) Collected: 11/29/22 0628    Lab Status: Final result Specimen: Blood from Arm, Right Updated: 11/29/22 0712     PTT 24 seconds     Fingerstick Glucose (POCT) [[id number]]  (Normal) Collected: 11/29/22 0700    Lab Status: Final result Updated: 11/29/22 0701     POC Glucose 111 mg/dl     CBC and differential [[id number]]  (Abnormal) Collected: 11/29/22 0628    Lab Status: Final result Specimen: Blood from Arm, Right Updated: 11/29/22 0655     WBC 13 35 Thousand/uL      RBC 6 11 Million/uL      Hemoglobin 16 6 g/dL      Hematocrit 49 4 %      MCV 81 fL      MCH 27 2 pg      MCHC 33 6 g/dL      RDW 12 6 %      MPV 9 7 fL      Platelets 373 Thousands/uL      nRBC 0 /100 WBCs      Neutrophils Relative 77 %      Immat GRANS % 1 %      Lymphocytes Relative 16 %      Monocytes Relative 5 %      Eosinophils Relative 0 %      Basophils Relative 1 %      Neutrophils Absolute 10 34 Thousands/µL      Immature Grans Absolute 0 14 Thousand/uL      Lymphocytes Absolute 2 10 Thousands/µL      Monocytes Absolute 0 69 Thousand/µL      Eosinophils Absolute 0 01 Thousand/µL      Basophils Absolute 0 07 Thousands/µL     COVID only [[id number]]  (Normal) Collected: 11/29/22 0231    Lab Status: Final result Specimen: Nares from Nose Updated: 11/29/22 0405     SARS-CoV-2 Negative    Narrative:      FOR PEDIATRIC PATIENTS - copy/paste COVID Guidelines URL to browser: [email or web address]    SARS-CoV-2 assay is a Nucleic Acid Amplification assay intended for the  qualitative detection of nucleic acid from SARS-CoV-2 in nasopharyngeal  swabs  Results are for the presumptive identification of SARS-CoV-2 RNA  Positive results are indicative of infection with SARS-CoV-2, the virus  causing COVID-19, but do not rule out bacterial infection or co-infection  with other viruses  Laboratories within the [place] and its  territories are required to report all positive results to the appropriate  public health authorities  Negative results do not preclude SARS-CoV-2  infection and should not be used as the sole basis for treatment or other  patient management decisions  Negative results must be combined with  clinical observations, patient history, and epidemiological information  This test has not been FDA cleared or approved  This test has been authorized by FDA under an Emergency Use Authorization  (EUA)   This test is only authorized for the duration of time the  declaration that circumstances exist justifying the authorization of the  emergency use of an in vitro diagnostic tests for detection of SARS-CoV-2  virus and/or diagnosis of COVID-19 infection under section 564(b)(1) of  the Act, 21 U  S C  272EIY-6(H)(9), unless the authorization is terminated  or revoked sooner  The test has been validated but independent review by FDA  and CLIA is pending  Test performed using [employer] GeneXpert: This RT-PCR assay targets N2,  a region unique to SARS-CoV-2  A conserved region in the E-gene was chosen  for pan-Sarbecovirus detection which includes SARS-CoV-2  According to CMS-2020-01-R, this platform meets the definition of high-throughput technology  HS Troponin I 4hr [[id number]]  (Normal) Collected: 11/29/22 0210    Lab Status: Final result Specimen: Blood from Arm, Right Updated: 11/29/22 0248     hs TnI 4hr 12 ng/L      Delta 4hr hsTnI 9 ng/L     HS Troponin I 2hr [[id number]]  (Normal) Collected: 11/29/22 0008    Lab Status: Final result Specimen: Blood from Arm, Right Updated: 11/29/22 0102     hs TnI 2hr 8 ng/L      Delta 2hr hsTnI 5 ng/L     Urine Microscopic [[id number]]  (Abnormal) Collected: 11/28/22 2244    Lab Status: Final result Specimen: Urine, Clean Catch Updated: 11/28/22 2301     RBC, UA 0-1 /hpf      WBC, UA 0-1 /hpf      Epithelial Cells None Seen /hpf      Bacteria, UA Occasional /hpf     Urine Macroscopic, POC [[id number]]  (Abnormal) Collected: 11/28/22 2244    Lab Status: Final result Specimen: Urine Updated: 11/28/22 2246     Color, UA Yellow     Clarity, UA Clear     pH, UA 7 0     Leukocytes, UA Elevated glucose may cause decreased leukocyte values   See urine microscopic for [facility] result/     Nitrite, UA Negative     Protein,  (2+) mg/dl      Glucose, UA >=1000 (1%) mg/dl      Ketones, UA 40 (2+) mg/dl      Urobilinogen, UA 0 2 E U /dl      Bilirubin, UA Negative     Occult Blood, UA Negative     Specific Gravity, UA 1 015    Narrative:      CLINITEK RESULT    HS Troponin 0hr (reflex protocol) [[id number]] (Normal) Collected: 11/28/22 2201    Lab Status: Final result Specimen: Blood from Arm, Right Updated: 11/28/22 2231     hs TnI 0hr 3 ng/L     Comprehensive metabolic panel [[id number]]  (Abnormal) Collected: 11/28/22 2201    Lab Status: Final result Specimen: Blood from Arm, Right Updated: 11/28/22 2225     Sodium 135 mmol/L      Potassium 4 2 mmol/L      Chloride 96 mmol/L      CO2 30 mmol/L      ANION GAP 9 mmol/L      BUN 16 mg/dL      Creatinine 0 94 mg/dL      Glucose 251 mg/dL      Calcium 9 3 mg/dL      AST --     ALT 37 U/L      Alkaline Phosphatase 93 U/L      Total Protein 8 2 g/dL      Albumin 4 2 g/dL      Total Bilirubin 0 48 mg/dL      eGFR 91 ml/min/1 73sq m     Narrative:      [place] guidelines for Chronic Kidney Disease (CKD):   •  Stage 1 with normal or high GFR (GFR > 90 mL/min/1 73 square meters)  •  Stage 2 Mild CKD (GFR = 60-89 mL/min/1 73 square meters)  •  Stage 3A Moderate CKD (GFR = 45-59 mL/min/1 73 square meters)  •  Stage 3B Moderate CKD (GFR = 30-44 mL/min/1 73 square meters)  •  Stage 4 Severe CKD (GFR = 15-29 mL/min/1 73 square meters)  •  Stage 5 End Stage CKD (GFR <15 mL/min/1 73 square meters)  Note: GFR calculation is accurate only with a steady state creatinine    CBC and differential [[id number]]  (Abnormal) Collected: 11/28/22 2201    Lab Status: Final result Specimen: Blood from Arm, Right Updated: 11/28/22 2209     WBC 14 23 Thousand/uL      RBC 6 61 Million/uL      Hemoglobin 17 9 g/dL      Hematocrit 53 5 %      MCV 81 fL      MCH 27 1 pg      MCHC 33 5 g/dL      RDW 12 2 %      MPV 9 7 fL      Platelets 249 Thousands/uL      nRBC 0 /100 WBCs      Neutrophils Relative 77 %      Immat GRANS % 1 %      Lymphocytes Relative 16 %      Monocytes Relative 5 %      Eosinophils Relative 0 %      Basophils Relative 1 %      Neutrophils Absolute 10 85 Thousands/µL      Immature Grans Absolute 0 20 Thousand/uL      Lymphocytes Absolute 2 30 Thousands/µL Monocytes Absolute 0 73 Thousand/µL      Eosinophils Absolute 0 05 Thousand/µL      Basophils Absolute 0 10 Thousands/µL                  XR chest portable   Final Result by Charla Frye MD (11/29 1202)      No acute pulmonary pathology  Workstation performed: [id number]         CTA head and neck with and without contrast   Final Result by Justin Moreau MD (11/29 0106)      No acute intracranial abnormality  Mild chronic microangiopathic changes  No evidence of hemodynamically significant stenosis or occlusion within the major vessels of the [place]  No aneurysm  No significant stenosis in the neck  Workstation performed: [id number]               Procedures  Procedures      ED Course  ED Course as of 11/30/22 1654   Tue Nov 29, 2022   0147 Contacted [facility] for admission                              SBIRT 20yo+    Flowsheet Row Most Recent Value   SBIRT (23 yo +)    In order to provide better care to our patients, we are screening all of our patients for alcohol and drug use  Would it be okay to ask you these screening questions? No Filed at: 11/28/2022 2205                MDM  Number of Diagnoses or Management Options  Dizziness  Hypertension  Nausea & vomiting  Diagnosis management comments: 46 yo M Pmhx of diabetes, HTN (not prescribed any medications currently), presents to the ED complaining of sudden onset dizziness, vomiting and high blood pressure  DDx: Hypertensive emergency vs symptomatic hypertension, dehydration, DKA, hypertensive vs posterior circulation stroke  Patient actively vomiting on initial assessment  Will treat symptomatically with Zofran, meclizine and fluids  Lab significant for hemoconcentration and mild hyperglycemia without anion gap  Patient hydrated with 2 L fluid and symptoms significantly improved  Due to patient's history of vertigo continued even at rest, concern for posterior circulation stroke    CTA head and neck unremarkable  Case discussed with medicine, patient admitted for inpatient MRI to evaluate for possible posterior circulation stroke/insufficiency not able to be visualized on CTA  Disposition  Final diagnoses:   Hypertension   Dizziness   Nausea & vomiting     Time reflects when diagnosis was documented in both MDM as applicable and the Disposition within this note     Time User Action Codes Description Comment    11/29/2022  1:58 AM Judythe [place] Add [I10] Hypertension     11/29/2022  1:58 AM Judythe [place] Add [R42] Dizziness     11/29/2022  1:58 AM Judythe [place] Add [R11 2] Nausea & vomiting     11/29/2022  3:50 AM Jorge Tobias Add [R79 89] Low testosterone in male       ED Disposition     ED Disposition   Admit    Condition   Stable    Date/Time   Tue Nov 29, 2022  1:58 AM    Comment   Case was discussed with Dr Alma Stephenson and the patient's admission status was agreed to be Admission Status: inpatient status to the service of Dr Alma Stephenson  Follow-up Information     Follow up With Specialties Details Why Contact Info Additional [facility] Follow up in 6 week(s) The office will call you to schedule follow-up appointment  If you do not hear from them within 1 week after discharge, please call them to schedule   [address] [id number]  [address] [place] [address], [address] [place] [address], [address]          Discharge Medication List as of 11/30/2022 11:17 AM      START taking these medications    Details   amLODIPine (NORVASC) 10 mg tablet Take 1 tablet (10 mg total) by mouth daily, Starting Wed 11/30/2022, Until Fri 12/30/2022, Normal      aspirin 81 mg chewable tablet Chew 1 tablet (81 mg total) daily, Starting Wed 11/30/2022, Until Fri 12/30/2022, Normal      atorvastatin (LIPITOR) 20 mg tablet Take 1 tablet (20 mg total) by mouth every evening, Starting Wed 11/30/2022, Until Fri 12/30/2022, Normal      lisinopril (ZESTRIL) 20 mg tablet Take 1 tablet (20 mg total) by mouth daily, Starting Wed 11/30/2022, Until Fri 12/30/2022, Normal         CONTINUE these medications which have NOT CHANGED    Details   Empagliflozin (Jardiance) 25 MG TABS Take 1 tablet (25 mg total) by mouth every morning, Starting Wed 6/29/2022, Until Mon 12/26/2022, Print      gabapentin (NEURONTIN) 600 MG tablet TAKE 2 TABLETS(1200 MG) BY MOUTH DAILY AT BEDTIME, Normal      glipiZIDE (GLUCOTROL) 10 mg tablet TAKE 1 TABLET BY MOUTH 2 TIMES A DAY BEFORE MEALS , Normal      metFORMIN (GLUCOPHAGE) 1000 MG tablet TAKE 1 TABLET(1000 MG) BY MOUTH TWICE DAILY WITH MEALS, Normal      Semaglutide 7 MG TABS Take 7 mg by mouth daily, Historical Med      tadalafil (CIALIS) 20 MG tablet Take 1 PO as needed about 1 hour before sex , Normal      Testosterone 12 5 MG/ACT (1%) GEL Place 2 actuation on the skin in the morning, Starting Wed 5/11/2022, Normal           Outpatient Discharge Orders   Activity as tolerated       PDMP Review       Value Time User    PDMP Reviewed  Yes 5/11/2022  2:30 PM See Martinez MD           ED Provider  Attending physically available and evaluated Dayanara Masters    ISRAEL managed the patient along with the ED Attending      Electronically Signed by         Hailey Guerrero MD  11/30/22 0597

## 2022-11-29 NOTE — ASSESSMENT & PLAN NOTE
-systolic blood pressure in the low 200s in the ER  -in the setting of dizziness and sensation of disequilibrium  -history of hypertension but reportedly not on PTA medications  Plan  > as we currently have concern for possible central cause of dizziness with onset earlier in the afternoon,  we will allow for permissive hypertension for 24 hours

## 2022-11-29 NOTE — CONSULTS
Consultation - Neurology   Aashish Gorman  45 y o  male MRN: [id number]  Unit/Bed#: ED 27 Encounter: [id number]      Assessment/Plan     * Dizziness  Assessment & Plan  45 y o  male with diabetes with polyneuropathy, obesity, history of elevated BP readings (however not on any antihypertensive medications), and low testosterone on supplementation, who presented to [facility] on 11/28/22 following the development of sudden onset dizziness (described as unsteadiness like being on a boat or being intoxicated), nausea, recurrent vomiting, and elevated BP which was 226/135 on arrival  The patient's symptoms have nearly resolved this morning though BP remains elevated with SBPs in the 180s  Workup:  - CTA H/N negative for acute intracranial abnormality and large vessel occlusion or flow limiting stenosis  Mild chronic microangiopathic changes noted  - Total cholesterol 171,    - A1C 8 2  - Echo: EF 50%  Normal sized atria bilaterally    Neuro exam is entirely unremarkable aside from findings consistent with known neuropathy  Case reviewed with Dr Rey Owen who also spoke with the patient  Suspect etiology to be hypertensive induced vs small stroke/TIA  Would recommend initiating Aspirin 81mg and low dose statin regardless due to patient's risk factors  As patient's symptoms have near resolved, CTA negative, Echo normal, and as MRI would not  at this time ok to hold off on MRI brain  Patient's BP remains elevated at this time  Will defer to primary team to initiate antihypertensive regimen  Suspect there will continue to be an element of hypertension while inpatient due to his reports of significant white coat hypertension in the past  Would recommend goal of 130s-150s for discharge with close follow-up with PCP       Plan   - Continue Aspirin 81mg and Atorvastatin 20mg (lower dose as patient's  and hesitant to be on multiple medications)  - Goal of normotension, but at least systolics 130s-150s prior to discharge with close PCP follow-up  - Will defer to primary team for initiation of antihypertensives as well as if any additional workup is needed for persistent hypertension  - Would recommend checking BP 2 to 3 times a day and recording the values and following up with PCP to ensure her optimal antihypertensive regimen  - Euglycemic, normothermic goal  - Continue telemetry  - Stroke education  - Frequent neuro checks  Continue to monitor and notify neurology with any changes   - STAT CT head for any acute change in neuro exam  - Medical management and supportive care per primary team  Correction of any metabolic or infectious disturbances  Ana Cristina Vickers  will need follow up in in 6 weeks with neurovascular attending or advance practitioner  He will not require outpatient neurological testing  History of Present Illness     Reason for Consult / Principal Problem: Dizziness  Hx and PE limited by: NA  HPI: Ana Cristina Vickers  is a 45 y o  right handed male with diabetes with polyneuropathy, obesity, history of elevated BP readings (however not on any antihypertensive medications), low testosterone on supplementation, who presented to [facility] on 11/28/22 with sudden onset dizziness, vomiting, and elevated BP  BP on arrival 226/135  Per chart review, pt antiplatelet naive PTA  Per ED notes, pt described the dizziness as feeling unsteady on his feet  With movement, pt became nauseous with recurrent vomitting  Dizziness reportedly improved when lying flat and not moving  Per ED provider discussion with patient's wife, home SBP >220  Pt's wife gave pt her prescribed home lisinopril 20 mg while at home, though he subsequently vomited  CTA H/N wwo contrast in ED revealed no acute intracranial abnormality, no evidence of hemodynamically significant stenosis or occlusion within the major vessels of the [place], no aneurysm, and no significant stenosis in the neck   Per admission note, pt was given meclizine in ED with minimal improvement in dizziness  This morning, during my evaluation with the patient he reports feeling much better  He states that his symptoms are near resolved with only a very subtle feeling of “being a little off " He further explained the sensation that he had yesterday as imbalance similar to rocking on a boat or being intoxicated  He denies vertiginous sensation including denying room spinning, a sense of being pulled or pushed in a specific direction  Patient does report having 3 or 4 somewhat similar episodes in the past though states that these were for seconds at a time with change of position  None of the prior events persisted or escalated in severity the way this episode did  Patient acknowledges having hypertension at baseline at home as well as an element of anxiety/white coat hypertension  He states that he has been working with his physician on this and was hoping to be able to lose weight to help manage it  He is not currently on an antihypertensive regimen  The patient states that he has not checked his blood pressure is in several months  In the past he has seen blood pressure is up to 150s at home and higher when he is at a doctor's office or going for procedure  Inpatient consult to Neurology  Consult performed by: Damian Mercado PA-C  Consult ordered by: Thomas Rothman DO          Review of Systems   Constitutional: Negative for diaphoresis and fever  HENT: Negative for trouble swallowing  Eyes: Negative for visual disturbance  Respiratory: Negative for cough and shortness of breath  Cardiovascular: Negative for chest pain  Gastrointestinal: Negative for abdominal pain  Genitourinary: Negative  Musculoskeletal:        Back/neck spasms   Neurological: Positive for dizziness (trace)  Negative for syncope, speech difficulty, weakness and numbness     Psychiatric/Behavioral: The patient is nervous/anxious (with hwhite coat hypertension)  A 12 point ROS was completed and other than the above mentioned symptoms and those noted in the HPI, all remaining systems were negative  Historical Information   Past Medical History:   Diagnosis Date   • Diabetes mellitus ([address] )    • Hernia of abdominal cavity    • Hypertension    • Ulcer of esophagus      Past Surgical History:   Procedure Laterality Date   • CHOLECYSTECTOMY     • VASECTOMY       Social History   Social History     Substance and Sexual Activity   Alcohol Use Yes    Comment: Yearly or less, 1-2 drinks  Social History     Substance and Sexual Activity   Drug Use Never     E-Cigarette/Vaping   • E-Cigarette Use Never User      E-Cigarette/Vaping Substances     Social History     Tobacco Use   Smoking Status Never   Smokeless Tobacco Never     Family History:   Family History   Problem Relation Age of Onset   • Alzheimer's disease Mother    • Hypertension Mother         When younger  • Emphysema Mother         When younger   • Diabetes Maternal Grandmother    • Hypertension Maternal Grandmother    • Coronary artery disease Maternal Grandmother    • Heart attack Maternal Grandmother    • Diabetes Paternal Aunt    • No Known Problems Father         Passed from Trauma   • Crohn's disease Brother    • Diabetes Brother    • Hypertension Brother    • Cancer Brother         throat       Review of previous medical records was  completed       Meds/Allergies   current meds:   Current Facility-Administered Medications   Medication Dose Route Frequency   • amLODIPine (NORVASC) tablet 10 mg  10 mg Oral Daily   • aspirin chewable tablet 81 mg  81 mg Oral Daily   • atorvastatin (LIPITOR) tablet 20 mg  20 mg Oral QPM   • butalbital-acetaminophen-caffeine (FIORICET,ESGIC) -40 mg per tablet 1 tablet  1 tablet Oral Q4H PRN   • hydrALAZINE (APRESOLINE) injection 5 mg  5 mg Intravenous Q6H PRN   • insulin lispro (HumaLOG) 100 units/mL subcutaneous injection 1-5 Units  1-5 Units Subcutaneous TID AC   • lisinopril (ZESTRIL) tablet 10 mg  10 mg Oral Daily    and PTA meds:   Prior to Admission Medications   Prescriptions Last Dose Informant Patient Reported? Taking? Empagliflozin (Jardiance) 25 MG TABS   No No   Sig: Take 1 tablet (25 mg total) by mouth every morning   Semaglutide 7 MG TABS   Yes No   Sig: Take 7 mg by mouth daily   Testosterone 12 5 MG/ACT (1%) GEL   No No   Sig: Place 2 actuation on the skin in the morning   gabapentin (NEURONTIN) 600 MG tablet   No No   Sig: TAKE 2 TABLETS(1200 MG) BY MOUTH DAILY AT BEDTIME   glipiZIDE (GLUCOTROL) 10 mg tablet   No No   Sig: TAKE 1 TABLET BY MOUTH 2 TIMES A DAY BEFORE MEALS    metFORMIN (GLUCOPHAGE) 1000 MG tablet   No No   Sig: TAKE 1 TABLET(1000 MG) BY MOUTH TWICE DAILY WITH MEALS   tadalafil (CIALIS) 20 MG tablet   No No   Sig: Take 1 PO as needed about 1 hour before sex  Facility-Administered Medications: None       No Known Allergies    Objective   Vitals:Blood pressure (!) 182/96, pulse 101, temperature 98 2 °F (36 8 °C), temperature source Oral, resp  rate 18, height 5' 9" (1 753 m), weight 103 kg (226 lb), SpO2 95 %  ,Body mass index is 33 37 kg/m²  Intake/Output Summary (Last 24 hours) at 11/29/2022 1752  Last data filed at 11/29/2022 0500  Gross per 24 hour   Intake 2000 ml   Output --   Net 2000 ml       Invasive Devices: Invasive Devices     Peripheral Intravenous Line  Duration           Peripheral IV 11/28/22 Right Antecubital <1 day                Physical Exam  Constitutional:       General: He is not in acute distress  Appearance: Normal appearance  He is well-developed  He is not ill-appearing  HENT:      Head: Normocephalic and atraumatic  Eyes:      General: No scleral icterus  Right eye: No discharge  Left eye: No discharge        Extraocular Movements: Extraocular movements intact and EOM normal       Conjunctiva/sclera: Conjunctivae normal    Cardiovascular:      Rate and Rhythm: Normal rate and regular rhythm  Pulmonary:      Effort: Pulmonary effort is normal  No respiratory distress  Breath sounds: No stridor  Musculoskeletal:         General: No tenderness or deformity  Normal range of motion  Cervical back: Normal range of motion and neck supple  Lymphadenopathy:      Cervical: No cervical adenopathy  Skin:     General: Skin is warm and dry  Findings: No erythema or rash  Neurological:      Mental Status: He is alert and oriented to person, place, and time  Motor: Motor strength is normal       Coordination: Finger-Nose-Finger Test, Heel to [employer] and Romberg Test normal       Gait: Gait is intact  Tandem walk normal    Psychiatric:         Speech: Speech normal          Behavior: Behavior normal          Thought Content: Thought content normal          Judgment: Judgment normal         Neurologic Exam     Mental Status   Oriented to person, place, and time  Follows 2 step commands  Attention: normal  Concentration: normal    Speech: speech is normal (No dysarthria or aphasia)  Level of consciousness: alert  Knowledge: good  Normal comprehension  Cranial Nerves     CN II   Visual acuity: normal (grossly)  Right visual field deficit: none  Left visual field deficit: none     CN III, IV, VI   Extraocular motions are normal    Nystagmus: none   Conjugate gaze: present    CN V   Facial sensation intact  CN VII   Facial expression full, symmetric  CN VIII   Hearing: intact    CN XI   CN XI normal      CN XII   Tongue deviation: none    Motor Exam   Muscle bulk: normal  Overall muscle tone: normal  Right arm pronator drift: absent  Left arm pronator drift: absent    Strength   Strength 5/5 throughout       Sensory Exam   Light touch normal    Right arm vibration: normal  Left arm vibration: normal  Right leg vibration: decreased from toes  Left leg vibration: decreased from toes  Temperature decreased symmetrically in distal BLEs     Gait, Coordination, and Reflexes     Gait  Gait: normal (subjective subtle report of feeling "just a little off")    Coordination   Romberg: negative  Finger to nose coordination: normal  Heel to shin coordination: normal  Tandem walking coordination: normal    Tremor   Resting tremor: absent    Reflexes   Right plantar: normal  Left plantar: normal  DTR 1-2+ throughout, symmetric    HINTS negative, test of skew negative            Lab Results:   CBC:   Results from last 7 days   Lab Units 11/29/22 0628 11/28/22  2201   WBC Thousand/uL 13 35* 14 23*   RBC Million/uL 6 11* 6 61*   HEMOGLOBIN g/dL 16 6 17 9*   HEMATOCRIT % 49 4* 53 5*   MCV fL 81* 81*   PLATELETS Thousands/uL 262 262   , BMP/CMP:   Results from last 7 days   Lab Units 11/29/22 0628 11/28/22 2201   SODIUM mmol/L 138 135   POTASSIUM mmol/L 3 8 4 2   CHLORIDE mmol/L 101 96   CO2 mmol/L 28 30   BUN mg/dL 13 16   CREATININE mg/dL 0 83 0 94   CALCIUM mg/dL 8 7 9 3   AST U/L 16  --    ALT U/L 33 37   ALK PHOS U/L 81 93   EGFR ml/min/1 73sq m 99 91   , HgBA1C:   Results from last 7 days   Lab Units 11/29/22  0628   HEMOGLOBIN A1C % 8 2*   , Lipid Profile:   Results from last 7 days   Lab Units 11/29/22 0628   HDL mg/dL 45   LDL CALC mg/dL 104*   TRIGLYCERIDES mg/dL 108     Imaging Studies: I have personally reviewed pertinent reports  and I have personally reviewed pertinent films in PACS CTA H/N wwo contrast 11/28/22   EKG, Pathology, and Other Studies: I have personally reviewed pertinent reports    EKG 11/29/22  VTE Prophylaxis: Sequential compression device (Venodyne)     Code Status: Level 1 - Full Code

## 2022-11-29 NOTE — H&P
[address]  H&P- Jose Luis Nails  1970, 45 y o  male MRN: [id number]  Unit/Bed#: ED 27 Encounter: [id number]  Primary Care Provider: Hamilton Hancock MD   Date and time admitted to hospital: 11/28/2022  9:35 PM    * Dizziness  Assessment & Plan  -reports acute onset dizziness and “unsteadiness” on his feet earlier this afternoon  -also reports that his systolic blood pressure was in the low 200s  -CTA H&N in ER: No acute intracranial abnormality  Mild chronic microangiopathic changes  No evidence of hemodynamically significant stenosis or occlusion within the major vessels of the [place]  No aneurysm  No significant stenosis in the neck   -given meclizine in the ER with reportedly minimal improvement  -GCS 15   -no obvious focal deficit on my exam  Plan  > admit to medicine on telemetry CVA pathway  Permissive hypertension up to systolic of 854 for 24 hours  Order MRI brain as well as echocardiogram   Start aspirin and atorvastatin and check lipid panel as well as A1c    > consult Neurology  > order orthostatics  > daily labs    Elevated blood pressure reading  Assessment & Plan  -systolic blood pressure in the low 200s in the ER  -in the setting of dizziness and sensation of disequilibrium  -history of hypertension but reportedly not on PTA medications  Plan  > as we currently have concern for possible central cause of dizziness with onset earlier in the afternoon,  we will allow for permissive hypertension for 24 hours  Type 2 diabetes mellitus ([facility] [address] )  Assessment & Plan  Lab Results   Component Value Date    HGBA1C 9 4 (H) 05/07/2022       No results for input(s): POCGLU in the last 72 hours  Blood Sugar Average: Last 72 hrs:     Sliding scale insulin while inpatient  Check A1c  Appears A1c was 9 4 in May and patient may require insulin on discharge             VTE Prophylaxis: Heparin  / sequential compression device   Code Status: Level 1 - Full Code Anticipated Length of Stay:  Patient will be admitted on an Observation basis with an anticipated length of stay of  < 2 midnights  Justification for Hospital Stay: Please see detailed plans noted above  Chief Complaint:     Dizziness  History of Present Illness: Gisselle Taylor  is a 45 y o  male who has past medical history significant for hypertension, diabetes mellitus presented to [facility] ER on the a m  Hours of 11/29 with dizziness described as a “unsteadiness on his feet” that began earlier that afternoon as well as elevated systolic blood pressure readings in the low 200s  Patient reports that he was in his usual state of health until this afternoon when started to feel very dizzy and felt like he was going to fall to 1 side while walking  Patient denies ever having these symptoms for  Patient denies taking any medications at home which improved the symptoms  Patient reports that trying to ambulate made his symptoms worse  Patient denies any recent upper respiratory symptoms or any history of similar symptoms  Patient additionally reports that he does have a history of high blood pressure but that his blood pressure is normally in the 140s or 150s and that he is not currently on any medications for this  Patient denies any history of strokes or cardiovascular disease in the past   Patient does report that he has a history of diabetes but denies taking any insulin  Patient denies any fevers or chills  Patient denies any headache        Review of Systems:    Constitutional:  Denies fever or chills   Eyes:  Denies change in visual acuity   HENT:  Denies nasal congestion or sore throat   Respiratory:  Denies cough or shortness of breath   Cardiovascular:  Denies chest pain or edema   GI:  Denies abdominal pain or bloody stools  :  Denies dysuria   Musculoskeletal:  Denies back pain or joint pain   Integument:  Denies rash   Neurologic:  Positive for dizziness  Endocrine: Denies polyuria or polydipsia   Lymphatic:  Denies swollen glands   Psychiatric:  Denies depression or anxiety     Past Medical and Surgical History:   Past Medical History:   Diagnosis Date   • Diabetes mellitus ([address] )    • Hernia of abdominal cavity    • Hypertension    • Ulcer of esophagus      Past Surgical History:   Procedure Laterality Date   • CHOLECYSTECTOMY     • VASECTOMY         Meds/Allergies:  (Not in a hospital admission)      Allergies: No Known Allergies    History:  Marital Status: /Civil Union     Substance Use History:   Social History     Substance and Sexual Activity   Alcohol Use Yes    Comment: Yearly or less, 1-2 drinks  Social History     Tobacco Use   Smoking Status Never   Smokeless Tobacco Never     Social History     Substance and Sexual Activity   Drug Use Never       Family History:  Family History   Problem Relation Age of Onset   • Alzheimer's disease Mother    • Hypertension Mother         When younger     • Emphysema Mother         When younger   • Diabetes Maternal Grandmother    • Hypertension Maternal Grandmother    • Coronary artery disease Maternal Grandmother    • Heart attack Maternal Grandmother    • Diabetes Paternal Aunt    • No Known Problems Father         Passed from Trauma   • Crohn's disease Brother    • Diabetes Brother    • Hypertension Brother    • Cancer Brother         throat       Physical Exam:     Vitals:   Blood Pressure: (!) 201/106 (11/29/22 0206)  Pulse: 98 (11/29/22 0206)  Temperature: 98 2 °F (36 8 °C) (11/28/22 2130)  Temp Source: Oral (11/28/22 2130)  Respirations: 20 (11/29/22 0206)  Height: 5' 9" (175 3 cm) (11/29/22 0206)  Weight - Scale: 103 kg (226 lb 3 1 oz) (11/28/22 2130)  SpO2: 98 % (11/29/22 0206)    Constitutional:  A&O x4 Non-toxic appearance  Eyes:  EOMI, No scleral icterus   HENT:   oropharynx moist, external ears normal, external nose normal   Respiratory:  No respiratory distress, no wheezing   Cardiovascular:  Normal rate, no murmurs   GI:  Soft, nondistended, no guarding   :  No costovertebral angle tenderness   Musculoskeletal:  no tenderness, no deformities  Back- no tenderness  Integument:  no jaundice, no rash   Neurologic:  Alert &awake, communicative, CN 2-12 normal,  no focal deficits noted   Psychiatric:  Speech and behavior appropriate       Lab Results: I have personally reviewed pertinent reports  Results from last 7 days   Lab Units 11/28/22  2201   WBC Thousand/uL 14 23*   HEMOGLOBIN g/dL 17 9*   HEMATOCRIT % 53 5*   PLATELETS Thousands/uL 262   NEUTROS PCT % 77*   LYMPHS PCT % 16   MONOS PCT % 5   EOS PCT % 0     Results from last 7 days   Lab Units 11/28/22  2201   POTASSIUM mmol/L 4 2   CHLORIDE mmol/L 96   CO2 mmol/L 30   BUN mg/dL 16   CREATININE mg/dL 0 94   CALCIUM mg/dL 9 3   ALK PHOS U/L 93   ALT U/L 37             Imaging: I have personally reviewed pertinent reports  CTA head and neck with and without contrast    Result Date: 11/29/2022  Narrative: CTA NECK AND BRAIN WITH AND WITHOUT CONTRAST INDICATION: Stroke, hemorrhagic Vertigo, central Sudden vertigo, nausea vomiting, and HTN, r/out hypertensive bleed vs posterior stroke COMPARISON:   None  TECHNIQUE:  Routine CT imaging of the Brain without contrast   Post contrast imaging was performed after administration of iodinated contrast through the neck and brain  Post contrast axial 0 625 mm images timed to opacify the arterial system  3D rendering was performed on an independent workstation  MIP reconstructions performed  Coronal reconstructions were performed of the noncontrast portion of the brain  Radiation dose length product (DLP) for this visit:  76 310 744 mGy-cm   This examination, like all CT scans performed in the [facility], was performed utilizing techniques to minimize radiation dose exposure, including the use of iterative reconstruction and automated exposure control     IV Contrast:  85 mL of iohexol (OMNIPAQUE) IMAGE QUALITY:   Diagnostic FINDINGS: NONCONTRAST BRAIN PARENCHYMA: Decreased attenuation is noted in periventricular and subcortical white matter demonstrating an appearance that is statistically most likely to represent mild microangiopathic change  No CT signs of acute infarction  No intracranial mass, mass effect or midline shift  No acute parenchymal hemorrhage  VENTRICLES AND EXTRA-AXIAL SPACES:  Normal for the patient's age  VISUALIZED ORBITS AND PARANASAL SINUSES:  Unremarkable  CERVICAL VASCULATURE AORTIC ARCH AND GREAT VESSELS:  Normal aortic arch and great vessel origins  Normal visualized subclavian vessels  Bovine configuration noted  RIGHT VERTEBRAL ARTERY CERVICAL SEGMENT:  Normal origin  The vessel is normal in caliber throughout the neck  LEFT VERTEBRAL ARTERY CERVICAL SEGMENT:  Normal origin  The vessel is normal in caliber throughout the neck  RIGHT EXTRACRANIAL CAROTID SEGMENT:  Normal caliber common carotid artery  Normal bifurcation and cervical internal carotid artery  No stenosis or dissection  LEFT EXTRACRANIAL CAROTID SEGMENT:  Mild atherosclerotic disease of the distal common carotid artery and proximal cervical internal carotid artery without significant stenosis compared to the more distal ICA  Less than 50% stenosis  NASCET criteria was used to determine the degree of internal carotid artery diameter stenosis  INTRACRANIAL VASCULATURE INTERNAL CAROTID ARTERIES:  Normal enhancement of the intracranial portions of the internal carotid arteries  Normal ophthalmic artery origins  Normal ICA terminus  ANTERIOR CIRCULATION:  Symmetric A1 segments and anterior cerebral arteries with normal enhancement  Dominant right A2 segment Normal anterior communicating artery  MIDDLE CEREBRAL ARTERY CIRCULATION:  M1 segment and middle cerebral artery branches demonstrate normal enhancement bilaterally  DISTAL VERTEBRAL ARTERIES:  Normal distal vertebral arteries, dominant on the left    Posterior inferior cerebellar artery origins are normal  Normal vertebral basilar junction  BASILAR ARTERY:  Basilar artery is normal in caliber  Normal superior cerebellar arteries  POSTERIOR CEREBRAL ARTERIES: There is fetal origin of the right posterior cerebral artery  The left posterior cerebral artery arises from the basilar tip  Both demonstrate no focal stenosis  Normal posterior communicating arteries  VENOUS STRUCTURES:  Patent  NON VASCULAR ANATOMY BONY STRUCTURES:  No acute osseous abnormality  SOFT TISSUES OF THE NECK:  Unremarkable  THORACIC INLET:  Normal      Impression: No acute intracranial abnormality  Mild chronic microangiopathic changes  No evidence of hemodynamically significant stenosis or occlusion within the major vessels of the [place]  No aneurysm  No significant stenosis in the neck  Workstation performed: [id number]       Total time for visit, including counseling/coordination of care: 45 minutes  Greater than 50% of this total time spent on direct patient counseling and coorination of care  Epic Records Reviewed as well as Records in Care Everywhere    ** Please Note: Dragon 360 Dictation voice to text software was used in the creation of this document   **

## 2022-11-29 NOTE — ASSESSMENT & PLAN NOTE
-reports acute onset dizziness and “unsteadiness” on his feet earlier this afternoon  -also reports that his systolic blood pressure was in the low 200s  -CTA H&N in ER: No acute intracranial abnormality  Mild chronic microangiopathic changes  No evidence of hemodynamically significant stenosis or occlusion within the major vessels of the [place] of [place]  No aneurysm  No significant stenosis in the neck   -given meclizine in the ER with reportedly minimal improvement  -GCS 15   -no obvious focal deficit on my exam  Plan  > admit to medicine on telemetry CVA pathway  Permissive hypertension up to systolic of 277 for 24 hours    Order MRI brain as well as echocardiogram   Start aspirin and atorvastatin and check lipid panel as well as A1c    > consult Neurology  > order orthostatics  > daily labs

## 2022-11-29 NOTE — ED NOTES
RN given verbal by Neurology that patient plan of care is MRI outpatient and manage BP prior to discharge therefore the bed will no longer be needed  PACS notified       Katheryn Nicholas RN  11/29/22 8423

## 2022-11-29 NOTE — PHYSICAL THERAPY NOTE
Physical Therapy Cancellation Note  Pt screened for PT services  Pt admitted with hypertension, r/o cva, dizziness  Pt has been ambulating to bathroom by himself  Confirmed by nursing, observed by OT and appeared steady  Pt does not need skilled PT services   Pt can return home,     Mel Sahu, PT

## 2022-11-29 NOTE — QUICK NOTE
Discussed with patient regarding blood pressure, and Neurology plan  Will watch patient overnight, and start patient on lisinopril 10 mg and amlodipine 5 mg  No need for MRI, recommending outpatient by Neurology      Non billable note

## 2022-11-29 NOTE — ED NOTES
Attending Willi Silverman) indicated that he wishes the patient to stay for observation regarding his elevated BP  PACS contacted to re establish bed        Miracle Gloria RN  11/29/22 4557

## 2022-11-29 NOTE — ED ATTENDING ATTESTATION
2022  Ian WOOD DO, saw and evaluated the patient  I have discussed the patient with the resident/non-physician practitioner and agree with the resident's/non-physician practitioner's findings, Plan of Care, and MDM as documented in the resident's/non-physician practitioner's note, except where noted  All available labs and Radiology studies were reviewed  I was present for key portions of any procedure(s) performed by the resident/non-physician practitioner and I was immediately available to provide assistance  At this point I agree with the current assessment done in the Emergency Department  I have conducted an independent evaluation of this patient a history and physical is as follows:    Alexei WOOD DO, saw and evaluated the patient  All available labs and X-rays were reviewed  I discussed the patient with the resident / non-physician and agree with the resident's / non-physician practitioner's findings and plan as documented in the resident's / non-physician practicitioner's note, except where noted  At this point, I agree with the current assessment done in the ED      NAME: Leatha hZong  AGE: 45 y o  SEX: male  : 1970   MRN: [id number]  ENCOUNTER: [id number]    DATE: 2022  TIME: 4:12 AM      History of Present Illness   Leatha Zhong  is a 45 y o  male who presents with High Blood Pressure (Pt reports high bp at home, hx of htn but does not take medications at home for bp  Pt reports vomiting 6 times  Pt actively vomiting in triage  )    has a past medical history of Diabetes mellitus ([address] ), Hernia of abdominal cavity, Hypertension, and Ulcer of esophagus        Past Medical History     Past Medical History:   Diagnosis Date   • Diabetes mellitus ([address] )    • Hernia of abdominal cavity    • Hypertension    • Ulcer of esophagus        Past Surgical History     Past Surgical History:   Procedure Laterality Date   • CHOLECYSTECTOMY     • VASECTOMY Social History     Social History     Substance and Sexual Activity   Alcohol Use Yes    Comment: Yearly or less, 1-2 drinks  Social History     Substance and Sexual Activity   Drug Use Never     Social History     Tobacco Use   Smoking Status Never   Smokeless Tobacco Never       Family History     Family History   Problem Relation Age of Onset   • Alzheimer's disease Mother    • Hypertension Mother         When younger  • Emphysema Mother         When younger   • Diabetes Maternal Grandmother    • Hypertension Maternal Grandmother    • Coronary artery disease Maternal Grandmother    • Heart attack Maternal Grandmother    • Diabetes Paternal Aunt    • No Known Problems Father         Passed from Trauma   • Crohn's disease Brother    • Diabetes Brother    • Hypertension Brother    • Cancer Brother         throat       Medications Prior to Admission     Prior to Admission medications    Medication Sig Start Date End Date Taking? Authorizing Provider   Empagliflozin (Jardiance) 25 MG TABS Take 1 tablet (25 mg total) by mouth every morning 6/29/22 12/26/22  Elpidio Kelsey MD   gabapentin (NEURONTIN) 600 MG tablet TAKE 2 TABLETS(1200 MG) BY MOUTH DAILY AT BEDTIME 11/11/22   Elpidio Kelsey MD   glipiZIDE (GLUCOTROL) 10 mg tablet TAKE 1 TABLET BY MOUTH 2 TIMES A DAY BEFORE MEALS  6/23/22   Elpidio Kelsey MD   metFORMIN (GLUCOPHAGE) 1000 MG tablet TAKE 1 TABLET(1000 MG) BY MOUTH TWICE DAILY WITH MEALS 7/27/22   Bri Whitley MD   Semaglutide 7 MG TABS Take 7 mg by mouth daily    Historical Provider, MD   tadalafil (CIALIS) 20 MG tablet Take 1 PO as needed about 1 hour before sex   12/29/21   Elpidio Kelsey MD   Testosterone 12 5 MG/ACT (1%) GEL Place 2 actuation on the skin in the morning 5/11/22   Elpidio Kelsey MD       Allergies   No Known Allergies    Objective     Vitals:    11/28/22 2357 11/29/22 0059 11/29/22 0205 11/29/22 0206   BP: (!) 209/104 (!) 193/99 (!) 201/106 (!) 201/106   BP Location:  Right arm Left arm   Pulse: 89 98 98 98   Resp: 18 20 18 20   Temp:       TempSrc:       SpO2: 94% 94% 95% 98%   Weight:       Height:    5' 9" (1 753 m)     Body mass index is 33 4 kg/m²      Intake/Output Summary (Last 24 hours) at 11/29/2022 0412  Last data filed at 11/29/2022 0100  Gross per 24 hour   Intake 1000 ml   Output --   Net 1000 ml     Invasive Devices     Peripheral Intravenous Line  Duration           Peripheral IV 11/28/22 Right Antecubital <1 day                Physical Exam  General: awake, alert, no acute distress  Head: normocephalic, atraumatic  Eyes: no scleral icterus  Ears: external ears normal, hearing grossly intact  Nose: external exam grossly normal  Neck: symmetric, No JVD noted, trachea midline  Pulmonary: no respiratory distress, no tachypnea noted  Cardiovascular: appears well perfused  Abdomen: no distention noted  Musculoskeletal: no deformities noted, tone normal  Neuro: grossly non-focal  Psych: mood and affect appropriate    Lab Results:    Labs Reviewed   CBC AND DIFFERENTIAL - Abnormal       Result Value Ref Range Status    WBC 14 23 (*) 4 31 - 10 16 Thousand/uL Final    RBC 6 61 (*) 3 88 - 5 62 Million/uL Final    Hemoglobin 17 9 (*) 12 0 - 17 0 g/dL Final    Hematocrit 53 5 (*) 36 5 - 49 3 % Final    MCV 81 (*) 82 - 98 fL Final    MCH 27 1  26 8 - 34 3 pg Final    MCHC 33 5  31 4 - 37 4 g/dL Final    RDW 12 2  11 6 - 15 1 % Final    MPV 9 7  8 9 - 12 7 fL Final    Platelets 036  573 - 390 Thousands/uL Final    nRBC 0  /100 WBCs Final    Neutrophils Relative 77 (*) 43 - 75 % Final    Immat GRANS % 1  0 - 2 % Final    Lymphocytes Relative 16  14 - 44 % Final    Monocytes Relative 5  4 - 12 % Final    Eosinophils Relative 0  0 - 6 % Final    Basophils Relative 1  0 - 1 % Final    Neutrophils Absolute 10 85 (*) 1 85 - 7 62 Thousands/µL Final    Immature Grans Absolute 0 20  0 00 - 0 20 Thousand/uL Final    Lymphocytes Absolute 2 30  0 60 - 4 47 Thousands/µL Final    Monocytes Absolute 0 73 0 17 - 1 22 Thousand/µL Final    Eosinophils Absolute 0 05  0 00 - 0 61 Thousand/µL Final    Basophils Absolute 0 10  0 00 - 0 10 Thousands/µL Final   COMPREHENSIVE METABOLIC PANEL - Abnormal    Sodium 135  135 - 147 mmol/L Final    Potassium 4 2  3 5 - 5 3 mmol/L Final    Chloride 96  96 - 108 mmol/L Final    CO2 30  21 - 32 mmol/L Final    ANION GAP 9  4 - 13 mmol/L Final    BUN 16  5 - 25 mg/dL Final    Creatinine 0 94  0 60 - 1 30 mg/dL Final    Comment: Standardized to IDMS reference method    Glucose 251 (*) 65 - 140 mg/dL Final    Comment: If the patient is fasting, the ADA then defines impaired fasting glucose as > 100 mg/dL and diabetes as > or equal to 123 mg/dL  Specimen collection should occur prior to Sulfasalazine administration due to the potential for falsely depressed results  Specimen collection should occur prior to Sulfapyridine administration due to the potential for falsely elevated results  Calcium 9 3  8 3 - 10 1 mg/dL Final    AST     Final    Comment: Specimen collection should occur prior to Sulfasalazine administration due to the potential for falsely depressed results  ALT 37  12 - 78 U/L Final    Comment: Specimen collection should occur prior to Sulfasalazine administration due to the potential for falsely depressed results  Alkaline Phosphatase 93  46 - 116 U/L Final    Total Protein 8 2  6 4 - 8 4 g/dL Final    Albumin 4 2  3 5 - 5 0 g/dL Final    Total Bilirubin 0 48  0 20 - 1 00 mg/dL Final    Comment: Use of this assay is not recommended for patients undergoing treatment with eltrombopag due to the potential for falsely elevated results      eGFR 91  ml/min/1 73sq m Final    Narrative:     [place] guidelines for Chronic Kidney Disease (CKD):   •  Stage 1 with normal or high GFR (GFR > 90 mL/min/1 73 square meters)  •  Stage 2 Mild CKD (GFR = 60-89 mL/min/1 73 square meters)  •  Stage 3A Moderate CKD (GFR = 45-59 mL/min/1 73 square meters)  • Stage 3B Moderate CKD (GFR = 30-44 mL/min/1 73 square meters)  •  Stage 4 Severe CKD (GFR = 15-29 mL/min/1 73 square meters)  •  Stage 5 End Stage CKD (GFR <15 mL/min/1 73 square meters)  Note: GFR calculation is accurate only with a steady state creatinine   URINE MICROSCOPIC - Abnormal    RBC, UA 0-1 (*) None Seen, 2-4 /hpf Final    WBC, UA 0-1 (*) None Seen, 2-4, 5-60 /hpf Final    Epithelial Cells None Seen  None Seen, Occasional /hpf Final    Bacteria, UA Occasional  None Seen, Occasional /hpf Final   URINE MACROSCOPIC, POC - Abnormal    Color, UA Yellow   Final    Clarity, UA Clear   Final    pH, UA 7 0  4 5 - 8 0 Final    Leukocytes, UA   (*) Negative Final    Value: Elevated glucose may cause decreased leukocyte values  See urine microscopic for [facility] result/    Nitrite, UA Negative  Negative Final    Protein,  (2+) (*) Negative mg/dl Final    Glucose, UA >=1000 (1%) (*) Negative mg/dl Final    Ketones, UA 40 (2+) (*) Negative mg/dl Final    Urobilinogen, UA 0 2  0 2, 1 0 E U /dl E U /dl Final    Bilirubin, UA Negative  Negative Final    Occult Blood, UA Negative  Negative Final    Specific [place], UA 1 015  1 003 - 1 030 Final    Narrative:     CLINITEK RESULT   NOVEL CORONAVIRUS (COVID-19), PCR [facility] - Normal    SARS-CoV-2 Negative  Negative Final    Narrative:     FOR PEDIATRIC PATIENTS - copy/paste COVID Guidelines URL to browser: [email or web address]  [employer]    SARS-CoV-2 assay is a Nucleic Acid Amplification assay intended for the  qualitative detection of nucleic acid from SARS-CoV-2 in nasopharyngeal  swabs  Results are for the presumptive identification of SARS-CoV-2 RNA  Positive results are indicative of infection with SARS-CoV-2, the virus  causing COVID-19, but do not rule out bacterial infection or co-infection  with other viruses   Laboratories within the [place] and its  territories are required to report all positive results to the appropriate  public health authorities  Negative results do not preclude SARS-CoV-2  infection and should not be used as the sole basis for treatment or other  patient management decisions  Negative results must be combined with  clinical observations, patient history, and epidemiological information  This test has not been FDA cleared or approved  This test has been authorized by FDA under an Emergency Use Authorization  (EUA)  This test is only authorized for the duration of time the  declaration that circumstances exist justifying the authorization of the  emergency use of an in vitro diagnostic tests for detection of SARS-CoV-2  virus and/or diagnosis of COVID-19 infection under section 564(b)(1) of  the Act, 21 U  S C  546BBU-4(V)(5), unless the authorization is terminated  or revoked sooner  The test has been validated but independent review by FDA  and CLIA is pending  Test performed using [employer]: This RT-PCR assay targets N2,  a region unique to SARS-CoV-2  A conserved region in the E-gene was chosen  for pan-Sarbecovirus detection which includes SARS-CoV-2  According to CMS-2020-01-R, this platform meets the definition of high-throughput technology  HS TROPONIN I 0HR - Normal    hs TnI 0hr 3  "Refer to ACS Flowchart"- see link ng/L Final    Comment:                                              Initial (time 0) result  If >=50 ng/L, Myocardial injury suggested ;  Type of myocardial injury and treatment strategy  to be determined  If 5-49 ng/L, a delta result at 2 hours and or 4 hours will be needed to further evaluate  If <4 ng/L, and chest pain has been >3 hours since onset, patient may qualify for discharge based on the HEART score in the ED  If <5 ng/L and <3hours since onset of chest pain, a delta result at 2 hours will be needed to further evaluate  HS Troponin 99th Percentile URL of a Health Population=12 ng/L with a 95% Confidence Interval of 8-18 ng/L      Second Troponin (time 2 hours)  If calculated delta >= 20 ng/L,  Myocardial injury suggested ; Type of myocardial injury and treatment strategy to be determined  If 5-49 ng/L and the calculated delta is 5-19 ng/L, consult medical service for evaluation  Continue evaluation for ischemia on ecg and other possible etiology and repeat hs troponin at 4 hours  If delta is <5 ng/L at 2 hours, consider discharge based on risk stratification via the HEART score (if in ED), or GAUTAM risk score in IP/Observation  HS Troponin 99th Percentile URL of a Health Population=12 ng/L with a 95% Confidence Interval of 8-18 ng/L    HS TROPONIN I 2HR - Normal    hs TnI 2hr 8  "Refer to ACS Flowchart"- see link ng/L Final    Comment:                                              Initial (time 0) result  If >=50 ng/L, Myocardial injury suggested ;  Type of myocardial injury and treatment strategy  to be determined  If 5-49 ng/L, a delta result at 2 hours and or 4 hours will be needed to further evaluate  If <4 ng/L, and chest pain has been >3 hours since onset, patient may qualify for discharge based on the HEART score in the ED  If <5 ng/L and <3hours since onset of chest pain, a delta result at 2 hours will be needed to further evaluate  HS Troponin 99th Percentile URL of a Health Population=12 ng/L with a 95% Confidence Interval of 8-18 ng/L  Second Troponin (time 2 hours)  If calculated delta >= 20 ng/L,  Myocardial injury suggested ; Type of myocardial injury and treatment strategy to be determined  If 5-49 ng/L and the calculated delta is 5-19 ng/L, consult medical service for evaluation  Continue evaluation for ischemia on ecg and other possible etiology and repeat hs troponin at 4 hours  If delta is <5 ng/L at 2 hours, consider discharge based on risk stratification via the HEART score (if in ED), or GAUTAM risk score in IP/Observation      HS Troponin 99th Percentile URL of a Health Population=12 ng/L with a 95% Confidence Interval of 8-18 ng/L  Delta 2hr hsTnI 5  <20 ng/L Final   HS TROPONIN I 4HR - Normal    hs TnI 4hr 12  "Refer to ACS Flowchart"- see link ng/L Final    Comment:                                              Initial (time 0) result  If >=50 ng/L, Myocardial injury suggested ;  Type of myocardial injury and treatment strategy  to be determined  If 5-49 ng/L, a delta result at 2 hours and or 4 hours will be needed to further evaluate  If <4 ng/L, and chest pain has been >3 hours since onset, patient may qualify for discharge based on the HEART score in the ED  If <5 ng/L and <3hours since onset of chest pain, a delta result at 2 hours will be needed to further evaluate  HS Troponin 99th Percentile URL of a Health Population=12 ng/L with a 95% Confidence Interval of 8-18 ng/L  Second Troponin (time 2 hours)  If calculated delta >= 20 ng/L,  Myocardial injury suggested ; Type of myocardial injury and treatment strategy to be determined  If 5-49 ng/L and the calculated delta is 5-19 ng/L, consult medical service for evaluation  Continue evaluation for ischemia on ecg and other possible etiology and repeat hs troponin at 4 hours  If delta is <5 ng/L at 2 hours, consider discharge based on risk stratification via the HEART score (if in ED), or GAUTAM risk score in IP/Observation  HS Troponin 99th Percentile URL of a Health Population=12 ng/L with a 95% Confidence Interval of 8-18 ng/L  Delta 4hr hsTnI 9  <20 ng/L Final   RAPID DRUG SCREEN, URINE   LIPID PANEL WITH DIRECT LDL REFLEX   HEMOGLOBIN A1C   COMPREHENSIVE METABOLIC PANEL   MAGNESIUM   CBC AND DIFFERENTIAL   PROTIME-INR   APTT         Imaging:   CTA head and neck with and without contrast   Final Result      No acute intracranial abnormality  Mild chronic microangiopathic changes  No evidence of hemodynamically significant stenosis or occlusion within the major vessels of the [place]  No aneurysm  No significant stenosis in the neck  Workstation performed: [id number]         MRI Inpatient Order    (Results Pending)         Medications given in Emergency Department     Medication Administration - last 24 hours from 11/28/2022 0412 to 11/29/2022 0412       Date/Time Order Dose Route Action Action by     11/29/2022 0100 EST sodium chloride 0 9 % bolus 1,000 mL 0 mL Intravenous Stopped Chaim Kathleen RN     11/28/2022 2157 EST sodium chloride 0 9 % bolus 1,000 mL 1,000 mL Intravenous New Bag Rhiannon Cary RN     11/28/2022 2159 EST ondansetron (ZOFRAN) injection 4 mg 4 mg Intravenous Given Rhiannon Cary RN     11/28/2022 2159 EST meclizine (ANTIVERT) tablet 12 5 mg 12 5 mg Oral Given Rhiannon Torres RN     11/28/2022 2245 EST iohexol (OMNIPAQUE) 350 MG/ML injection (SINGLE-DOSE) 85 mL 85 mL Intravenous Given Reena Mena     11/29/2022 0102 EST sodium chloride 0 9 % bolus 1,000 mL 1,000 mL Intravenous New Bag Chaim Kathleen RN          Assessment and Plan  Elevated blood pressure reading  Dizziness that he describes as off balance  Nausea and vomiting    Patient does not have a history of high blood pressure  Patient's blood pressure is moderately elevated tonight  Patient started earlier with symptoms of dizziness that he describes as feeling off balance  States that it is constant but worse when he sits up and moves around  No prior history of vertigo or hypertension  No new medications or recent head injuries  Symptoms are concerning for hypertensive crisis  Patient is neurologically intact now without deficit but given his symptoms we will obtain a CTA, blood work, treat with IV fluids, Zofran and meclizine and re-evaluate  Patient feeling better, blood pressure is slightly improving with treatment but patient is still having some symptoms while lying down  Given the constant nature of this I am or concern for possible posterior vascular problem    Will admit to Internal Medicine for further evaluation with MRI and MRA as needed  Will allow for permissive hypertension at this time  Active Problems:    * No active hospital problems  *      Final Diagnosis:  1  Hypertension    2  Dizziness    3  Nausea & vomiting    4   Low testosterone in male        ED Course         Critical Care Time  Procedures

## 2022-11-29 NOTE — ASSESSMENT & PLAN NOTE
45 y o  male with diabetes with polyneuropathy, obesity, history of elevated BP readings (however not on any antihypertensive medications), and low testosterone on supplementation, who presented to [facility] on 11/28/22 following the development of sudden onset dizziness (described as unsteadiness like being on a boat or being intoxicated), nausea, recurrent vomiting, and elevated BP which was 226/135 on arrival  The patient's symptoms have resolved and with the addition of Amlodipine, patient's BP now much improved with recent /98  Case reviewed with Dr Gwendolyn Trujillo who also spoke with the patient  Suspected etiology to be hypertensive urgency vs small stroke/TIA  As patient's symptoms have resolved, CTA negative, and Echo normal, will hold off on MRI brain as it will not   - Continue Aspirin 81mg and Atorvastatin 20mg    - Optimize stroke risk factors  - Check BP several times a ay at home after discharge and record and then bring to PCP for continued evaluation of antihypertensive regimen   - Goal of euglycemia  - STAT CT head for any acute change in neuro exam  - Medical management and supportive care per primary team  Correction of any metabolic or infectious disturbances    - No further inpatient neurologic recommendations

## 2022-11-29 NOTE — ED NOTES
Cory texted provider regarding head pain, gabapentin dose and last BP post med administration       Kinga Golden RN  11/29/22 4514

## 2022-11-30 VITALS
OXYGEN SATURATION: 96 % | HEIGHT: 69 IN | WEIGHT: 221.34 LBS | TEMPERATURE: 97 F | SYSTOLIC BLOOD PRESSURE: 153 MMHG | DIASTOLIC BLOOD PRESSURE: 98 MMHG | HEART RATE: 88 BPM | BODY MASS INDEX: 32.78 KG/M2 | RESPIRATION RATE: 18 BRPM

## 2022-11-30 PROBLEM — I16.0 HYPERTENSIVE URGENCY: Status: ACTIVE | Noted: 2020-03-18

## 2022-11-30 LAB
ANION GAP SERPL CALCULATED.3IONS-SCNC: 11 MMOL/L (ref 4–13)
BASOPHILS # BLD AUTO: 0.1 THOUSANDS/ÂΜL (ref 0–0.1)
BASOPHILS NFR BLD AUTO: 1 % (ref 0–1)
BUN SERPL-MCNC: 18 MG/DL (ref 5–25)
CALCIUM SERPL-MCNC: 8.7 MG/DL (ref 8.3–10.1)
CHLORIDE SERPL-SCNC: 101 MMOL/L (ref 96–108)
CO2 SERPL-SCNC: 24 MMOL/L (ref 21–32)
CREAT SERPL-MCNC: 0.82 MG/DL (ref 0.6–1.3)
EOSINOPHIL # BLD AUTO: 0.16 THOUSAND/ÂΜL (ref 0–0.61)
EOSINOPHIL NFR BLD AUTO: 1 % (ref 0–6)
ERYTHROCYTE [DISTWIDTH] IN BLOOD BY AUTOMATED COUNT: 12.8 % (ref 11.6–15.1)
GFR SERPL CREATININE-BSD FRML MDRD: 100 ML/MIN/1.73SQ M
GLUCOSE SERPL-MCNC: 126 MG/DL (ref 65–140)
GLUCOSE SERPL-MCNC: 151 MG/DL (ref 65–140)
GLUCOSE SERPL-MCNC: 151 MG/DL (ref 65–140)
HCT VFR BLD AUTO: 49.5 % (ref 36.5–49.3)
HGB BLD-MCNC: 16.5 G/DL (ref 12–17)
IMM GRANULOCYTES # BLD AUTO: 0.11 THOUSAND/UL (ref 0–0.2)
IMM GRANULOCYTES NFR BLD AUTO: 1 % (ref 0–2)
LYMPHOCYTES # BLD AUTO: 3.08 THOUSANDS/ÂΜL (ref 0.6–4.47)
LYMPHOCYTES NFR BLD AUTO: 28 % (ref 14–44)
MCH RBC QN AUTO: 27.1 PG (ref 26.8–34.3)
MCHC RBC AUTO-ENTMCNC: 33.3 G/DL (ref 31.4–37.4)
MCV RBC AUTO: 81 FL (ref 82–98)
MONOCYTES # BLD AUTO: 0.81 THOUSAND/ÂΜL (ref 0.17–1.22)
MONOCYTES NFR BLD AUTO: 7 % (ref 4–12)
NEUTROPHILS # BLD AUTO: 6.95 THOUSANDS/ÂΜL (ref 1.85–7.62)
NEUTS SEG NFR BLD AUTO: 62 % (ref 43–75)
NRBC BLD AUTO-RTO: 0 /100 WBCS
PLATELET # BLD AUTO: 249 THOUSANDS/UL (ref 149–390)
PMV BLD AUTO: 9.5 FL (ref 8.9–12.7)
POTASSIUM SERPL-SCNC: 3.6 MMOL/L (ref 3.5–5.3)
RBC # BLD AUTO: 6.08 MILLION/UL (ref 3.88–5.62)
SODIUM SERPL-SCNC: 136 MMOL/L (ref 135–147)
WBC # BLD AUTO: 11.21 THOUSAND/UL (ref 4.31–10.16)

## 2022-11-30 RX ORDER — AMLODIPINE BESYLATE 10 MG/1
10 TABLET ORAL DAILY
Qty: 30 TABLET | Refills: 0 | Status: SHIPPED | OUTPATIENT
Start: 2022-11-30 | End: 2022-12-07 | Stop reason: SDUPTHER

## 2022-11-30 RX ORDER — ATORVASTATIN CALCIUM 20 MG/1
20 TABLET, FILM COATED ORAL EVERY EVENING
Qty: 30 TABLET | Refills: 0 | Status: SHIPPED | OUTPATIENT
Start: 2022-11-30 | End: 2022-12-07 | Stop reason: SDUPTHER

## 2022-11-30 RX ORDER — ASPIRIN 81 MG/1
81 TABLET, CHEWABLE ORAL DAILY
Qty: 30 TABLET | Refills: 0 | Status: SHIPPED | OUTPATIENT
Start: 2022-11-30 | End: 2022-12-30

## 2022-11-30 RX ORDER — LISINOPRIL 20 MG/1
20 TABLET ORAL DAILY
Qty: 30 TABLET | Refills: 0 | Status: SHIPPED | OUTPATIENT
Start: 2022-11-30 | End: 2022-12-30

## 2022-11-30 RX ORDER — LISINOPRIL 20 MG/1
20 TABLET ORAL DAILY
Status: DISCONTINUED | OUTPATIENT
Start: 2022-11-30 | End: 2022-11-30 | Stop reason: HOSPADM

## 2022-11-30 RX ADMIN — AMLODIPINE BESYLATE 10 MG: 10 TABLET ORAL at 09:30

## 2022-11-30 RX ADMIN — ASPIRIN 81 MG CHEWABLE TABLET 81 MG: 81 TABLET CHEWABLE at 09:31

## 2022-11-30 RX ADMIN — LISINOPRIL 20 MG: 20 TABLET ORAL at 09:31

## 2022-11-30 RX ADMIN — BUTALBITAL, ACETAMINOPHEN AND CAFFEINE 1 TABLET: 50; 325; 40 TABLET ORAL at 00:01

## 2022-11-30 RX ADMIN — BUTALBITAL, ACETAMINOPHEN AND CAFFEINE 1 TABLET: 50; 325; 40 TABLET ORAL at 09:34

## 2022-11-30 NOTE — PLAN OF CARE
Problem: Potential for Falls  Goal: Patient will remain free of falls  Description: INTERVENTIONS:  - Educate patient/family on patient safety including physical limitations  - Instruct patient to call for assistance with activity   - Consult OT/PT to assist with strengthening/mobility   - Keep Call bell within reach  - Keep bed low and locked with side rails adjusted as appropriate  - Keep care items and personal belongings within reach  - Initiate and maintain comfort rounds  - Make Fall Risk Sign visible to staff  - Offer Toileting every  Hours, in advance of need  - Initiate/Maintain alarm  - Obtain necessary fall risk management equipment:   - Apply yellow socks and bracelet for high fall risk patients  - Consider moving patient to room near nurses station  Outcome: Progressing     Problem: PAIN - ADULT  Goal: Verbalizes/displays adequate comfort level or baseline comfort level  Description: Interventions:  - Encourage patient to monitor pain and request assistance  - Assess pain using appropriate pain scale  - Administer analgesics based on type and severity of pain and evaluate response  - Implement non-pharmacological measures as appropriate and evaluate response  - Consider cultural and social influences on pain and pain management  - Notify physician/advanced practitioner if interventions unsuccessful or patient reports new pain  Outcome: Progressing     Problem: INFECTION - ADULT  Goal: Absence or prevention of progression during hospitalization  Description: INTERVENTIONS:  - Assess and monitor for signs and symptoms of infection  - Monitor lab/diagnostic results  - Monitor all insertion sites, i e  indwelling lines, tubes, and drains  - Monitor endotracheal if appropriate and nasal secretions for changes in amount and color  - [place] appropriate cooling/warming therapies per order  - Administer medications as ordered  - Instruct and encourage patient and family to use good hand hygiene technique  - Identify and instruct in appropriate isolation precautions for identified infection/condition  Outcome: Progressing  Goal: Absence of fever/infection during neutropenic period  Description: INTERVENTIONS:  - Monitor WBC    Outcome: Progressing     Problem: SAFETY ADULT  Goal: Patient will remain free of falls  Description: INTERVENTIONS:  - Educate patient/family on patient safety including physical limitations  - Instruct patient to call for assistance with activity   - Consult OT/PT to assist with strengthening/mobility   - Keep Call bell within reach  - Keep bed low and locked with side rails adjusted as appropriate  - Keep care items and personal belongings within reach  - Initiate and maintain comfort rounds  - Make Fall Risk Sign visible to staff  - Offer Toileting every  Hours, in advance of need  - Initiate/Maintain alarm  - Obtain necessary fall risk management equipment:   - Apply yellow socks and bracelet for high fall risk patients  - Consider moving patient to room near nurses station  Outcome: Progressing  Goal: Maintain or return to baseline ADL function  Description: INTERVENTIONS:  -  Assess patient's ability to carry out ADLs; assess patient's baseline for ADL function and identify physical deficits which impact ability to perform ADLs (bathing, care of mouth/teeth, toileting, grooming, dressing, etc )  - Assess/evaluate cause of self-care deficits   - Assess range of motion  - Assess patient's mobility; develop plan if impaired  - Assess patient's need for assistive devices and provide as appropriate  - Encourage maximum independence but intervene and supervise when necessary  - Involve family in performance of ADLs  - Assess for home care needs following discharge   - Consider OT consult to assist with ADL evaluation and planning for discharge  - Provide patient education as appropriate  Outcome: Progressing  Goal: Maintains/Returns to pre admission functional level  Description: INTERVENTIONS:  - Perform BMAT or MOVE assessment daily    - Set and communicate daily mobility goal to care team and patient/family/caregiver  - Collaborate with rehabilitation services on mobility goals if consulted  - Perform Range of Motion  times a day  - Reposition patient every  hours  - Dangle patient  times a day  - Stand patient  times a day  - Ambulate patient  times a day  - Out of bed to chair  times a day   - Out of bed for meals times a day  - Out of bed for toileting  - Record patient progress and toleration of activity level   Outcome: Progressing     Problem: DISCHARGE PLANNING  Goal: Discharge to home or other facility with appropriate resources  Description: INTERVENTIONS:  - Identify barriers to discharge w/patient and caregiver  - Arrange for needed discharge resources and transportation as appropriate  - Identify discharge learning needs (meds, wound care, etc )  - Arrange for interpretive services to assist at discharge as needed  - Refer to Case Management Department for coordinating discharge planning if the patient needs post-hospital services based on physician/advanced practitioner order or complex needs related to functional status, cognitive ability, or social support system  Outcome: Progressing     Problem: Knowledge Deficit  Goal: Patient/family/caregiver demonstrates understanding of disease process, treatment plan, medications, and discharge instructions  Description: Complete learning assessment and assess knowledge base    Interventions:  - Provide teaching at level of understanding  - Provide teaching via preferred learning methods  Outcome: Progressing     Problem: CARDIOVASCULAR - ADULT  Goal: Maintains optimal cardiac output and hemodynamic stability  Description: INTERVENTIONS:  - Monitor I/O, vital signs and rhythm  - Monitor for S/S and trends of decreased cardiac output  - Administer and titrate ordered vasoactive medications to optimize hemodynamic stability  - Assess quality of pulses, skin color and temperature  - Assess for signs of decreased coronary artery perfusion  - Instruct patient to report change in severity of symptoms  Outcome: Progressing  Goal: Absence of cardiac dysrhythmias or at baseline rhythm  Description: INTERVENTIONS:  - Continuous cardiac monitoring, vital signs, obtain 12 lead EKG if ordered  - Administer antiarrhythmic and heart rate control medications as ordered  - Monitor electrolytes and administer replacement therapy as ordered  Outcome: Progressing     Problem: DECISION MAKING  Goal: Pt/Family able to effectively weigh alternatives and participate in decision making related to treatment and care  Description: INTERVENTIONS:  - Identify decision maker  - Determine when there are differences among patient's view, family's view, and healthcare provider's view of patient condition and care goals  - Facilitate patient/family articulation of goals for care  - Help patient/family identify pros/cons of alternative solutions  - Provide information as requested by patient/family  - Respect patient/family rights related to privacy and sharing information   - Serve as a liaison between patient, family and health care team  - Initiate consults as appropriate (Ethics Team, Palliative Care, Family Care Conference, etc )  Outcome: Progressing     Problem: Neurological Deficit  Goal: Neurological status is stable or improving  Description: Interventions:  - Monitor and assess patient's level of consciousness, motor function, sensory function, and level of assistance needed for ADLs  - Monitor and report changes from baseline  Collaborate with interdisciplinary team to initiate plan and implement interventions as ordered  - Provide and maintain a safe environment  - Consider seizure precautions  - Consider fall precautions  - Consider aspiration precautions  - Consider bleeding precautions  Outcome: Progressing     Problem:  Activity Intolerance/Impaired Mobility  Goal: Mobility/activity is maintained at optimum level for patient  Description: Interventions:  - Assess and monitor patient  barriers to mobility and need for assistive/adaptive devices  - Assess patient's emotional response to limitations  - Collaborate with interdisciplinary team and initiate plans and interventions as ordered  - Encourage independent activity per ability   - Maintain proper body alignment  - Perform active/passive rom as tolerated/ordered    - Plan activities to conserve energy   - Turn patient as appropriate  Outcome: Progressing

## 2022-11-30 NOTE — DISCHARGE SUMMARY
[facility]  Discharge- Abelardo Ornelas  1970, 45 y o  male MRN: [id number]  Unit/Bed#: E4 -01 Encounter: [id number]  Primary Care Provider: Ava Baez MD   Date and time admitted to hospital: 11/28/2022  9:35 PM    * Dizziness  Assessment & Plan  -reports acute onset dizziness and “unsteadiness” on his feet earlier this afternoon  -also reports that his systolic blood pressure was in the low 200s  -CTA H&N in ER: No acute intracranial abnormality  Mild chronic microangiopathic changes  No evidence of hemodynamically significant stenosis or occlusion within the major vessels of the [place]  No aneurysm  No significant stenosis in the neck   -evaluated by Neurology, did not feel the patient's symptoms was likely due to stroke  Suspected patient's symptoms likely due to hypertensive urgency with history of white coat syndrome  Blood pressure control as below  Discharge home to follow-up with Neurology outpatient, consider brain MRI than  Patient to start aspirin 81 mg and Lipitor 20 mg once daily given risk factors including obesity and type 2 diabetes and hypertension    Hypertensive urgency  Assessment & Plan  Systolic blood pressure greater than 220 on admission  Started patient on Norvasc, lisinopril with blood pressure control  Recommend patient continue Norvasc 10 mg once daily, lisinopril 20 g once daily      Type 2 diabetes mellitus ([address] )  Assessment & Plan  Lab Results   Component Value Date    HGBA1C 8 2 (H) 11/29/2022       Recent Labs     11/29/22  0700 11/29/22  1213 11/29/22  1803 11/29/22  2101   POCGLU 111 160* 137 193*     Blood sugars fairly controlled on inpatient  Continue home p o   Diabetic medications including glipizide, Jardiance, metformin and semaglutide        Discharging Physician / Practitioner: Goldie Coffman MD  PCP: Ava Baez MD  Admission Date:   Admission Orders (From admission, onward)     Ordered        11/29/22 0201  Place in Observation  Once                      Discharge Date: 11/30/22    Medical Problems     Resolved Problems  Date Reviewed: 11/30/2022   None         Consultations During Hospital Stay:  · Neurology    Procedures Performed:   · none    Significant Findings / Test Results:   CTA head and neck with and without contrast    Result Date: 11/29/2022  Impression: No acute intracranial abnormality  Mild chronic microangiopathic changes  No evidence of hemodynamically significant stenosis or occlusion within the major vessels of the [place] of [place]  No aneurysm  No significant stenosis in the neck  Workstation performed: [id number]     XR chest portable    Result Date: 11/29/2022  · Impression: No acute pulmonary pathology  Workstation performed: [id number]   ·     Incidental Findings:   · none     Test Results Pending at Discharge (will require follow up):   · none     Outpatient Tests Requested:  · none    Complications:  none    Reason for Admission: Dizziness    Hospital Course: Jay Holly  is a 45 y o  male patient who originally presented to the hospital on 11/28/2022 due to dizziness  Initially suspect that patient may have had a stroke and placed on stroke pathway  Initial CTA head and neck was negative for any acute processes  Neurology evaluated in person, suspect patient's dizziness was likely due to uncontrolled hypertension  Systolic was noted to be greater than 220 on admission  Did not recommend MRI but can be done as an outpatient as patient's symptoms has mainly resolved  Patient was started on Norvasc, lisinopril with blood pressure improving to 628N systolic  Neurology did recommend the patient continue aspirin 81 mg, Lipitor 20 mg as patient does have risk factors including obesity, type 2 diabetes and hypertension  Recommended follow-up with Neurology outpatient to consider outpatient MRI    Follow closely with PCP as patient's blood pressure medications may need further titration  Please see above list of diagnoses and related plan for additional information  Condition at Discharge: fair     Discharge Day Visit / Exam:     Subjective:  Patient was able to sleep overnight without any difficulty  States that his headache has resolved  Ambulating the room that issues  Tolerating diet  Discussed discharge planning and patient's agreement  Vitals: Blood Pressure: 153/98 (11/30/22 0500)  Pulse: 88 (11/30/22 0500)  Temperature: (!) 97 °F (36 1 °C) (11/30/22 0500)  Temp Source: Temporal (11/30/22 0500)  Respirations: 18 (11/30/22 0500)  Height: 5' 9" (175 3 cm) (11/29/22 2050)  Weight - Scale: 100 kg (221 lb 5 5 oz) (11/29/22 2050)  SpO2: 96 % (11/30/22 0500)  Exam:   Physical Exam  Vitals and nursing note reviewed  Constitutional:       Appearance: Normal appearance  He is obese  HENT:      Head: Normocephalic and atraumatic  Eyes:      General: No scleral icterus  Conjunctiva/sclera: Conjunctivae normal    Cardiovascular:      Rate and Rhythm: Normal rate and regular rhythm  Heart sounds: Normal heart sounds  Pulmonary:      Breath sounds: Normal breath sounds  No wheezing or rhonchi  Abdominal:      General: Bowel sounds are normal  There is no distension  Palpations: Abdomen is soft  Musculoskeletal:         General: No swelling  Right lower leg: No edema  Left lower leg: No edema  Skin:     General: Skin is warm and dry  Neurological:      General: No focal deficit present  Mental Status: He is alert  Mental status is at baseline  Discussion with Family: none    Discharge instructions/Information to patient and family:   See after visit summary for information provided to patient and family  Provisions for Follow-Up Care:  See after visit summary for information related to follow-up care and any pertinent home health orders        Disposition:     Home    Planned Readmission: none     Discharge Statement:  I spent 35 minutes discharging the patient  This time was spent on the day of discharge  I had direct contact with the patient on the day of discharge  Greater than 50% of the total time was spent examining patient, answering all patient questions, arranging and discussing plan of care with patient as well as directly providing post-discharge instructions  Additional time then spent on discharge activities  Discharge Medications:  See after visit summary for reconciled discharge medications provided to patient and family        ** Please Note: This note has been constructed using a voice recognition system **

## 2022-11-30 NOTE — ASSESSMENT & PLAN NOTE
-reports acute onset dizziness and “unsteadiness” on his feet earlier this afternoon  -also reports that his systolic blood pressure was in the low 200s  -CTA H&N in ER: No acute intracranial abnormality  Mild chronic microangiopathic changes  No evidence of hemodynamically significant stenosis or occlusion within the major vessels of the [place]  No aneurysm   No significant stenosis in the neck   -evaluated by Neurology, did not feel the patient's symptoms was likely due to stroke  Suspected patient's symptoms likely due to hypertensive urgency with history of white coat syndrome  Blood pressure control as below  Discharge home to follow-up with Neurology outpatient, consider brain MRI than  Patient to start aspirin 81 mg and Lipitor 20 mg once daily given risk factors including obesity and type 2 diabetes and hypertension

## 2022-11-30 NOTE — PROGRESS NOTES
Progress Note - Neurology   Amelia Sosa  45 y o  male [id number]  Unit/Bed#: [facility] 4 /E4 -*    Assessment/Plan:  * Dizziness  Assessment & Plan    45 y o  male with diabetes with polyneuropathy, obesity, history of elevated BP readings (however not on any antihypertensive medications), and low testosterone on supplementation, who presented to [facility] on 11/28/22 following the development of sudden onset dizziness (described as unsteadiness like being on a boat or being intoxicated), nausea, recurrent vomiting, and elevated BP which was 226/135 on arrival  The patient's symptoms have resolved and with the addition of Amlodipine, patient's BP now much improved with recent /98  Case reviewed with Dr Adán Wade who also spoke with the patient  Suspected etiology to be hypertensive urgency vs small stroke/TIA  As patient's symptoms have resolved, CTA negative, and Echo normal, will hold off on MRI brain as it will not   - Continue Aspirin 81mg and Atorvastatin 20mg  - Optimize stroke risk factors  - Check BP several times a ay at home after discharge and record and then bring to PCP for continued evaluation of antihypertensive regimen   - Goal of euglycemia  - STAT CT head for any acute change in neuro exam  - Medical management and supportive care per primary team  Correction of any metabolic or infectious disturbances    - No further inpatient neurologic recommendations    Hypertensive urgency  Assessment & Plan  - See above  - Goal of normotension  - Amlodipine added this admission      Amleia Sosa  will need follow up in in 6 weeks with neurovascular attending or advance practitioner  He will not require outpatient neurological testing  Subjective:   Patient reports he is doing well  Dizziness completely resolved  Minimal headache, not requesting any medication  BP much improved    All patient's questions were answered regarding his symptoms, BP and headache, as well as medication recommendations and follow-up  Past Medical History:   Diagnosis Date   • Diabetes mellitus ([facility] [address] )    • Hernia of abdominal cavity    • Hypertension    • Ulcer of esophagus      Past Surgical History:   Procedure Laterality Date   • CHOLECYSTECTOMY     • VASECTOMY       Family History   Problem Relation Age of Onset   • Alzheimer's disease Mother    • Hypertension Mother         When younger  • Emphysema Mother         When younger   • Diabetes Maternal Grandmother    • Hypertension Maternal Grandmother    • Coronary artery disease Maternal Grandmother    • Heart attack Maternal Grandmother    • Diabetes Paternal Aunt    • No Known Problems Father         Passed from Trauma   • Crohn's disease Brother    • Diabetes Brother    • Hypertension Brother    • Cancer Brother         throat     Social History     Socioeconomic History   • Marital status: /Civil Union     Spouse name: None   • Number of children: None   • Years of education: None   • Highest education level: None   Occupational History   • None   Tobacco Use   • Smoking status: Never   • Smokeless tobacco: Never   Vaping Use   • Vaping Use: Never used   Substance and Sexual Activity   • Alcohol use: Yes     Comment: Yearly or less, 1-2 drinks  • Drug use: Never   • Sexual activity: Yes     Partners: Female     Birth control/protection: Male Sterilization   Other Topics Concern   • None   Social History Narrative    Works as an      Social Determinants of Health     Financial Resource Strain: Not on file   Food Insecurity: Not on file   Transportation Needs: Not on file   Physical Activity: Not on file   Stress: Not on file   Social Connections: Not on file   Intimate Partner Violence: Not on file   Housing Stability: Not on file       Medications: Reviewed in detail by me      ROS: Review of Systems A 12 point ROS was completed and other than minor headache and those noted in the subjective section, all remaining systems were negative  Dizziness has resolved  Vitals: /98 (BP Location: Left arm)   Pulse 88   Temp (!) 97 °F (36 1 °C) (Temporal)   Resp 18   Ht 5' 9" (1 753 m)   Wt 100 kg (221 lb 5 5 oz)   SpO2 96%   BMI 32 69 kg/m²     Physical Exam:   Physical Exam  Constitutional:       General: He is not in acute distress  Appearance: Normal appearance  He is well-developed  He is not ill-appearing or toxic-appearing  HENT:      Head: Normocephalic and atraumatic  Eyes:      General: No scleral icterus  Right eye: No discharge  Left eye: No discharge  Extraocular Movements: Extraocular movements intact and EOM normal       Conjunctiva/sclera: Conjunctivae normal    Cardiovascular:      Rate and Rhythm: Normal rate and regular rhythm  Pulmonary:      Effort: Pulmonary effort is normal  No respiratory distress  Breath sounds: No stridor  Musculoskeletal:         General: No tenderness or deformity  Normal range of motion  Cervical back: Normal range of motion and neck supple  Skin:     General: Skin is warm and dry  Findings: No erythema or rash  Neurological:      Mental Status: He is alert and oriented to person, place, and time  Motor: Motor strength is normal    Psychiatric:         Speech: Speech normal        Neurologic Exam     Mental Status   Oriented to person, place, and time  Follows 2 step commands  Attention: normal  Concentration: normal    Speech: speech is normal (No dysarthria or aphasia)  Level of consciousness: alert  Knowledge: good  Normal comprehension  Cranial Nerves     CN II   Visual acuity: normal (grossly)    CN III, IV, VI   Extraocular motions are normal    Conjugate gaze: present    CN V   Facial sensation intact  CN VII   Facial expression full, symmetric       CN XII   Tongue deviation: none    Motor Exam   Muscle bulk: normal  Overall muscle tone: normal  Right arm pronator drift: absent  Left arm pronator drift: absent    Strength   Strength 5/5 throughout  Sensory Exam   Light touch normal        Labs: Reviewed in detail by me  Imaging: I have personally reviewed pertinent imaging and PACS reports  VTE Prophylaxis: Sequential compression device (Venodyne)     Total time spent today 35 minutes  Greater than 50% of total time was spent with the patient and / or family counseling and / or coordination of care   A description of the counseling / coordination of care: discussing symptoms, BP and headache, as well as medication recommendations and follow-up with patient and his wife

## 2022-11-30 NOTE — PLAN OF CARE
Problem: Potential for Falls  Goal: Patient will remain free of falls  Description: INTERVENTIONS:  - Educate patient/family on patient safety including physical limitations  - Instruct patient to call for assistance with activity   - Consult OT/PT to assist with strengthening/mobility   - Keep Call bell within reach  - Keep bed low and locked with side rails adjusted as appropriate  - Keep care items and personal belongings within reach  - Initiate and maintain comfort rounds  - Make Fall Risk Sign visible to staff  - Apply yellow socks and bracelet for high fall risk patients  - Consider moving patient to room near nurses station  Outcome: Progressing     Problem: PAIN - ADULT  Goal: Verbalizes/displays adequate comfort level or baseline comfort level  Description: Interventions:  - Encourage patient to monitor pain and request assistance  - Assess pain using appropriate pain scale  - Administer analgesics based on type and severity of pain and evaluate response  - Implement non-pharmacological measures as appropriate and evaluate response  - Consider cultural and social influences on pain and pain management  - Notify physician/advanced practitioner if interventions unsuccessful or patient reports new pain  Outcome: Progressing     Problem: INFECTION - ADULT  Goal: Absence or prevention of progression during hospitalization  Description: INTERVENTIONS:  - Assess and monitor for signs and symptoms of infection  - Monitor lab/diagnostic results  - Monitor all insertion sites, i e  indwelling lines, tubes, and drains  - Monitor endotracheal if appropriate and nasal secretions for changes in amount and color  - [place] appropriate cooling/warming therapies per order  - Administer medications as ordered  - Instruct and encourage patient and family to use good hand hygiene technique  - Identify and instruct in appropriate isolation precautions for identified infection/condition  Outcome: Progressing  Goal: Absence of fever/infection during neutropenic period  Description: INTERVENTIONS:  - Monitor WBC    Outcome: Progressing     Problem: SAFETY ADULT  Goal: Patient will remain free of falls  Description: INTERVENTIONS:  - Educate patient/family on patient safety including physical limitations  - Instruct patient to call for assistance with activity   - Consult OT/PT to assist with strengthening/mobility   - Keep Call bell within reach  - Keep bed low and locked with side rails adjusted as appropriate  - Keep care items and personal belongings within reach  - Initiate and maintain comfort rounds  - Make Fall Risk Sign visible to staff  - Apply yellow socks and bracelet for high fall risk patients  - Consider moving patient to room near nurses station  Outcome: Progressing  Goal: Maintain or return to baseline ADL function  Description: INTERVENTIONS:  -  Assess patient's ability to carry out ADLs; assess patient's baseline for ADL function and identify physical deficits which impact ability to perform ADLs (bathing, care of mouth/teeth, toileting, grooming, dressing, etc )  - Assess/evaluate cause of self-care deficits   - Assess range of motion  - Assess patient's mobility; develop plan if impaired  - Assess patient's need for assistive devices and provide as appropriate  - Encourage maximum independence but intervene and supervise when necessary  - Involve family in performance of ADLs  - Assess for home care needs following discharge   - Consider OT consult to assist with ADL evaluation and planning for discharge  - Provide patient education as appropriate  Outcome: Progressing  Goal: Maintains/Returns to pre admission functional level  Description: INTERVENTIONS:  - Perform BMAT or MOVE assessment daily    - Set and communicate daily mobility goal to care team and patient/family/caregiver  - Collaborate with rehabilitation services on mobility goals if consulted  - Perform Range of Motion 4 times a day    - Reposition patient every 2 hours  - Dangle patient 4 times a day  - Stand patient 4 times a day  - Ambulate patient 4 times a day  - Out of bed to chair 4 times a day   - Out of bed for meals 4 times a day  - Out of bed for toileting  - Record patient progress and toleration of activity level   Outcome: Progressing     Problem: DISCHARGE PLANNING  Goal: Discharge to home or other facility with appropriate resources  Description: INTERVENTIONS:  - Identify barriers to discharge w/patient and caregiver  - Arrange for needed discharge resources and transportation as appropriate  - Identify discharge learning needs (meds, wound care, etc )  - Arrange for interpretive services to assist at discharge as needed  - Refer to Case Management Department for coordinating discharge planning if the patient needs post-hospital services based on physician/advanced practitioner order or complex needs related to functional status, cognitive ability, or social support system  Outcome: Progressing     Problem: Knowledge Deficit  Goal: Patient/family/caregiver demonstrates understanding of disease process, treatment plan, medications, and discharge instructions  Description: Complete learning assessment and assess knowledge base    Interventions:  - Provide teaching at level of understanding  - Provide teaching via preferred learning methods  Outcome: Progressing     Problem: CARDIOVASCULAR - ADULT  Goal: Maintains optimal cardiac output and hemodynamic stability  Description: INTERVENTIONS:  - Monitor I/O, vital signs and rhythm  - Monitor for S/S and trends of decreased cardiac output  - Administer and titrate ordered vasoactive medications to optimize hemodynamic stability  - Assess quality of pulses, skin color and temperature  - Assess for signs of decreased coronary artery perfusion  - Instruct patient to report change in severity of symptoms  Outcome: Progressing  Goal: Absence of cardiac dysrhythmias or at baseline rhythm  Description: INTERVENTIONS:  - Continuous cardiac monitoring, vital signs, obtain 12 lead EKG if ordered  - Administer antiarrhythmic and heart rate control medications as ordered  - Monitor electrolytes and administer replacement therapy as ordered  Outcome: Progressing     Problem: DECISION MAKING  Goal: Pt/Family able to effectively weigh alternatives and participate in decision making related to treatment and care  Description: INTERVENTIONS:  - Identify decision maker  - Determine when there are differences among patient's view, family's view, and healthcare provider's view of patient condition and care goals  - Facilitate patient/family articulation of goals for care  - Help patient/family identify pros/cons of alternative solutions  - Provide information as requested by patient/family  - Respect patient/family rights related to privacy and sharing information   - Serve as a liaison between patient, family and health care team  - Initiate consults as appropriate (Ethics Team, Palliative Care, Family Care Conference, etc )  Outcome: Progressing     Problem: Neurological Deficit  Goal: Neurological status is stable or improving  Description: Interventions:  - Monitor and assess patient's level of consciousness, motor function, sensory function, and level of assistance needed for ADLs  - Monitor and report changes from baseline  Collaborate with interdisciplinary team to initiate plan and implement interventions as ordered  - Provide and maintain a safe environment  - Consider seizure precautions  - Consider fall precautions  - Consider aspiration precautions  - Consider bleeding precautions  Outcome: Progressing     Problem: Activity Intolerance/Impaired Mobility  Goal: Mobility/activity is maintained at optimum level for patient  Description: Interventions:  - Assess and monitor patient  barriers to mobility and need for assistive/adaptive devices  - Assess patient's emotional response to limitations    - Collaborate with interdisciplinary team and initiate plans and interventions as ordered  - Encourage independent activity per ability   - Maintain proper body alignment  - Perform active/passive rom as tolerated/ordered    - Plan activities to conserve energy   - Turn patient as appropriate  Outcome: Progressing

## 2022-11-30 NOTE — ASSESSMENT & PLAN NOTE
Lab Results   Component Value Date    HGBA1C 8 2 (H) 11/29/2022       Recent Labs     11/29/22  0700 11/29/22  1213 11/29/22  1803 11/29/22  2101   POCGLU 111 160* 137 193*     Blood sugars fairly controlled on inpatient  Continue home p o   Diabetic medications including glipizide, Jardiance, metformin and semaglutide

## 2022-11-30 NOTE — PLAN OF CARE
Problem: Potential for Falls  Goal: Patient will remain free of falls  Description: INTERVENTIONS:  - Educate patient/family on patient safety including physical limitations  - Instruct patient to call for assistance with activity   - Consult OT/PT to assist with strengthening/mobility   - Keep Call bell within reach  - Keep bed low and locked with side rails adjusted as appropriate  - Keep care items and personal belongings within reach  - Initiate and maintain comfort rounds  - Make Fall Risk Sign visible to staff  - Offer Toileting every  Hours, in advance of need  - Initiate/Maintain alarm  - Obtain necessary fall risk management equipment:   - Apply yellow socks and bracelet for high fall risk patients  - Consider moving patient to room near nurses station  11/30/2022 1306 by Kamar Solis RN  Outcome: Completed  11/30/2022 1112 by Kamar Solis RN  Outcome: Progressing     Problem: PAIN - ADULT  Goal: Verbalizes/displays adequate comfort level or baseline comfort level  Description: Interventions:  - Encourage patient to monitor pain and request assistance  - Assess pain using appropriate pain scale  - Administer analgesics based on type and severity of pain and evaluate response  - Implement non-pharmacological measures as appropriate and evaluate response  - Consider cultural and social influences on pain and pain management  - Notify physician/advanced practitioner if interventions unsuccessful or patient reports new pain  11/30/2022 1306 by Kamar Solis RN  Outcome: Completed  11/30/2022 1112 by Kamar Solis RN  Outcome: Progressing     Problem: INFECTION - ADULT  Goal: Absence or prevention of progression during hospitalization  Description: INTERVENTIONS:  - Assess and monitor for signs and symptoms of infection  - Monitor lab/diagnostic results  - Monitor all insertion sites, i e  indwelling lines, tubes, and drains  - Monitor endotracheal if appropriate and nasal secretions for changes in amount and color  - [place] appropriate cooling/warming therapies per order  - Administer medications as ordered  - Instruct and encourage patient and family to use good hand hygiene technique  - Identify and instruct in appropriate isolation precautions for identified infection/condition  11/30/2022 1306 by Simone Bales RN  Outcome: Completed  11/30/2022 1112 by Simone Bales RN  Outcome: Progressing  Goal: Absence of fever/infection during neutropenic period  Description: INTERVENTIONS:  - Monitor WBC    11/30/2022 1306 by Simone Bales RN  Outcome: Completed  11/30/2022 1112 by Simone Bales RN  Outcome: Progressing     Problem: SAFETY ADULT  Goal: Patient will remain free of falls  Description: INTERVENTIONS:  - Educate patient/family on patient safety including physical limitations  - Instruct patient to call for assistance with activity   - Consult OT/PT to assist with strengthening/mobility   - Keep Call bell within reach  - Keep bed low and locked with side rails adjusted as appropriate  - Keep care items and personal belongings within reach  - Initiate and maintain comfort rounds  - Make Fall Risk Sign visible to staff  - Offer Toileting every  Hours, in advance of need  - Initiate/Maintain alarm  - Obtain necessary fall risk management equipment:   - Apply yellow socks and bracelet for high fall risk patients  - Consider moving patient to room near nurses station  11/30/2022 1306 by Simone Bales RN  Outcome: Completed  11/30/2022 1112 by Simone Bales RN  Outcome: Progressing  Goal: Maintain or return to baseline ADL function  Description: INTERVENTIONS:  -  Assess patient's ability to carry out ADLs; assess patient's baseline for ADL function and identify physical deficits which impact ability to perform ADLs (bathing, care of mouth/teeth, toileting, grooming, dressing, etc )  - Assess/evaluate cause of self-care deficits   - Assess range of motion  - Assess patient's mobility; develop plan if impaired  - Assess patient's need for assistive devices and provide as appropriate  - Encourage maximum independence but intervene and supervise when necessary  - Involve family in performance of ADLs  - Assess for home care needs following discharge   - Consider OT consult to assist with ADL evaluation and planning for discharge  - Provide patient education as appropriate  11/30/2022 1306 by Roberto Carlos Puri RN  Outcome: Completed  11/30/2022 1112 by Roberto Carlos Puri RN  Outcome: Progressing  Goal: Maintains/Returns to pre admission functional level  Description: INTERVENTIONS:  - Perform BMAT or MOVE assessment daily    - Set and communicate daily mobility goal to care team and patient/family/caregiver  - Collaborate with rehabilitation services on mobility goals if consulted  - Perform Range of Motion  times a day  - Reposition patient every  hours    - Dangle patient  times a day  - Stand patient  times a day  - Ambulate patient  times a day  - Out of bed to chair  times a day   - Out of bed for meals times a day  - Out of bed for toileting  - Record patient progress and toleration of activity level   11/30/2022 1306 by Roberto Carlos Puri RN  Outcome: Completed  11/30/2022 1112 by Roberto Carlos Puri RN  Outcome: Progressing     Problem: DISCHARGE PLANNING  Goal: Discharge to home or other facility with appropriate resources  Description: INTERVENTIONS:  - Identify barriers to discharge w/patient and caregiver  - Arrange for needed discharge resources and transportation as appropriate  - Identify discharge learning needs (meds, wound care, etc )  - Arrange for interpretive services to assist at discharge as needed  - Refer to Case Management Department for coordinating discharge planning if the patient needs post-hospital services based on physician/advanced practitioner order or complex needs related to functional status, cognitive ability, or social support system  11/30/2022 1306 by Antwon Brandt RN  Outcome: Completed  11/30/2022 1112 by Antwon Brandt RN  Outcome: Progressing     Problem: Knowledge Deficit  Goal: Patient/family/caregiver demonstrates understanding of disease process, treatment plan, medications, and discharge instructions  Description: Complete learning assessment and assess knowledge base    Interventions:  - Provide teaching at level of understanding  - Provide teaching via preferred learning methods  11/30/2022 1306 by Antwon Brandt RN  Outcome: Completed  11/30/2022 1112 by Antwon Brandt RN  Outcome: Progressing     Problem: CARDIOVASCULAR - ADULT  Goal: Maintains optimal cardiac output and hemodynamic stability  Description: INTERVENTIONS:  - Monitor I/O, vital signs and rhythm  - Monitor for S/S and trends of decreased cardiac output  - Administer and titrate ordered vasoactive medications to optimize hemodynamic stability  - Assess quality of pulses, skin color and temperature  - Assess for signs of decreased coronary artery perfusion  - Instruct patient to report change in severity of symptoms  11/30/2022 1306 by Antwon Brandt RN  Outcome: Completed  11/30/2022 1112 by Antwon Brandt RN  Outcome: Progressing  Goal: Absence of cardiac dysrhythmias or at baseline rhythm  Description: INTERVENTIONS:  - Continuous cardiac monitoring, vital signs, obtain 12 lead EKG if ordered  - Administer antiarrhythmic and heart rate control medications as ordered  - Monitor electrolytes and administer replacement therapy as ordered  11/30/2022 1306 by Antwon Brandt RN  Outcome: Completed  11/30/2022 1112 by Antwon Brandt RN  Outcome: Progressing     Problem: DECISION MAKING  Goal: Pt/Family able to effectively weigh alternatives and participate in decision making related to treatment and care  Description: INTERVENTIONS:  - Identify decision maker  - Determine when there are differences among patient's view, family's view, and healthcare provider's view of patient condition and care goals  - Facilitate patient/family articulation of goals for care  - Help patient/family identify pros/cons of alternative solutions  - Provide information as requested by patient/family  - Respect patient/family rights related to privacy and sharing information   - Serve as a liaison between patient, family and health care team  - Initiate consults as appropriate (Ethics Team, Palliative Care, [facility], etc )  11/30/2022 1306 by Coretta Mandel RN  Outcome: Completed  11/30/2022 1112 by Coretta Mandel RN  Outcome: Progressing     Problem: Neurological Deficit  Goal: Neurological status is stable or improving  Description: Interventions:  - Monitor and assess patient's level of consciousness, motor function, sensory function, and level of assistance needed for ADLs  - Monitor and report changes from baseline  Collaborate with interdisciplinary team to initiate plan and implement interventions as ordered  - Provide and maintain a safe environment  - Consider seizure precautions  - Consider fall precautions  - Consider aspiration precautions  - Consider bleeding precautions  11/30/2022 1306 by Coretta Mandel RN  Outcome: Completed  11/30/2022 1112 by Coretta Mandel RN  Outcome: Progressing     Problem: Activity Intolerance/Impaired Mobility  Goal: Mobility/activity is maintained at optimum level for patient  Description: Interventions:  - Assess and monitor patient  barriers to mobility and need for assistive/adaptive devices  - Assess patient's emotional response to limitations  - Collaborate with interdisciplinary team and initiate plans and interventions as ordered  - Encourage independent activity per ability   - Maintain proper body alignment  - Perform active/passive rom as tolerated/ordered    - Plan activities to conserve energy   - Turn patient as appropriate  11/30/2022 1306 by Coretta Mandel RN  Outcome: Completed  11/30/2022 1112 by Felicita Villeda RN  Outcome: Progressing

## 2022-12-01 ENCOUNTER — TRANSITIONAL CARE MANAGEMENT (OUTPATIENT)
Dept: FAMILY MEDICINE CLINIC | Facility: CLINIC | Age: 52
End: 2022-12-01

## 2022-12-02 ENCOUNTER — TELEMEDICINE (OUTPATIENT)
Dept: DIABETES SERVICES | Facility: CLINIC | Age: 52
End: 2022-12-02

## 2022-12-02 DIAGNOSIS — E11.42 TYPE 2 DIABETES MELLITUS WITH DIABETIC POLYNEUROPATHY, WITHOUT LONG-TERM CURRENT USE OF INSULIN (HCC): Primary | ICD-10-CM

## 2022-12-02 NOTE — PROGRESS NOTES
Virtual Regular Visit    Verification of patient location:    Patient is located in the following state in which I hold an active license [place]      Assessment/Plan:    Problem List Items Addressed This Visit    None           Reason for visit is   Chief Complaint   Patient presents with   • Diabetes Type 2   • Virtual Regular Visit        Encounter provider [address] [place] [address]    Provider located at [address] [place] [id number] [phone]      Recent Visits  No visits were found meeting these conditions  Showing recent visits within past 7 days and meeting all other requirements  Today's Visits  Date Type Provider Dept   12/02/22 Telemedicine [address] [place] [address] [facility]abetic Education Donovanshanika Comes today's visits and meeting all other requirements  Future Appointments  No visits were found meeting these conditions  Showing future appointments within next 150 days and meeting all other requirements       The patient was identified by name and date of birth  Nathalia Holliday  was informed that this is a telemedicine visit and that the visit is being conducted through the [facility]  He agrees to proceed     My office door was closed  No one else was in the room  He acknowledged consent and understanding of privacy and security of the video platform  The patient has agreed to participate and understands they can discontinue the visit at any time  Patient is aware this is a billable service  Subjective  Nathalia Holliday  is a 45 y o  male with Type 2 Diabetes Mellitus         HPI     Past Medical History:   Diagnosis Date   • Diabetes mellitus ([address] )    • Hernia of abdominal cavity    • Hypertension    • Ulcer of esophagus        Past Surgical History:   Procedure Laterality Date   • CHOLECYSTECTOMY     • VASECTOMY         Current Outpatient Medications   Medication Sig Dispense Refill   • amLODIPine (NORVASC) 10 mg tablet Take 1 tablet (10 mg total) by mouth daily 30 tablet 0   • aspirin 81 mg chewable tablet Chew 1 tablet (81 mg total) daily 30 tablet 0   • atorvastatin (LIPITOR) 20 mg tablet Take 1 tablet (20 mg total) by mouth every evening 30 tablet 0   • Empagliflozin (Jardiance) 25 MG TABS Take 1 tablet (25 mg total) by mouth every morning 90 tablet 1   • gabapentin (NEURONTIN) 600 MG tablet TAKE 2 TABLETS(1200 MG) BY MOUTH DAILY AT BEDTIME 60 tablet 5   • glipiZIDE (GLUCOTROL) 10 mg tablet TAKE 1 TABLET BY MOUTH 2 TIMES A DAY BEFORE MEALS  180 tablet 1   • lisinopril (ZESTRIL) 20 mg tablet Take 1 tablet (20 mg total) by mouth daily 30 tablet 0   • metFORMIN (GLUCOPHAGE) 1000 MG tablet TAKE 1 TABLET(1000 MG) BY MOUTH TWICE DAILY WITH MEALS 60 tablet 5   • Semaglutide 7 MG TABS Take 7 mg by mouth daily     • tadalafil (CIALIS) 20 MG tablet Take 1 PO as needed about 1 hour before sex  8 tablet 5   • Testosterone 12 5 MG/ACT (1%) GEL Place 2 actuation on the skin in the morning 75 g 0     No current facility-administered medications for this visit  No Known Allergies    Review of Systems    Video Exam    There were no vitals filed for this visit  Physical Exam     I spent 90 minutes directly with the patient during this visit    Living Well with Diabetes Group Class #1    Britta Dugan  attended the Living Well with Diabetes Group Class #1  Topics Covered in class today include: What is diabetes; Types of Diabetes; How Diabetes is diagnosed; Management skills; the role of exercise in blood sugar managements, Home glucose monitoring, and target ranges  Jael Yang participated in group activities    The patient's history was reviewed and updated as appropriate: allergies, current medications      Lab Results   Component Value Date    HGBA1C 8 2 (H) 11/29/2022       Diabetes Education Record  Walterayesha Celia was provided Living Well with Diabetes Class #1 book      Patient response to instruction    Comprehensiongood  Motivationgood  Expected Compliancegood    Begin Time: 9:30AM  End Time: 11:00AM  Referring Provider: Dr John Leon    Thank you for referring your patient to [address] [place] [address], it was a pleasure working with them today  Please feel free to call with any questions or concerns      Aspen Morfin  [address] [place] [id number] [phone]

## 2022-12-05 ENCOUNTER — TELEPHONE (OUTPATIENT)
Dept: FAMILY MEDICINE CLINIC | Facility: CLINIC | Age: 52
End: 2022-12-05

## 2022-12-05 NOTE — TELEPHONE ENCOUNTER
Patient called in and stated he has an appointment scheduled for 12/7/22 and he was recently in the hospital and they did take blood work while he was there, he would like to know if she needs to get blood work done for Dr Carroll Church still? Because he doesn't want to pay double, please give patient a call back when possible thank you! Patient would like to go to the lab tomorrow if he still needs blood work done

## 2022-12-05 NOTE — TELEPHONE ENCOUNTER
Reviewed the labs in the hospital, included a lipid panel, A1c, CMP, therefore there would not be a specific need to repeat blood work at this time

## 2022-12-06 ENCOUNTER — APPOINTMENT (OUTPATIENT)
Dept: LAB | Facility: MEDICAL CENTER | Age: 52
End: 2022-12-06

## 2022-12-06 DIAGNOSIS — E78.2 MIXED HYPERLIPIDEMIA: ICD-10-CM

## 2022-12-06 DIAGNOSIS — E11.42 TYPE 2 DIABETES MELLITUS WITH DIABETIC POLYNEUROPATHY, WITHOUT LONG-TERM CURRENT USE OF INSULIN (HCC): ICD-10-CM

## 2022-12-06 DIAGNOSIS — R79.89 LOW TESTOSTERONE IN MALE: ICD-10-CM

## 2022-12-06 DIAGNOSIS — N52.8 OTHER MALE ERECTILE DYSFUNCTION: ICD-10-CM

## 2022-12-06 LAB
ALBUMIN SERPL BCP-MCNC: 3.9 G/DL (ref 3.5–5)
ALP SERPL-CCNC: 75 U/L (ref 46–116)
ALT SERPL W P-5'-P-CCNC: 48 U/L (ref 12–78)
ANION GAP SERPL CALCULATED.3IONS-SCNC: 5 MMOL/L (ref 4–13)
AST SERPL W P-5'-P-CCNC: 24 U/L (ref 5–45)
BILIRUB SERPL-MCNC: 0.7 MG/DL (ref 0.2–1)
BUN SERPL-MCNC: 17 MG/DL (ref 5–25)
CALCIUM SERPL-MCNC: 10.3 MG/DL (ref 8.3–10.1)
CHLORIDE SERPL-SCNC: 106 MMOL/L (ref 96–108)
CHOLEST SERPL-MCNC: 135 MG/DL
CO2 SERPL-SCNC: 26 MMOL/L (ref 21–32)
CREAT SERPL-MCNC: 0.94 MG/DL (ref 0.6–1.3)
EST. AVERAGE GLUCOSE BLD GHB EST-MCNC: 171 MG/DL
GFR SERPL CREATININE-BSD FRML MDRD: 91 ML/MIN/1.73SQ M
GLUCOSE P FAST SERPL-MCNC: 127 MG/DL (ref 65–99)
HBA1C MFR BLD: 7.6 %
HDLC SERPL-MCNC: 35 MG/DL
LDLC SERPL CALC-MCNC: 66 MG/DL (ref 0–100)
NONHDLC SERPL-MCNC: 100 MG/DL
POTASSIUM SERPL-SCNC: 4.4 MMOL/L (ref 3.5–5.3)
PROT SERPL-MCNC: 7.9 G/DL (ref 6.4–8.4)
PSA SERPL-MCNC: 0.2 NG/ML (ref 0–4)
SODIUM SERPL-SCNC: 137 MMOL/L (ref 135–147)
TRIGL SERPL-MCNC: 168 MG/DL

## 2022-12-07 ENCOUNTER — TELEMEDICINE (OUTPATIENT)
Dept: DIABETES SERVICES | Facility: CLINIC | Age: 52
End: 2022-12-07

## 2022-12-07 ENCOUNTER — OFFICE VISIT (OUTPATIENT)
Dept: FAMILY MEDICINE CLINIC | Facility: CLINIC | Age: 52
End: 2022-12-07

## 2022-12-07 VITALS
TEMPERATURE: 99 F | BODY MASS INDEX: 33.18 KG/M2 | SYSTOLIC BLOOD PRESSURE: 148 MMHG | HEIGHT: 69 IN | DIASTOLIC BLOOD PRESSURE: 80 MMHG | OXYGEN SATURATION: 99 % | RESPIRATION RATE: 20 BRPM | HEART RATE: 90 BPM | WEIGHT: 224 LBS

## 2022-12-07 DIAGNOSIS — Z12.11 ENCOUNTER FOR SCREENING COLONOSCOPY: ICD-10-CM

## 2022-12-07 DIAGNOSIS — I16.0 HYPERTENSIVE URGENCY: Primary | ICD-10-CM

## 2022-12-07 DIAGNOSIS — Z23 NEEDS FLU SHOT: ICD-10-CM

## 2022-12-07 DIAGNOSIS — E78.2 MIXED HYPERLIPIDEMIA: ICD-10-CM

## 2022-12-07 DIAGNOSIS — E11.42 TYPE 2 DIABETES MELLITUS WITH DIABETIC POLYNEUROPATHY, WITHOUT LONG-TERM CURRENT USE OF INSULIN (HCC): ICD-10-CM

## 2022-12-07 DIAGNOSIS — R42 DIZZINESS: ICD-10-CM

## 2022-12-07 DIAGNOSIS — E11.42 TYPE 2 DIABETES MELLITUS WITH DIABETIC POLYNEUROPATHY, WITHOUT LONG-TERM CURRENT USE OF INSULIN (HCC): Primary | ICD-10-CM

## 2022-12-07 PROBLEM — E78.5 HYPERLIPIDEMIA: Status: ACTIVE | Noted: 2022-12-07

## 2022-12-07 LAB
TESTOST FREE SERPL-MCNC: 9 PG/ML (ref 7.2–24)
TESTOST SERPL-MCNC: 288 NG/DL (ref 264–916)

## 2022-12-07 RX ORDER — GLIPIZIDE 5 MG/1
5 TABLET ORAL
Qty: 60 TABLET | Refills: 5 | Status: SHIPPED | OUTPATIENT
Start: 2022-12-07

## 2022-12-07 RX ORDER — ATORVASTATIN CALCIUM 20 MG/1
20 TABLET, FILM COATED ORAL EVERY EVENING
Qty: 30 TABLET | Refills: 5 | Status: SHIPPED | OUTPATIENT
Start: 2022-12-07 | End: 2023-01-06

## 2022-12-07 RX ORDER — AMLODIPINE BESYLATE 10 MG/1
5 TABLET ORAL DAILY
Qty: 15 TABLET | Refills: 0
Start: 2022-12-07 | End: 2023-01-06

## 2022-12-07 NOTE — PATIENT INSTRUCTIONS
1  Hypertensive urgency  Assessment & Plan:  Blood pressure today clearly is not significantly elevated, though it is slightly elevated  Since he just had medications adjusted at the end of November, it is unreasonable for me to make significant changes in his blood pressure medication, with the exception of decreasing the amlodipine due to orthostatic symptoms  Orders:  -     MRI brain IAC wo and w contrast; Future; Expected date: 12/07/2022  -     amLODIPine (NORVASC) 10 mg tablet; Take 0 5 tablets (5 mg total) by mouth daily  -     Ambulatory Referral to Neurology; Future    2  Type 2 diabetes mellitus with diabetic polyneuropathy, without long-term current use of insulin ([facility])  Assessment & Plan:    Lab Results   Component Value Date    HGBA1C 7 6 (H) 12/06/2022   Patient is having some significant drops in his sugar  His fasting sugars in the mornings are now between 80 and 90 almost every day  With that, he has been holding the Rybelsus, but I would recommend instead that he decrease the glipizide to 5 mg twice daily  Glipizide is a sulfonylurea, which means it will force lower sugars by increasing insulin  This can have a result of hypoglycemic episodes, which the other medications are less likely to do  Given that his A1c is significantly decreased and dropping, I would prefer to decrease the sulfonylurea a bit as he is making significant lifestyle changes, as well as continuing on the other medications  Orders:  -     glipiZIDE (GLUCOTROL) 5 mg tablet; Take 1 tablet (5 mg total) by mouth 2 (two) times a day before meals  -     Comprehensive metabolic panel; Future; Expected date: 03/07/2023  -     Hemoglobin A1C; Future; Expected date: 03/07/2023  -     Microalbumin / creatinine urine ratio; Future; Expected date: 03/07/2023    3  Dizziness  Assessment & Plan:  Patient is experiencing some symptoms of hypotension    This based on at least 2 episodes where he would be sitting or bending over and then coming back up and feeling extremely lightheaded and dizzy  In the hospital, they did start him on 2 blood pressure medications at the same time at reasonable doses  With that, I would recommend that we decrease the amlodipine to 5 mg  Certainly, I would continue with hydration, and the MRI as recommended by hospitalist on discharge  Orders:  -     MRI brain IAC wo and w contrast; Future; Expected date: 12/07/2022  -     Ambulatory Referral to Neurology; Future    4  Mixed hyperlipidemia  Assessment & Plan:  Patient was started on atorvastatin in the hospital   His repeat cholesterol panel that was done immediately after discharge shows a significant improvement already  Check in 6 months  Continue atorvastatin  Orders:  -     atorvastatin (LIPITOR) 20 mg tablet; Take 1 tablet (20 mg total) by mouth every evening    5  Encounter for screening colonoscopy  -     Ambulatory referral for colonoscopy; Future    6  Needs flu shot      COVID 19 Instructions    Kelechi Juarez  was advised to limit contact with others to essential tasks such as getting food, medications, and medical care  Proper handwashing reviewed, and Hand sanitzer when washing is not available  If the patient develops symptoms of COVID 19, the patient should call the office as soon as possible  For 5626-5148 Flu season, it is strongly recommended that Flu Vaccinations be obtained  Virtual Visits:  [place]: This works on smart phones (any phone with Internet browsing capability)  You should get a text message when the provider is ready to see you  Click on the link in the text message, and the call should start  You will need to type in your name, and allow camera and microphone access  This is HIPPA compliant, and secure  If you have not already done so, get immunized to COVID 19        We are committed to getting you vaccinated as soon as possible and will be closely following CDC and [facility]  guidelines as they are released and revised  Please refer to our COVID-19 vaccine webpage for the most up to date information on the vaccine and our distribution efforts  This site will also have the most up to date recommendations for COVID booster vaccine  Sapna cabrera    Call [phone] ([phone]), option 7    OUR NEW LOCATION:    [address], [facility] [address] , [place], [address]  Fax: [phone]    Lab services and OB/GYN are at this location as well

## 2022-12-07 NOTE — PROGRESS NOTES
Virtual Regular Visit    Verification of patient location:    Patient is located in the following state in which I hold an active license [place]      Assessment/Plan:    Problem List Items Addressed This Visit    None           Reason for visit is   Chief Complaint   Patient presents with   • Diabetes Type 2   • Virtual Regular Visit        Encounter provider [address] [place] [address]    Provider located at [address] [place] [id number] [phone]      Recent Visits  Date Type Provider Dept   12/05/22 Telephone Lawrence Dove MD [address] [place] [address] Primary Care   12/02/22 Telemedicine [address] [place] [address] [facility] Aleksandr Blocker recent visits within past 7 days and meeting all other requirements  Today's Visits  Date Type Provider Dept   12/07/22 Telemedicine [address] [place] [address] [facility] Aleksandr Blocker today's visits and meeting all other requirements  Future Appointments  No visits were found meeting these conditions  Showing future appointments within next 150 days and meeting all other requirements       The patient was identified by name and date of birth  Elke Brito  was informed that this is a telemedicine visit and that the visit is being conducted through the [employer] [facility]  He agrees to proceed     My office door was closed  No one else was in the room  He acknowledged consent and understanding of privacy and security of the video platform  The patient has agreed to participate and understands they can discontinue the visit at any time  Patient is aware this is a billable service  Subjective  Elke Brito  is a 45 y o  male with Type 2 Diabetes Mellitus  Lidia Perla     Past Medical History:   Diagnosis Date   • Diabetes mellitus ([address] )    • Hernia of abdominal cavity    • Hypertension    • Ulcer of esophagus        Past Surgical History:   Procedure Laterality Date   • CHOLECYSTECTOMY • VASECTOMY         Current Outpatient Medications   Medication Sig Dispense Refill   • amLODIPine (NORVASC) 10 mg tablet Take 1 tablet (10 mg total) by mouth daily 30 tablet 0   • aspirin 81 mg chewable tablet Chew 1 tablet (81 mg total) daily 30 tablet 0   • atorvastatin (LIPITOR) 20 mg tablet Take 1 tablet (20 mg total) by mouth every evening 30 tablet 0   • Empagliflozin (Jardiance) 25 MG TABS Take 1 tablet (25 mg total) by mouth every morning 90 tablet 1   • gabapentin (NEURONTIN) 600 MG tablet TAKE 2 TABLETS(1200 MG) BY MOUTH DAILY AT BEDTIME 60 tablet 5   • glipiZIDE (GLUCOTROL) 10 mg tablet TAKE 1 TABLET BY MOUTH 2 TIMES A DAY BEFORE MEALS  180 tablet 1   • lisinopril (ZESTRIL) 20 mg tablet Take 1 tablet (20 mg total) by mouth daily 30 tablet 0   • metFORMIN (GLUCOPHAGE) 1000 MG tablet TAKE 1 TABLET(1000 MG) BY MOUTH TWICE DAILY WITH MEALS 60 tablet 5   • Semaglutide 7 MG TABS Take 7 mg by mouth daily     • tadalafil (CIALIS) 20 MG tablet Take 1 PO as needed about 1 hour before sex  8 tablet 5   • Testosterone 12 5 MG/ACT (1%) GEL Place 2 actuation on the skin in the morning 75 g 0     No current facility-administered medications for this visit  No Known Allergies    Review of Systems    Video Exam    There were no vitals filed for this visit  Physical Exam     I spent 90 minutes directly with the patient during this visit     Living Well with Diabetes Group Class #2    Niles Merlin  attended the Living Well with Diabetes Group Class #2  During class, Chan Eli was instructed on the following topics: Macronutrients, Carbohydrate sources, What one serving of carbohydrate equals, effects of diet on blood glucose levels, effect of carbohydrates on blood glucose levels, basics of meal planning: balance, portions, meal times, measurements, reading food labels to determine carbohydrates       Chan Eli participated in group activities of reading labels together and completing the meal planning activity  Hayley Gatica will follow up with class #3  Will call with any questions or concerns prior to next session  The patient's history was reviewed and updated as appropriate: allergies, current medications  Lab Results   Component Value Date    HGBA1C 7 6 (H) 12/06/2022       Diabetes Education Record  Hayley Gatica was provided Living Well with Diabetes Class #2 book      Patient response to instruction    Comprehensiongood  Motivationgood  Expected Compliancegood    Begin Time: 9:30AM  End Time: 11:00AM  Referring Provider: Dr Trena Coles    Thank you for referring your patient to [address] [place] [address], it was a pleasure working with them today  Please feel free to call with any questions or concerns      Merrill Rubio  [address] [place] [id number] [phone]

## 2022-12-07 NOTE — ASSESSMENT & PLAN NOTE
Patient was started on atorvastatin in the hospital   His repeat cholesterol panel that was done immediately after discharge shows a significant improvement already  Check in 6 months  Continue atorvastatin

## 2022-12-07 NOTE — ASSESSMENT & PLAN NOTE
Lab Results   Component Value Date    HGBA1C 7 6 (H) 12/06/2022   Patient is having some significant drops in his sugar  His fasting sugars in the mornings are now between 80 and 90 almost every day  With that, he has been holding the Rybelsus, but I would recommend instead that he decrease the glipizide to 5 mg twice daily  Glipizide is a sulfonylurea, which means it will force lower sugars by increasing insulin  This can have a result of hypoglycemic episodes, which the other medications are less likely to do  Given that his A1c is significantly decreased and dropping, I would prefer to decrease the sulfonylurea a bit as he is making significant lifestyle changes, as well as continuing on the other medications

## 2022-12-07 NOTE — PROGRESS NOTES
Name: Winter Boswell  : 1970      MRN: [id number]  Encounter Provider: Shaye Contreras MD  Encounter Date: 2022   Encounter department: [facility] PRIMARY CARE    Assessment & Plan     1  Hypertensive urgency  Assessment & Plan:  Blood pressure today clearly is not significantly elevated, though it is slightly elevated  Since he just had medications adjusted at the end of November, it is unreasonable for me to make significant changes in his blood pressure medication, with the exception of decreasing the amlodipine due to orthostatic symptoms  Orders:  -     MRI brain IAC wo and w contrast; Future; Expected date: 2022  -     amLODIPine (NORVASC) 10 mg tablet; Take 0 5 tablets (5 mg total) by mouth daily  -     Ambulatory Referral to Neurology; Future    2  Type 2 diabetes mellitus with diabetic polyneuropathy, without long-term current use of insulin ([facility])  Assessment & Plan:    Lab Results   Component Value Date    HGBA1C 7 6 (H) 2022   Patient is having some significant drops in his sugar  His fasting sugars in the mornings are now between 80 and 90 almost every day  With that, he has been holding the Rybelsus, but I would recommend instead that he decrease the glipizide to 5 mg twice daily  Glipizide is a sulfonylurea, which means it will force lower sugars by increasing insulin  This can have a result of hypoglycemic episodes, which the other medications are less likely to do  Given that his A1c is significantly decreased and dropping, I would prefer to decrease the sulfonylurea a bit as he is making significant lifestyle changes, as well as continuing on the other medications  Orders:  -     glipiZIDE (GLUCOTROL) 5 mg tablet; Take 1 tablet (5 mg total) by mouth 2 (two) times a day before meals  -     Comprehensive metabolic panel;  Future; Expected date: 2023  -     Hemoglobin A1C; Future; Expected date: 2023  -     Microalbumin / creatinine urine ratio; Future; Expected date: 03/07/2023    3  Dizziness  Assessment & Plan:  Patient is experiencing some symptoms of hypotension  This based on at least 2 episodes where he would be sitting or bending over and then coming back up and feeling extremely lightheaded and dizzy  In the hospital, they did start him on 2 blood pressure medications at the same time at reasonable doses  With that, I would recommend that we decrease the amlodipine to 5 mg  Certainly, I would continue with hydration, and the MRI as recommended by hospitalist on discharge  Orders:  -     MRI brain IAC wo and w contrast; Future; Expected date: 12/07/2022  -     Ambulatory Referral to Neurology; Future    4  Mixed hyperlipidemia  Assessment & Plan:  Patient was started on atorvastatin in the hospital   His repeat cholesterol panel that was done immediately after discharge shows a significant improvement already  Check in 6 months  Continue atorvastatin  Orders:  -     atorvastatin (LIPITOR) 20 mg tablet; Take 1 tablet (20 mg total) by mouth every evening    5  Encounter for screening colonoscopy  -     Ambulatory referral for colonoscopy; Future    6  Needs flu shot        Depression Screening and Follow-up Plan: Patient was screened for depression during today's encounter  They screened negative with a PHQ-2 score of 0  Subjective     Chief Complaint   Patient presents with   • Transition of Care Management   • Diabetes        Here to follow up on multiple issues  He was having dizziness lately  He was noting this before work  Had severe elevation in BP at that point  Went to ER as was increased on repeat  Hospital Course:      Sigmund Fothergill  is a 45 y o  male patient who originally presented to the hospital on 11/28/2022 due to dizziness  Initially suspect that patient may have had a stroke and placed on stroke pathway  Initial CTA head and neck was negative for any acute processes    Neurology evaluated in person, suspect patient's dizziness was likely due to uncontrolled hypertension  Systolic was noted to be greater than 220 on admission  Did not recommend MRI but can be done as an outpatient as patient's symptoms has mainly resolved  Patient was started on Norvasc, lisinopril with blood pressure improving to 907E systolic  Neurology did recommend the patient continue aspirin 81 mg, Lipitor 20 mg as patient does have risk factors including obesity, type 2 diabetes and hypertension  Recommended follow-up with Neurology outpatient to consider outpatient MRI  Follow closely with PCP as patient's blood pressure medications may need further titration  DM: He is taking Rybelsus  Has been noting some issues with the timing of Rybelsus  Has been to [facility] classes  He did report that this last week he is paying closer attention to the DM  In the hospital, he was started on hyperlipidemia medication, atorvastatin  Cholesterol was slightly elevated there  Hypertension: As per above, patient was having hypertensive urgency in the hospital   They started him on amlodipine 10 mg, and lisinopril 20 mg  He was previously also on Jardiance 25 mg  Review of Systems   Constitutional: Negative  HENT: Negative  Eyes: Negative  Respiratory: Negative  Cardiovascular: Negative  Gastrointestinal: Negative  Endocrine: Negative  Genitourinary: Negative  Musculoskeletal: Negative  Skin: Negative  Allergic/Immunologic: Negative  Neurological: Negative  Hematological: Negative  Psychiatric/Behavioral: Negative          Current Outpatient Medications on File Prior to Visit   Medication Sig   • aspirin 81 mg chewable tablet Chew 1 tablet (81 mg total) daily   • Empagliflozin (Jardiance) 25 MG TABS Take 1 tablet (25 mg total) by mouth every morning   • gabapentin (NEURONTIN) 600 MG tablet TAKE 2 TABLETS(1200 MG) BY MOUTH DAILY AT BEDTIME   • lisinopril (ZESTRIL) 20 mg tablet Take 1 tablet (20 mg total) by mouth daily   • metFORMIN (GLUCOPHAGE) 1000 MG tablet TAKE 1 TABLET(1000 MG) BY MOUTH TWICE DAILY WITH MEALS   • Semaglutide 7 MG TABS Take 7 mg by mouth daily   • tadalafil (CIALIS) 20 MG tablet Take 1 PO as needed about 1 hour before sex  • Testosterone 12 5 MG/ACT (1%) GEL Place 2 actuation on the skin in the morning   • [DISCONTINUED] amLODIPine (NORVASC) 10 mg tablet Take 1 tablet (10 mg total) by mouth daily   • [DISCONTINUED] atorvastatin (LIPITOR) 20 mg tablet Take 1 tablet (20 mg total) by mouth every evening   • [DISCONTINUED] glipiZIDE (GLUCOTROL) 10 mg tablet TAKE 1 TABLET BY MOUTH 2 TIMES A DAY BEFORE MEALS  Objective     A1C 7 6  PSA 0 2  Last 0 4  Total cholesterol 135, LDL 66, HDL 35, triglycerides 168  Sodium 137, potassium 4 4, calcium 10 3  Blood sugar 127  Creatinine 0 94, GFR 91  AST 24, ALT 48  /80 (BP Location: Left arm, Patient Position: Sitting, Cuff Size: Standard)   Pulse 90   Temp 99 °F (37 2 °C) (Tympanic)   Resp 20   Ht 5' 9" (1 753 m)   Wt 102 kg (224 lb)   SpO2 99%   BMI 33 08 kg/m²     Physical Exam  Vitals and nursing note reviewed  Constitutional:       General: He is not in acute distress  Appearance: He is well-developed  He is not diaphoretic  HENT:      Head: Normocephalic and atraumatic  Right Ear: Hearing, tympanic membrane, ear canal and external ear normal       Left Ear: Hearing, tympanic membrane, ear canal and external ear normal       Nose: Nose normal       Right Sinus: No maxillary sinus tenderness or frontal sinus tenderness  Left Sinus: No maxillary sinus tenderness or frontal sinus tenderness  Mouth/Throat:      Pharynx: Uvula midline  No oropharyngeal exudate  Eyes:      General: Lids are everted, no foreign bodies appreciated  Conjunctiva/sclera: Conjunctivae normal       Pupils: Pupils are equal, round, and reactive to light  Neck:      Thyroid: No thyromegaly        Vascular: No carotid bruit  Trachea: No tracheal deviation  Cardiovascular:      Rate and Rhythm: Normal rate and regular rhythm  Pulses:           Carotid pulses are 2+ on the right side and 2+ on the left side  Heart sounds: Normal heart sounds  No murmur heard  No friction rub  No gallop  Pulmonary:      Effort: Pulmonary effort is normal  No respiratory distress  Breath sounds: Normal breath sounds  No wheezing or rales  Abdominal:      General: Bowel sounds are normal  There is no distension  Palpations: Abdomen is soft  Tenderness: There is no abdominal tenderness  Musculoskeletal:      Cervical back: Normal range of motion and neck supple  Lymphadenopathy:      Cervical: No cervical adenopathy  Skin:     General: Skin is warm and dry  Neurological:      Mental Status: He is alert and oriented to person, place, and time  Coordination: Coordination normal    Psychiatric:         Behavior: Behavior normal          Thought Content: Thought content normal          Judgment: Judgment normal        Tegan Wilson MD     TCM Call     Date and time call was made  12/1/2022 12:47 PM    Hospital care reviewed  Records reviewed    [place]  [facility]    [place]  [place]/22    Date of discharge  11/30/22    Diagnosis  Dizziness    Disposition  Home    Current Symptoms  None      TCM Call     Post hospital issues  None    Should patient be enrolled in anticoag monitoring? No    Scheduled for follow up?   Yes    Did you obtain your prescribed medications  Yes    Do you need help managing your prescriptions or medications  No    Is transportation to your appointment needed  No    I have advised the patient to call PCP with any new or worsening symptoms  Rosalea Fothergill RMA    Living Arrangements  Family members

## 2022-12-07 NOTE — ASSESSMENT & PLAN NOTE
Patient is experiencing some symptoms of hypotension  This based on at least 2 episodes where he would be sitting or bending over and then coming back up and feeling extremely lightheaded and dizzy  In the hospital, they did start him on 2 blood pressure medications at the same time at reasonable doses  With that, I would recommend that we decrease the amlodipine to 5 mg  Certainly, I would continue with hydration, and the MRI as recommended by hospitalist on discharge

## 2022-12-07 NOTE — ASSESSMENT & PLAN NOTE
Blood pressure today clearly is not significantly elevated, though it is slightly elevated  Since he just had medications adjusted at the end of November, it is unreasonable for me to make significant changes in his blood pressure medication, with the exception of decreasing the amlodipine due to orthostatic symptoms

## 2022-12-09 ENCOUNTER — TELEMEDICINE (OUTPATIENT)
Dept: DIABETES SERVICES | Facility: CLINIC | Age: 52
End: 2022-12-09

## 2022-12-09 DIAGNOSIS — E11.42 TYPE 2 DIABETES MELLITUS WITH DIABETIC POLYNEUROPATHY, WITHOUT LONG-TERM CURRENT USE OF INSULIN (HCC): Primary | ICD-10-CM

## 2022-12-09 NOTE — PROGRESS NOTES
Virtual Regular Visit    Verification of patient location:    Patient is located in the following state in which I hold an active license [place]      Assessment/Plan:    Problem List Items Addressed This Visit    None           Reason for visit is   Chief Complaint   Patient presents with   • Diabetes Type 2   • Virtual Regular Visit        Encounter provider [address] [place] [address]    Provider located at [address] [place] [id number]  [phone]      Recent Visits  Date Type Provider Dept   12/07/22 [employer]  [facility]   12/07/22 Office Visit Tara Baez  [address] Primary Care   12/05/22 Telephone Tara Baez  [address] Primary Care   12/02/22 Telemedicine [address] [place] [address] Pg Diabetic Education Honey Garcia recent visits within past 7 days and meeting all other requirements  Today's Visits  Date Type Provider Dept   12/09/22 Telemedicine [address] [place] [address] Pg Diabetic Education Honey Garcia today's visits and meeting all other requirements  Future Appointments  No visits were found meeting these conditions  Showing future appointments within next 150 days and meeting all other requirements       The patient was identified by name and date of birth  Benoit Vergara  was informed that this is a telemedicine visit and that the visit is being conducted through the [employer]  He agrees to proceed     My office door was closed  No one else was in the room  He acknowledged consent and understanding of privacy and security of the video platform  The patient has agreed to participate and understands they can discontinue the visit at any time  Patient is aware this is a billable service  Subjective  Benoit Vergara  is a 45 y o  male with Type 2 Diabetes Mellitus  Bill MESA     Past Medical History:   Diagnosis Date   • Diabetes mellitus ([address] )    • Hernia of abdominal cavity    • Hypertension    • Ulcer of esophagus        Past Surgical History:   Procedure Laterality Date   • CHOLECYSTECTOMY     • VASECTOMY         Current Outpatient Medications   Medication Sig Dispense Refill   • amLODIPine (NORVASC) 10 mg tablet Take 0 5 tablets (5 mg total) by mouth daily 15 tablet 0   • aspirin 81 mg chewable tablet Chew 1 tablet (81 mg total) daily 30 tablet 0   • atorvastatin (LIPITOR) 20 mg tablet Take 1 tablet (20 mg total) by mouth every evening 30 tablet 5   • Empagliflozin (Jardiance) 25 MG TABS Take 1 tablet (25 mg total) by mouth every morning 90 tablet 1   • gabapentin (NEURONTIN) 600 MG tablet TAKE 2 TABLETS(1200 MG) BY MOUTH DAILY AT BEDTIME 60 tablet 5   • glipiZIDE (GLUCOTROL) 5 mg tablet Take 1 tablet (5 mg total) by mouth 2 (two) times a day before meals 60 tablet 5   • lisinopril (ZESTRIL) 20 mg tablet Take 1 tablet (20 mg total) by mouth daily 30 tablet 0   • metFORMIN (GLUCOPHAGE) 1000 MG tablet TAKE 1 TABLET(1000 MG) BY MOUTH TWICE DAILY WITH MEALS 60 tablet 5   • Semaglutide 7 MG TABS Take 7 mg by mouth daily     • tadalafil (CIALIS) 20 MG tablet Take 1 PO as needed about 1 hour before sex  8 tablet 5   • Testosterone 12 5 MG/ACT (1%) GEL Place 2 actuation on the skin in the morning 75 g 0     No current facility-administered medications for this visit  No Known Allergies    Review of Systems    Video Exam    There were no vitals filed for this visit  Physical Exam     I spent 90 minutes directly with the patient during this visit     Living Well with Diabetes Group Class #3    Bin Quijano  attended the Living Well with Diabetes Group Class #3  During class, Pancho Walton was instructed on the following topics: Oral and injectable medications, short term complications of diabetes, long term complications of diabetes, prevention of complications, foot care, sick day management, stress management, and traveling with diabetes   Pancho Walton participated in group activities  Eliza Quijano will follow up with class #4  Will call with any questions or concerns prior to next session  The patient's history was reviewed and updated as appropriate: allergies, current medications  Lab Results   Component Value Date    HGBA1C 7 6 (H) 12/06/2022       Diabetes Education Record  Eliza Quijano was provided Living Well with Diabetes Class #3 book      Patient response to instruction    Comprehensiongood  Motivationgood  Expected Compliancegood    Begin Time: 9:30AM  End Time: 11:00AM  Referring Provider: Dr Gualberto Law    Thank you for referring your patient to [address] [place] [address], it was a pleasure working with them today  Please feel free to call with any questions or concerns      Geoffrey Abdul  [address] [place] [id number] [phone]

## 2022-12-14 ENCOUNTER — TELEMEDICINE (OUTPATIENT)
Dept: DIABETES SERVICES | Facility: CLINIC | Age: 52
End: 2022-12-14

## 2022-12-14 DIAGNOSIS — E11.42 TYPE 2 DIABETES MELLITUS WITH DIABETIC POLYNEUROPATHY, WITHOUT LONG-TERM CURRENT USE OF INSULIN (HCC): Primary | ICD-10-CM

## 2022-12-14 NOTE — PROGRESS NOTES
Virtual Regular Visit    Verification of patient location:    Patient is located in the following state in which [place] an [place] license [place]      Assessment/Plan:    Problem List Items Addressed This Visit    None           Reason for visit is   Chief Complaint   Patient presents with   • Diabetes Type 2   • Virtual Regular Visit        Encounter provider [address] [place] [address]    Provider located at [address] [place] [phone]  [phone]      Recent Visits  Date Type Provider Dept   12/09/22 Telemedicine [address] [place] [address] Pg Diabetic Education Clau Massa   12/07/22 Telemedicine Jenna Wolfe Reji Pg Diabetic Education [facility]   12/07/22 Office Visit Pal Car MD [address] [place] [address] Primary Care   Showing recent visits within past 7 days and meeting all other requirements  Today's Visits  Date Type Provider Dept   12/14/22 Telemedicine [address] [place] [address] Pg Diabetic Education Cielo Snell today's visits and meeting all other requirements  Future Appointments  No visits were found meeting these conditions  Showing future appointments within next 150 days and meeting all other requirements       The patient was identified by name and date of birth  Celestino Santiago  was informed that this is a telemedicine visit and that the visit is being conducted through the [facility]  He agrees to proceed     My office door was closed  No one else was in the room  He acknowledged consent and understanding of privacy and security of the video platform  The patient has agreed to participate and understands they can discontinue the visit at any time  Patient is aware this is a billable service  Subjective  Celestino Santiago  is a 45 y o  male with Type 2 Diabetes Mellitus  Damion MESA     Past Medical History:   Diagnosis Date   • Diabetes mellitus ([address] )    • Hernia of abdominal cavity    • Hypertension    • Ulcer of esophagus Past Surgical History:   Procedure Laterality Date   • CHOLECYSTECTOMY     • VASECTOMY         Current Outpatient Medications   Medication Sig Dispense Refill   • amLODIPine (NORVASC) 10 mg tablet Take 0 5 tablets (5 mg total) by mouth daily 15 tablet 0   • aspirin 81 mg chewable tablet Chew 1 tablet (81 mg total) daily 30 tablet 0   • atorvastatin (LIPITOR) 20 mg tablet Take 1 tablet (20 mg total) by mouth every evening 30 tablet 5   • Empagliflozin (Jardiance) 25 MG TABS Take 1 tablet (25 mg total) by mouth every morning 90 tablet 1   • gabapentin (NEURONTIN) 600 MG tablet TAKE 2 TABLETS(1200 MG) BY MOUTH DAILY AT BEDTIME 60 tablet 5   • glipiZIDE (GLUCOTROL) 5 mg tablet Take 1 tablet (5 mg total) by mouth 2 (two) times a day before meals 60 tablet 5   • lisinopril (ZESTRIL) 20 mg tablet Take 1 tablet (20 mg total) by mouth daily 30 tablet 0   • metFORMIN (GLUCOPHAGE) 1000 MG tablet TAKE 1 TABLET(1000 MG) BY MOUTH TWICE DAILY WITH MEALS 60 tablet 5   • Semaglutide 7 MG TABS Take 7 mg by mouth daily     • tadalafil (CIALIS) 20 MG tablet Take 1 PO as needed about 1 hour before sex  8 tablet 5   • Testosterone 12 5 MG/ACT (1%) GEL Place 2 actuation on the skin in the morning 75 g 0     No current facility-administered medications for this visit  No Known Allergies    Review of Systems    Video Exam    There were no vitals filed for this visit  Physical Exam     I spent 90 minutes directly with the patient during this visit    Living Well with Diabetes Group Class #4    Black Sears  attended the Living Well with Diabetes Group Class #4  During class, Rivka Cherry was instructed on the following topics: Types of cholesterol, dietary sources of cholesterol, types of fat, types of fiber, reading food labels- in depth, healthy choices when dining out  Rivka Cherry participated in group activities of reading labels together and completed the dining out activity       Rivka Cherry will follow up with class #5 or additional DSMT/MNT as desired  Will call with any questions or concerns prior to next session  The patient's history was reviewed and updated as appropriate: allergies, current medications  Lab Results   Component Value Date    HGBA1C 7 6 (H) 12/06/2022       Diabetes Education Record  Rosa M Alejo was provided Living Well with Diabetes Class #4 book      Patient response to instruction    Comprehensiongood  Motivationgood  Expected Compliancegood    Begin Time: 9:30AM  End Time: 11:00AM  Referring Provider: Dr Erick Boothe    Thank you for referring your patient to [address] [place] [address], it was a pleasure working with them today  Please feel free to call with any questions or concerns      Humphrey Jordan  [address] [place] [id number] [phone]

## 2022-12-15 ENCOUNTER — TELEPHONE (OUTPATIENT)
Dept: NEUROLOGY | Facility: CLINIC | Age: 52
End: 2022-12-15

## 2022-12-15 NOTE — TELEPHONE ENCOUNTER
SCHEDULED: Wed, 1/11/23 at 9:00am w/ Paras Jordan in [place]  Called and spoke w/ patient  Scheduled HFU 6 weeks from d/c  Patient accepted date/time         HFU/SLA/DIZZINESS        NOTES: Norah Butler Jr  will need follow up in in 6 weeks with neurovascular attending or advance practitioner  He will not require outpatient neurological testing

## 2022-12-29 DIAGNOSIS — I10 HYPERTENSION: ICD-10-CM

## 2022-12-29 DIAGNOSIS — E78.2 MIXED HYPERLIPIDEMIA: ICD-10-CM

## 2022-12-29 RX ORDER — ATORVASTATIN CALCIUM 20 MG/1
20 TABLET, FILM COATED ORAL EVERY EVENING
Qty: 30 TABLET | Refills: 5 | Status: SHIPPED | OUTPATIENT
Start: 2022-12-29 | End: 2023-01-05 | Stop reason: SDUPTHER

## 2022-12-29 RX ORDER — LISINOPRIL 20 MG/1
20 TABLET ORAL DAILY
Qty: 30 TABLET | Refills: 0 | Status: SHIPPED | OUTPATIENT
Start: 2022-12-29 | End: 2023-01-05 | Stop reason: SDUPTHER

## 2022-12-29 NOTE — TELEPHONE ENCOUNTER
Patient called into the office requesting a refill on the medication Atorvastatin and Lisinopril  Patient would like it to be sent to [facility] on [address]   Thank You

## 2022-12-30 DIAGNOSIS — I16.0 HYPERTENSIVE URGENCY: ICD-10-CM

## 2022-12-30 RX ORDER — AMLODIPINE BESYLATE 10 MG/1
5 TABLET ORAL DAILY
Qty: 15 TABLET | Refills: 0 | Status: SHIPPED | OUTPATIENT
Start: 2022-12-30 | End: 2023-01-05 | Stop reason: SDUPTHER

## 2023-01-04 ENCOUNTER — TELEPHONE (OUTPATIENT)
Dept: FAMILY MEDICINE CLINIC | Facility: CLINIC | Age: 53
End: 2023-01-04

## 2023-01-04 DIAGNOSIS — G63 POLYNEUROPATHY ASSOCIATED WITH UNDERLYING DISEASE (HCC): ICD-10-CM

## 2023-01-04 DIAGNOSIS — E11.42 TYPE 2 DIABETES MELLITUS WITH DIABETIC POLYNEUROPATHY, WITHOUT LONG-TERM CURRENT USE OF INSULIN (HCC): ICD-10-CM

## 2023-01-04 RX ORDER — GLIPIZIDE 5 MG/1
5 TABLET ORAL
Qty: 60 TABLET | Refills: 5 | Status: SHIPPED | OUTPATIENT
Start: 2023-01-04 | End: 2023-01-05 | Stop reason: SDUPTHER

## 2023-01-04 RX ORDER — GABAPENTIN 600 MG/1
600 TABLET ORAL 2 TIMES DAILY
Qty: 60 TABLET | Refills: 0 | Status: SHIPPED | OUTPATIENT
Start: 2023-01-04 | End: 2023-01-05 | Stop reason: SDUPTHER

## 2023-01-04 NOTE — TELEPHONE ENCOUNTER
Patient has new insurance and now has prescription coverage as well  Patient will be using [facility]  [facility] is going to send us a form to have us start sending his medications to them, it will be for 90 days  He has 2 weeks left of his Glipizide 5 mg & Gabapentin 600 mg  It can take up to 10-14 business days for everything to transfer on [facility]  He is asking if we can please a small supply of those medications to [facility] on [facility] to hold him over? Patient can be reached at [phone]  Thank you

## 2023-01-05 DIAGNOSIS — G63 POLYNEUROPATHY ASSOCIATED WITH UNDERLYING DISEASE (HCC): ICD-10-CM

## 2023-01-05 DIAGNOSIS — I16.0 HYPERTENSIVE URGENCY: ICD-10-CM

## 2023-01-05 DIAGNOSIS — E11.42 TYPE 2 DIABETES MELLITUS WITH DIABETIC POLYNEUROPATHY, WITHOUT LONG-TERM CURRENT USE OF INSULIN (HCC): ICD-10-CM

## 2023-01-05 DIAGNOSIS — E78.2 MIXED HYPERLIPIDEMIA: ICD-10-CM

## 2023-01-05 DIAGNOSIS — I10 HYPERTENSION: ICD-10-CM

## 2023-01-05 RX ORDER — LISINOPRIL 20 MG/1
20 TABLET ORAL DAILY
Qty: 90 TABLET | Refills: 1 | Status: SHIPPED | OUTPATIENT
Start: 2023-01-05 | End: 2023-02-04

## 2023-01-05 RX ORDER — AMLODIPINE BESYLATE 10 MG/1
5 TABLET ORAL DAILY
Qty: 90 TABLET | Refills: 1 | Status: SHIPPED | OUTPATIENT
Start: 2023-01-05 | End: 2023-02-04

## 2023-01-05 RX ORDER — ATORVASTATIN CALCIUM 20 MG/1
20 TABLET, FILM COATED ORAL EVERY EVENING
Qty: 90 TABLET | Refills: 1 | Status: SHIPPED | OUTPATIENT
Start: 2023-01-05 | End: 2023-02-04

## 2023-01-05 RX ORDER — GABAPENTIN 600 MG/1
600 TABLET ORAL 2 TIMES DAILY
Qty: 180 TABLET | Refills: 1 | Status: SHIPPED | OUTPATIENT
Start: 2023-01-05

## 2023-01-05 RX ORDER — GLIPIZIDE 5 MG/1
5 TABLET ORAL
Qty: 180 TABLET | Refills: 1 | Status: SHIPPED | OUTPATIENT
Start: 2023-01-05

## 2023-01-06 ENCOUNTER — TELEPHONE (OUTPATIENT)
Dept: NEUROLOGY | Facility: CLINIC | Age: 53
End: 2023-01-06

## 2023-01-18 DIAGNOSIS — E11.42 TYPE 2 DIABETES MELLITUS WITH DIABETIC POLYNEUROPATHY, WITHOUT LONG-TERM CURRENT USE OF INSULIN (HCC): ICD-10-CM

## 2023-01-25 DIAGNOSIS — E11.42 TYPE 2 DIABETES MELLITUS WITH DIABETIC POLYNEUROPATHY, WITHOUT LONG-TERM CURRENT USE OF INSULIN (HCC): Primary | ICD-10-CM

## 2023-02-10 ENCOUNTER — HOSPITAL ENCOUNTER (OUTPATIENT)
Dept: MRI IMAGING | Facility: HOSPITAL | Age: 53
Discharge: HOME | End: 2023-02-10

## 2023-02-10 DIAGNOSIS — I16.0 HYPERTENSIVE URGENCY: ICD-10-CM

## 2023-02-10 DIAGNOSIS — R42 DIZZINESS: ICD-10-CM

## 2023-02-10 RX ADMIN — GADOBUTROL 10 ML: 604.72 INJECTION INTRAVENOUS at 07:57

## 2023-03-08 ENCOUNTER — RA CDI HCC (OUTPATIENT)
Dept: OTHER | Facility: HOSPITAL | Age: 53
End: 2023-03-08

## 2023-03-08 NOTE — PROGRESS NOTES
[facility]  coding opportunities          Chart Reviewed number of suggestions sent to Provider: 2   e11 65  E11 40    This is a reminder to address ALL HCC (risk adjustment) codes as found on active problem list for 2023 as patient scores reset to zero AZEEM    Patients Insurance        Commercial Insurance: [address] [place] [address]

## 2023-03-09 ENCOUNTER — OFFICE VISIT (OUTPATIENT)
Dept: URGENT CARE | Facility: MEDICAL CENTER | Age: 53
End: 2023-03-09

## 2023-03-09 VITALS
DIASTOLIC BLOOD PRESSURE: 76 MMHG | HEART RATE: 82 BPM | TEMPERATURE: 96.7 F | OXYGEN SATURATION: 98 % | SYSTOLIC BLOOD PRESSURE: 130 MMHG | RESPIRATION RATE: 18 BRPM

## 2023-03-09 DIAGNOSIS — S39.012A STRAIN OF MUSCLE, FASCIA AND TENDON OF LOWER BACK, INITIAL ENCOUNTER: ICD-10-CM

## 2023-03-09 DIAGNOSIS — S16.1XXA STRAIN OF MUSCLE, FASCIA AND TENDON AT NECK LEVEL, INITIAL ENCOUNTER: ICD-10-CM

## 2023-03-09 DIAGNOSIS — M79.605 PAIN OF LEFT LOWER EXTREMITY: ICD-10-CM
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
## 2023-03-09 RX ORDER — METHOCARBAMOL 750 MG/1
750 TABLET, FILM COATED ORAL EVERY 6 HOURS PRN
Qty: 20 TABLET | Refills: 0 | Status: SHIPPED | OUTPATIENT
Start: 2023-03-09 | End: 2023-03-15

## 2023-03-09 NOTE — LETTER
March 9, 2023     Patient: Mona Singleton  YOB: 1970   Date of Visit: 3/9/2023       To Whom it May Concern: Radha Lopez was seen in my clinic on 3/9/2023  He may return to work on 3/13/2023  If you have any questions or concerns, please don't hesitate to call           Sincerely,          Evi Gomez PA-C        CC: No Recipients
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
## 2023-03-09 NOTE — PATIENT INSTRUCTIONS
Prescribed muscle relaxant - take as directed  May also take OTC pain medications such as Tylenol, NSAIDs  Utilize heat to the area  Gentle stretching of neck and low back  Follow up PCP in 3-5 days  Neck Exercises   WHAT YOU NEED TO KNOW:   What do I need to know about neck exercises? Neck exercises help reduce neck pain, and improve neck movement and strength  Neck exercises also help prevent long-term neck problems  What do I need to know about neck exercise safety? Move slowly, gently, and smoothly  Avoid fast or jerky motions  Stand and sit the way your healthcare provider shows you  Good posture may reduce your neck pain  Check your posture often, even when you are not doing your neck exercises  Follow the exercise program recommended by your healthcare provider  He or she will tell you which exercises are best for your condition  He or she will also tell you how many repetitions to do and how often you should do the exercises  How do I perform neck exercises safely? Exercise position:  You may sit or stand while you do neck exercises  Face forward  Your shoulders should be straight and relaxed, with a good posture  Head tilts, forward and back:  Gently bow your head and try to touch your chin to your chest  Your healthcare provider may tell you to push on the back of your neck to help bow your head  Raise your chin back to the starting position  Tilt your head back as far as possible so you are looking up at the ceiling  Your healthcare provider may tell you to lift your chin to help tilt your head back  Return your head to the starting position  Head tilts, side to side:  Tilt your head, bringing your ear toward your shoulder  Then tilt your head toward the other shoulder  Head turns:  Turn your head to look over your shoulder  Tilt your chin down and try to touch it to your shoulder  Do not raise your shoulder to your chin  Face forward again   Do the same on the other side  Head rolls:  Slowly bring your chin toward your chest  Next, roll your head to the right  Your ear should be positioned over your shoulder  Hold this position for 5 seconds  Roll your head back toward your chest and to the left into the same position  Hold for 5 seconds  Gently roll your head back and around in a clockwise [place] 3 times  Next, move your head in the reverse direction (counterclockwise) in a [place] 3 times  Do not shrug your shoulders upwards while you do this exercise  When should I call my doctor? Your pain does not get better, or gets worse  You have questions or concerns about your condition, care, or exercise program     CARE AGREEMENT:   You have the right to help plan your care  Learn about your health condition and how it may be treated  Discuss treatment options with your healthcare providers to decide what care you want to receive  You always have the right to refuse treatment  The above information is an  only  It is not intended as medical advice for individual conditions or treatments  Talk to your doctor, nurse or pharmacist before following any medical regimen to see if it is safe and effective for you  © Copyright [place] March 2022 Information is for End User's use only and may not be sold, redistributed or otherwise used for commercial purposes  Lower Back Exercises   AMBULATORY CARE:   Lower back exercises  help heal and strengthen your back muscles to prevent another injury  Ask your healthcare provider if you need to see a physical therapist for more advanced exercises  Seek care immediately if:   You have severe pain that prevents you from moving  Call your doctor if:   Your pain becomes worse  You have new pain  You have questions or concerns about your condition or care  Do lower back exercises safely:   Do the exercises on a mat or firm surface (not on a bed)    A firm surface will support your spine and prevent low back pain  Move slowly and smoothly  Avoid fast or jerky motions  Breathe normally  Do not hold your breath  Stop if you feel pain  It is normal to feel some discomfort at first, but you should not feel pain  Regular exercise will help decrease your discomfort over time  Lower back exercises: Your healthcare provider may recommend that you do back exercises 10 to 30 minutes each day  He or she may also recommend that you do exercises 1 to 3 times each day  Ask your provider which exercises are best for you and how often to do them  Ankle pumps:  Lie on your back  Move your foot up (with your toes pointing toward your head)  Then, move your foot down (with your toes pointing away from you)  Repeat this exercise 10 times on each side  Heel slides:  Lie on your back  Slowly bend one leg and then straighten it  Next, bend the other leg and then straighten it  Repeat 10 times on each side  Pelvic tilt:  Lie on your back with your knees bent and feet flat on the floor  Place your arms in a relaxed position beside your body  Tighten the muscles of your abdomen and flatten your back against the floor  Hold for 5 seconds  Repeat 5 times  Back stretch:  Lie on your back with your hands behind your head  Bend your knees and turn the lower half of your body to one side  Hold this position for 10 seconds  Repeat 3 times on each side  Straight leg raises:  Lie on your back with one leg straight  Bend the other knee  Tighten your abdomen and then slowly lift the straight leg up about 6 to 12 inches off the floor  Hold for 1 to 5 seconds  Lower your leg slowly  Repeat 10 times on each leg  Knee-to-chest:  Lie on your back with your knees bent and feet flat on the floor  Pull one of your knees toward your chest and hold it there for 5 seconds  Return your leg to the starting position  Lift the other knee toward your chest and hold for 5 seconds  Do this 5 times on each side  Cat and camel:  Place your hands and knees on the floor  Arch your back upward toward the ceiling and lower your head  Round out your spine as much as you can  Hold for 5 seconds  Lift your head upward and push your chest downward toward the floor  Hold for 5 seconds  Do 3 sets or as directed  Wall squats:  Stand with your back against a wall  Tighten the muscles of your abdomen  Slowly lower your body until your knees are bent at a 45 degree angle  Hold this position for 5 seconds  Slowly move back up to a standing position  Repeat 10 times  Curl up:  Lie on your back with your knees bent and feet flat on the floor  Place your hands, palms down, underneath the curve in your lower back  Next, with your elbows on the floor, lift your shoulders and chest 2 to 3 inches  Keep your head in line with your shoulders  Hold this position for 5 seconds  When you can do this exercise without pain for 10 to 15 seconds, you may add a rotation  While your shoulders and chest are lifted off the ground, turn slightly to the left and hold  Repeat on the other side  Bird dog:  Place your hands and knees on the floor  Keep your wrists directly below your shoulders and your knees directly below your hips  Pull your belly button in toward your spine  Do not flatten or arch your back  Tighten your abdominal muscles  Raise one arm straight out so that it is aligned with your head  Next, raise the leg opposite your arm  Hold this position for 15 seconds  Lower your arm and leg slowly and change sides  Do 5 sets  Follow up with your doctor as directed:  Write down your questions so you remember to ask them during your visits  © Copyright Dory Manner 2022 Information is for End User's use only and may not be sold, redistributed or otherwise used for commercial purposes  The above information is an  only  It is not intended as medical advice for individual conditions or treatments   Talk to your doctor, nurse or pharmacist before following any medical regimen to see if it is safe and effective for you

## 2023-03-10 ENCOUNTER — APPOINTMENT (OUTPATIENT)
Dept: LAB | Facility: MEDICAL CENTER | Age: 53
End: 2023-03-10

## 2023-03-10 DIAGNOSIS — E11.42 TYPE 2 DIABETES MELLITUS WITH DIABETIC POLYNEUROPATHY, WITHOUT LONG-TERM CURRENT USE OF INSULIN (HCC): ICD-10-CM

## 2023-03-10 LAB
ALBUMIN SERPL BCP-MCNC: 3.9 G/DL (ref 3.5–5)
ALP SERPL-CCNC: 70 U/L (ref 46–116)
ALT SERPL W P-5'-P-CCNC: 27 U/L (ref 12–78)
ANION GAP SERPL CALCULATED.3IONS-SCNC: 8 MMOL/L (ref 4–13)
AST SERPL W P-5'-P-CCNC: 12 U/L (ref 5–45)
BILIRUB SERPL-MCNC: 0.47 MG/DL (ref 0.2–1)
BUN SERPL-MCNC: 15 MG/DL (ref 5–25)
CALCIUM SERPL-MCNC: 9.4 MG/DL (ref 8.3–10.1)
CHLORIDE SERPL-SCNC: 105 MMOL/L (ref 96–108)
CO2 SERPL-SCNC: 26 MMOL/L (ref 21–32)
CREAT SERPL-MCNC: 0.78 MG/DL (ref 0.6–1.3)
CREAT UR-MCNC: 111 MG/DL
EST. AVERAGE GLUCOSE BLD GHB EST-MCNC: 126 MG/DL
GFR SERPL CREATININE-BSD FRML MDRD: 102 ML/MIN/1.73SQ M
GLUCOSE P FAST SERPL-MCNC: 127 MG/DL (ref 65–99)
HBA1C MFR BLD: 6 %
MICROALBUMIN UR-MCNC: 13.1 MG/L (ref 0–20)
MICROALBUMIN/CREAT 24H UR: 12 MG/G CREATININE (ref 0–30)
POTASSIUM SERPL-SCNC: 3.8 MMOL/L (ref 3.5–5.3)
PROT SERPL-MCNC: 7.1 G/DL (ref 6.4–8.4)
SODIUM SERPL-SCNC: 139 MMOL/L (ref 135–147)

## 2023-03-15 ENCOUNTER — OFFICE VISIT (OUTPATIENT)
Dept: FAMILY MEDICINE CLINIC | Facility: CLINIC | Age: 53
End: 2023-03-15

## 2023-03-15 VITALS
TEMPERATURE: 98.5 F | DIASTOLIC BLOOD PRESSURE: 80 MMHG | HEIGHT: 69 IN | SYSTOLIC BLOOD PRESSURE: 130 MMHG | WEIGHT: 223 LBS | HEART RATE: 101 BPM | OXYGEN SATURATION: 98 % | RESPIRATION RATE: 18 BRPM | BODY MASS INDEX: 33.03 KG/M2

## 2023-03-15 DIAGNOSIS — E78.2 MIXED HYPERLIPIDEMIA: ICD-10-CM

## 2023-03-15 DIAGNOSIS — G63 POLYNEUROPATHY ASSOCIATED WITH UNDERLYING DISEASE (HCC): ICD-10-CM

## 2023-03-15 DIAGNOSIS — E66.09 CLASS 1 OBESITY DUE TO EXCESS CALORIES WITH SERIOUS COMORBIDITY AND BODY MASS INDEX (BMI) OF 32.0 TO 32.9 IN ADULT: ICD-10-CM

## 2023-03-15 DIAGNOSIS — I10 PRIMARY HYPERTENSION: Primary | ICD-10-CM

## 2023-03-15 DIAGNOSIS — R00.2 PALPITATIONS: ICD-10-CM

## 2023-03-15 DIAGNOSIS — E11.42 TYPE 2 DIABETES MELLITUS WITH DIABETIC POLYNEUROPATHY, WITHOUT LONG-TERM CURRENT USE OF INSULIN (HCC): ICD-10-CM

## 2023-03-15 PROBLEM — E66.9 OBESITY: Status: ACTIVE | Noted: 2023-03-15

## 2023-03-15 RX ORDER — GLIPIZIDE 5 MG/1
5 TABLET, FILM COATED, EXTENDED RELEASE ORAL DAILY
Qty: 90 TABLET | Refills: 3 | Status: SHIPPED | OUTPATIENT
Start: 2023-03-15

## 2023-03-15 RX ORDER — AMLODIPINE BESYLATE 5 MG/1
5 TABLET ORAL DAILY
Qty: 90 TABLET | Refills: 3 | Status: SHIPPED | OUTPATIENT
Start: 2023-03-15 | End: 2023-04-14

## 2023-03-15 NOTE — ASSESSMENT & PLAN NOTE
Patient reports his home weight is approximately 213  In the office, he has slightly higher, though he is wearing clothing  Limit carbs, increase exercise, general recommendations as I would recommend for diabetes as well

## 2023-03-15 NOTE — ASSESSMENT & PLAN NOTE
Lab Results   Component Value Date    HGBA1C 6 0 (H) 03/10/2023   Patient's A1c has come down significantly with recent changes  Doing extremely well  Continue on current medications with the exception of changing the Glucotrol to XL, 5 mg 1 daily  This decreases the sulfonylurea, which decreases the potential for low sugars  Check in 3 months

## 2023-03-15 NOTE — PATIENT INSTRUCTIONS
1  Primary hypertension  Assessment & Plan:  Blood pressure today is excellent  Orders:  -     Comprehensive metabolic panel; Future; Expected date: 06/15/2023  -     amLODIPine (NORVASC) 5 mg tablet; Take 1 tablet (5 mg total) by mouth daily    2  Type 2 diabetes mellitus with diabetic polyneuropathy, without long-term current use of insulin ([facility])  Assessment & Plan:    Lab Results   Component Value Date    HGBA1C 6 0 (H) 03/10/2023   Patient's A1c has come down significantly with recent changes  Doing extremely well  Continue on current medications with the exception of changing the Glucotrol to XL, 5 mg 1 daily  This decreases the sulfonylurea, which decreases the potential for low sugars  Check in 3 months  Orders:  -     Hemoglobin A1C; Future; Expected date: 06/15/2023  -     Comprehensive metabolic panel; Future; Expected date: 06/15/2023  -     glipiZIDE (GLUCOTROL XL) 5 mg 24 hr tablet; Take 1 tablet (5 mg total) by mouth daily    3  Mixed hyperlipidemia  Assessment & Plan:  Patient will be due in approximately 3 months  Follow-up at next visit  Continue atorvastatin  Orders:  -     Comprehensive metabolic panel; Future; Expected date: 06/15/2023    4  Palpitations  Assessment & Plan:  No recent symptoms  5  Class 1 obesity due to excess calories with serious comorbidity and body mass index (BMI) of 32 0 to 32 9 in adult  Assessment & Plan:  Patient reports his home weight is approximately 213  In the office, he has slightly higher, though he is wearing clothing  Limit carbs, increase exercise, general recommendations as I would recommend for diabetes as well  6  Polyneuropathy associated with underlying disease ([facility] [address] )  Assessment & Plan:  Still with some neuropathy issues

## 2023-03-15 NOTE — PROGRESS NOTES
Name: Derick Corea  : 1970      MRN: [id number]  Encounter Provider: Fracisco De Los Santos MD  Encounter Date: 3/15/2023   Encounter department: [facility]    Assessment & Plan     1  Primary hypertension  Assessment & Plan:  Blood pressure today is excellent  Orders:  -     Comprehensive metabolic panel; Future; Expected date: 06/15/2023  -     amLODIPine (NORVASC) 5 mg tablet; Take 1 tablet (5 mg total) by mouth daily    2  Type 2 diabetes mellitus with diabetic polyneuropathy, without long-term current use of insulin ([facility])  Assessment & Plan:    Lab Results   Component Value Date    HGBA1C 6 0 (H) 03/10/2023   Patient's A1c has come down significantly with recent changes  Doing extremely well  Continue on current medications with the exception of changing the Glucotrol to XL, 5 mg 1 daily  This decreases the sulfonylurea, which decreases the potential for low sugars  Check in 3 months  Orders:  -     Hemoglobin A1C; Future; Expected date: 06/15/2023  -     Comprehensive metabolic panel; Future; Expected date: 06/15/2023  -     glipiZIDE (GLUCOTROL XL) 5 mg 24 hr tablet; Take 1 tablet (5 mg total) by mouth daily    3  Mixed hyperlipidemia  Assessment & Plan:  Patient will be due in approximately 3 months  Follow-up at next visit  Continue atorvastatin  Orders:  -     Comprehensive metabolic panel; Future; Expected date: 06/15/2023    4  Palpitations  Assessment & Plan:  No recent symptoms  5  Class 1 obesity due to excess calories with serious comorbidity and body mass index (BMI) of 32 0 to 32 9 in adult  Assessment & Plan:  Patient reports his home weight is approximately 213  In the office, he has slightly higher, though he is wearing clothing  Limit carbs, increase exercise, general recommendations as I would recommend for diabetes as well        6  Polyneuropathy associated with underlying disease ([address] )  Assessment & Plan:  Still with some neuropathy issues  Subjective      Chief Complaint   Patient presents with   • Follow-up     3 months follow up       Patient is here to follow-up on several issues  Hypertension: Patient is currently on amlodipine, 10 mg tablets taking a half a pill a day  He did wonder about switching to 5 mg tablets  Diabetes: Currently on Jardiance 25 mg, glipizide 5 mg twice daily, metformin, Rybelsus  Review of Systems    Current Outpatient Medications on File Prior to Visit   Medication Sig   • aspirin 81 mg chewable tablet Chew 1 tablet (81 mg total) daily   • atorvastatin (LIPITOR) 20 mg tablet Take 1 tablet (20 mg total) by mouth every evening   • Empagliflozin (Jardiance) 25 MG TABS Take 1 tablet (25 mg total) by mouth every morning   • gabapentin (NEURONTIN) 600 MG tablet Take 1 tablet (600 mg total) by mouth 2 (two) times a day   • lisinopril (ZESTRIL) 20 mg tablet Take 1 tablet (20 mg total) by mouth daily   • metFORMIN (GLUCOPHAGE) 1000 MG tablet Take 1 tablet (1,000 mg total) by mouth 2 (two) times a day with meals   • methocarbamol (Robaxin-750) 750 mg tablet Take 1 tablet (750 mg total) by mouth every 6 (six) hours as needed for muscle spasms for up to 5 days   • semaglutide (Rybelsus) 7 MG tablet Take 1 tablet (7 mg total) by mouth daily   • tadalafil (CIALIS) 20 MG tablet Take 1 PO as needed about 1 hour before sex  • Testosterone 12 5 MG/ACT (1%) GEL Place 2 actuation on the skin in the morning   • [DISCONTINUED] amLODIPine (NORVASC) 10 mg tablet Take 0 5 tablets (5 mg total) by mouth daily   • [DISCONTINUED] glipiZIDE (GLUCOTROL) 5 mg tablet Take 1 tablet (5 mg total) by mouth 2 (two) times a day before meals       Objective     Microalbumin to creatinine 12  A1c 6  Sodium 139, potassium 3 8, calcium 9 4  Blood sugar 127  Creatinine 0 78,   AST 12, ALT 27      /80 (BP Location: Left arm, Patient Position: Sitting, Cuff Size: Standard)   Pulse 101   Temp 98 5 °F (36 9 °C) (Tympanic)   Resp 18   Ht 5' 9" (1 753 m)   Wt 101 kg (223 lb)   SpO2 98%   BMI 32 93 kg/m²     Physical Exam  Dipak Johnson MD

## 2023-04-05 ENCOUNTER — TELEPHONE (OUTPATIENT)
Dept: FAMILY MEDICINE CLINIC | Facility: CLINIC | Age: 53
End: 2023-04-05

## 2023-05-08 DIAGNOSIS — I10 HYPERTENSION: ICD-10-CM

## 2023-05-08 RX ORDER — LISINOPRIL 20 MG/1
TABLET ORAL
Qty: 90 TABLET | Refills: 3 | Status: SHIPPED | OUTPATIENT
Start: 2023-05-08

## 2023-06-14 ENCOUNTER — RA CDI HCC (OUTPATIENT)
Dept: OTHER | Facility: HOSPITAL | Age: 53
End: 2023-06-14

## 2023-06-14 NOTE — PROGRESS NOTES
[facility]  coding opportunities       Chart reviewed, no opportunity found: CHART REVIEWED, NO OPPORTUNITY FOUND        Patients Insurance        Commercial Insurance: [employer] [place]

## 2023-06-27 DIAGNOSIS — E11.42 TYPE 2 DIABETES MELLITUS WITH DIABETIC POLYNEUROPATHY, WITHOUT LONG-TERM CURRENT USE OF INSULIN (HCC): ICD-10-CM

## 2023-06-27 DIAGNOSIS — E78.2 MIXED HYPERLIPIDEMIA: ICD-10-CM

## 2023-06-27 RX ORDER — ATORVASTATIN CALCIUM 20 MG/1
TABLET, FILM COATED ORAL
Qty: 90 TABLET | Refills: 3 | Status: SHIPPED | OUTPATIENT
Start: 2023-06-27

## 2023-07-11 ENCOUNTER — VBI (OUTPATIENT)
Dept: ADMINISTRATIVE | Facility: OTHER | Age: 53
End: 2023-07-11

## 2023-08-05 DIAGNOSIS — G63 POLYNEUROPATHY ASSOCIATED WITH UNDERLYING DISEASE (HCC): ICD-10-CM

## 2023-08-06 DIAGNOSIS — G63 POLYNEUROPATHY ASSOCIATED WITH UNDERLYING DISEASE (HCC): ICD-10-CM

## 2023-08-07 RX ORDER — GABAPENTIN 600 MG/1
600 TABLET ORAL 2 TIMES DAILY
Qty: 180 TABLET | Refills: 0 | OUTPATIENT
Start: 2023-08-07

## 2023-08-07 RX ORDER — GABAPENTIN 600 MG/1
600 TABLET ORAL 2 TIMES DAILY
Qty: 180 TABLET | Refills: 3 | Status: SHIPPED | OUTPATIENT
Start: 2023-08-07

## 2023-09-20 ENCOUNTER — APPOINTMENT (OUTPATIENT)
Dept: LAB | Facility: CLINIC | Age: 53
End: 2023-09-20

## 2023-09-20 DIAGNOSIS — E11.42 TYPE 2 DIABETES MELLITUS WITH DIABETIC POLYNEUROPATHY, WITHOUT LONG-TERM CURRENT USE OF INSULIN (HCC): ICD-10-CM

## 2023-09-20 DIAGNOSIS — I10 PRIMARY HYPERTENSION: ICD-10-CM

## 2023-09-20 DIAGNOSIS — E78.2 MIXED HYPERLIPIDEMIA: ICD-10-CM

## 2023-09-20 LAB
ALBUMIN SERPL BCP-MCNC: 4.3 G/DL (ref 3.5–5)
ALP SERPL-CCNC: 75 U/L (ref 34–104)
ALT SERPL W P-5'-P-CCNC: 20 U/L (ref 7–52)
ANION GAP SERPL CALCULATED.3IONS-SCNC: 8 MMOL/L
AST SERPL W P-5'-P-CCNC: 13 U/L (ref 13–39)
BILIRUB SERPL-MCNC: 0.66 MG/DL (ref 0.2–1)
BUN SERPL-MCNC: 18 MG/DL (ref 5–25)
CALCIUM SERPL-MCNC: 9.6 MG/DL (ref 8.4–10.2)
CHLORIDE SERPL-SCNC: 101 MMOL/L (ref 96–108)
CO2 SERPL-SCNC: 28 MMOL/L (ref 21–32)
CREAT SERPL-MCNC: 0.87 MG/DL (ref 0.6–1.3)
EST. AVERAGE GLUCOSE BLD GHB EST-MCNC: 209 MG/DL
GFR SERPL CREATININE-BSD FRML MDRD: 97 ML/MIN/1.73SQ M
GLUCOSE P FAST SERPL-MCNC: 172 MG/DL (ref 65–99)
HBA1C MFR BLD: 8.9 %
POTASSIUM SERPL-SCNC: 4.6 MMOL/L (ref 3.5–5.3)
PROT SERPL-MCNC: 7 G/DL (ref 6.4–8.4)
SODIUM SERPL-SCNC: 137 MMOL/L (ref 135–147)

## 2023-09-20 PROCEDURE — 36415 COLL VENOUS BLD VENIPUNCTURE: CPT

## 2023-09-20 PROCEDURE — 83036 HEMOGLOBIN GLYCOSYLATED A1C: CPT

## 2023-09-20 PROCEDURE — 80053 COMPREHEN METABOLIC PANEL: CPT

## 2023-10-05 ENCOUNTER — OFFICE VISIT (OUTPATIENT)
Dept: FAMILY MEDICINE CLINIC | Facility: CLINIC | Age: 53
End: 2023-10-05
Payer: COMMERCIAL

## 2023-10-05 VITALS
WEIGHT: 227 LBS | BODY MASS INDEX: 33.62 KG/M2 | SYSTOLIC BLOOD PRESSURE: 132 MMHG | HEIGHT: 69 IN | DIASTOLIC BLOOD PRESSURE: 76 MMHG | OXYGEN SATURATION: 95 % | HEART RATE: 90 BPM

## 2023-10-05 DIAGNOSIS — E78.2 MIXED HYPERLIPIDEMIA: ICD-10-CM

## 2023-10-05 DIAGNOSIS — I10 PRIMARY HYPERTENSION: ICD-10-CM

## 2023-10-05 DIAGNOSIS — G63 POLYNEUROPATHY ASSOCIATED WITH UNDERLYING DISEASE (HCC): ICD-10-CM

## 2023-10-05 DIAGNOSIS — N52.8 OTHER MALE ERECTILE DYSFUNCTION: ICD-10-CM

## 2023-10-05 DIAGNOSIS — Z12.11 COLON CANCER SCREENING: ICD-10-CM

## 2023-10-05 DIAGNOSIS — Z12.5 PROSTATE CANCER SCREENING: ICD-10-CM

## 2023-10-05 DIAGNOSIS — Z86.73 HISTORY OF CVA (CEREBROVASCULAR ACCIDENT): ICD-10-CM

## 2023-10-05 DIAGNOSIS — E11.42 TYPE 2 DIABETES MELLITUS WITH DIABETIC POLYNEUROPATHY, WITHOUT LONG-TERM CURRENT USE OF INSULIN (HCC): Primary | ICD-10-CM

## 2023-10-05 LAB
LEFT EYE DIABETIC RETINOPATHY: NORMAL
LEFT EYE IMAGE QUALITY: NORMAL
LEFT EYE MACULAR EDEMA: NORMAL
LEFT EYE OTHER RETINOPATHY: NORMAL
RIGHT EYE DIABETIC RETINOPATHY: NORMAL
RIGHT EYE IMAGE QUALITY: NORMAL
RIGHT EYE MACULAR EDEMA: NORMAL
RIGHT EYE OTHER RETINOPATHY: NORMAL
SEVERITY (EYE EXAM): NORMAL

## 2023-10-05 PROCEDURE — 92250 FUNDUS PHOTOGRAPHY W/I&R: CPT | Performed by: FAMILY MEDICINE

## 2023-10-05 PROCEDURE — 99215 OFFICE O/P EST HI 40 MIN: CPT | Performed by: FAMILY MEDICINE

## 2023-10-05 RX ORDER — SILDENAFIL 100 MG/1
100 TABLET, FILM COATED ORAL DAILY PRN
Qty: 10 TABLET | Refills: 5 | Status: SHIPPED | OUTPATIENT
Start: 2023-10-05

## 2023-10-05 NOTE — PROGRESS NOTES
Name: Henna Naylor. : 1970      MRN: [id number]  Encounter Provider: Stephanie Page MD  Encounter Date: 10/5/2023   Encounter department: [facility]    Assessment & Plan     1. Type 2 diabetes mellitus with diabetic polyneuropathy, without long-term current use of insulin [facility])  Assessment & Plan:    Lab Results   Component Value Date    HGBA1C 8.9 (H) 2023   Patient reports dietary indiscretion significantly lately due to the changes at home with his wife being diagnosed with cancer. He does continue to take his medications, however. Will increase rybelsus. Check in 3 months. Orders:  -     IRIS Diabetic eye exam  -     Albumin / creatinine urine ratio; Future; Expected date: 2024  -     Comprehensive metabolic panel; Future; Expected date: 2024  -     Hemoglobin A1C; Future; Expected date: 2024  -     semaglutide (Rybelsus) 14 MG tablet; Take 1 tablet (14 mg total) by mouth daily    2. Polyneuropathy associated with underlying disease [facility])  Assessment & Plan:  Patient is having significant symptoms of peripheral neuropathy at night. Ibuprofen worked out quite well for him. Reviewed about benefits and risks of ibuprofen, i.e. reflux, bleeding. The benefit is that he is not taking a sedative, and it is working for his symptoms. With that, continue. If he needed anticoagulation for any reason, consider Celebrex. 3. Primary hypertension  Assessment & Plan:  Blood pressure today is reasonable. No changes. Orders:  -     Comprehensive metabolic panel; Future; Expected date: 2024    4. Mixed hyperlipidemia  Assessment & Plan:  Patient is due for lipid panel. His last was quite a bit ago. Ordered. Of note, LFTs on the most recent testing were good. Orders:  -     Comprehensive metabolic panel; Future; Expected date: 2024  -     Lipid Panel with Direct LDL reflex; Future; Expected date: 2024    5.  Other male erectile dysfunction  Assessment & Plan:  Patient is noting that when he takes Cialis he does have some significant issues with regard to reflux and pressure. It is quite uncomfortable. Trial viagra. Because he was not noticing much improvement in symptoms with Cialis, will trial 100 mg tablets of Viagra. Orders:  -     PSA, Total Screen; Future; Expected date: 01/05/2024  -     sildenafil (Viagra) 100 mg tablet; Take 1 tablet (100 mg total) by mouth daily as needed for erectile dysfunction    6. History of CVA (cerebrovascular accident)  Assessment & Plan:  Cerebellar CVA in past. Noted on recent MRI. 7. Prostate cancer screening  -     PSA, Total Screen; Future; Expected date: 01/05/2024    8. Colon cancer screening  Comments:  Patient had old cerebellar CVA in February 23 MRI. This does not represent significant risk for colonoscopy. I would feel he should proceed with the screening  Orders:  -     Ambulatory Referral to Gastroenterology; Future           Subjective      Here to follow up on multiple isues. Noting the feeling of tight/swelling in the feet. Using Motrin at night, 800mg. Helps the sensation. Hyperlipidemia: On Lipitor. Hypertension: Currently on lisinopril 20 mg, amlodipine 5 mg. Patient is also on Jardiance    Diabetes: Currently on Glucotrol XL, 5 mg daily. Also on metformin, [place]. Reviewed A1c. 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 His wife was recently diagnosed with breast cancer. Based on that, he is not paying as much attention to diet as he used to. ED: Patient does use Cialis. Unfortunately, he has developed some significant reflux with discomfort with that. Review of Systems   Constitutional: Negative. HENT: Negative. Eyes: Negative. Respiratory: Negative. Cardiovascular: Negative. Gastrointestinal: Negative. Endocrine: Negative. Genitourinary: Negative. Musculoskeletal: Negative. Skin: Negative. Allergic/Immunologic: Negative. Neurological: Negative. Hematological: Negative. Psychiatric/Behavioral: Negative. Current Outpatient Medications on File Prior to Visit   Medication Sig   • atorvastatin (LIPITOR) 20 mg tablet TAKE 1 TABLET EVERY EVENING   • gabapentin (NEURONTIN) 600 MG tablet TAKE 1 TABLET TWICE A DAY   • glipiZIDE (GLUCOTROL XL) 5 mg 24 hr tablet Take 1 tablet (5 mg total) by mouth daily   • lisinopril (ZESTRIL) 20 mg tablet TAKE 1 TABLET DAILY   • metFORMIN (GLUCOPHAGE) 1000 MG tablet TAKE 1 TABLET TWICE A DAY WITH MEALS   • Testosterone 12.5 MG/ACT (1%) GEL Place 2 actuation on the skin in the morning   • [DISCONTINUED] semaglutide (Rybelsus) 7 MG tablet Take 1 tablet (7 mg total) by mouth daily   • [DISCONTINUED] tadalafil (CIALIS) 20 MG tablet Take 1 PO as needed about 1 hour before sex. • amLODIPine (NORVASC) 5 mg tablet Take 1 tablet (5 mg total) by mouth daily   • aspirin 81 mg chewable tablet Chew 1 tablet (81 mg total) daily   • Empagliflozin (Jardiance) 25 MG TABS Take 1 tablet (25 mg total) by mouth every morning   • methocarbamol (Robaxin-750) 750 mg tablet Take 1 tablet (750 mg total) by mouth every 6 (six) hours as needed for muscle spasms for up to 5 days       Objective     Sodium 137, potassium 4.6, calcium 9.6. Blood sugar 172. Creatinine 0.87, GFR 97. AST 13, ALT 20. A1c 8.9.    /76 (BP Location: Left arm, Patient Position: Sitting, Cuff Size: Large)   Pulse 90   Ht 5' 9" (1.753 m)   Wt 103 kg (227 lb)   SpO2 95%   BMI 33.52 kg/m²     Physical Exam  Vitals and nursing note reviewed. Constitutional:       Appearance: He is well-developed. HENT:      Head: Normocephalic and atraumatic. Cardiovascular:      Rate and Rhythm: Normal rate and regular rhythm. Pulses: no weak pulses          Carotid pulses are 2+ on the right side and 2+ on the left side. Dorsalis pedis pulses are 2+ on the right side and 2+ on the left side. Posterior tibial pulses are 2+ on the right side and 2+ on the left side. Heart sounds: Normal heart sounds. No murmur heard. No friction rub. No gallop. Pulmonary:      Effort: Pulmonary effort is normal. No respiratory distress. Breath sounds: Normal breath sounds. No wheezing or rales. Musculoskeletal:      Cervical back: Normal range of motion and neck supple. Feet:    Feet:      Right foot:      Skin integrity: No ulcer, skin breakdown, erythema, warmth, callus or dry skin. Left foot:      Skin integrity: Dry skin present. No ulcer, skin breakdown, erythema, warmth or callus. Patient's shoes and socks removed. Right Foot/Ankle   Right Foot Inspection  Skin Exam: skin normal and skin intact. No dry skin, no warmth, no callus, no erythema, no maceration, no abnormal color, no pre-ulcer, no ulcer and no callus. Toe Exam: ROM and strength within normal limits. Sensory   Monofilament testing: absent    Vascular  Capillary refills: < 3 seconds  The right DP pulse is 2+. The right PT pulse is 2+. Left Foot/Ankle  Left Foot Inspection  Skin Exam: skin normal, skin intact and dry skin. No warmth, no erythema, no maceration, normal color, no pre-ulcer, no ulcer and no callus. Toe Exam: ROM and strength within normal limits. Sensory   Monofilament testing: absent    Vascular  Capillary refills: < 3 seconds  The left DP pulse is 2+. The left PT pulse is 2+.      Assign Risk Category  No deformity present  Loss of protective sensation  No weak pulses  Risk: 1        I have spent a total time of 50 minutes on 10/05/23 in caring for this patient including Diagnostic results, Prognosis, Risks and benefits of tx options, Instructions for management, Patient and family education, Importance of tx compliance, Risk factor reductions, Impressions, Counseling / Coordination of care, Documenting in the medical record, Reviewing / ordering tests, medicine, procedures   and Obtaining or reviewing history  .       Herb Perez MD

## 2023-10-05 NOTE — PATIENT INSTRUCTIONS
1. Type 2 diabetes mellitus with diabetic polyneuropathy, without long-term current use of insulin [facility])  Assessment & Plan:    Lab Results   Component Value Date    HGBA1C 8.9 (H) 09/20/2023   Patient reports dietary indiscretion significantly lately due to the changes at home with his wife being diagnosed with cancer. He does continue to take his medications, however. Will increase rybelsus. Check in 3 months. Orders:  -     IRIS Diabetic eye exam  -     Albumin / creatinine urine ratio; Future; Expected date: 01/05/2024  -     Comprehensive metabolic panel; Future; Expected date: 01/05/2024  -     Hemoglobin A1C; Future; Expected date: 01/05/2024  -     semaglutide (Rybelsus) 14 MG tablet; Take 1 tablet (14 mg total) by mouth daily    2. Polyneuropathy associated with underlying disease [facility])  Assessment & Plan:  Patient is having significant symptoms of peripheral neuropathy at night. Ibuprofen worked out quite well for him. Reviewed about benefits and risks of ibuprofen, i.e. reflux, bleeding. The benefit is that he is not taking a sedative, and it is working for his symptoms. With that, continue. If he needed anticoagulation for any reason, consider Celebrex. 3. Primary hypertension  Assessment & Plan:  Blood pressure today is reasonable. No changes. Orders:  -     Comprehensive metabolic panel; Future; Expected date: 01/05/2024    4. Mixed hyperlipidemia  Assessment & Plan:  Patient is due for lipid panel. His last was quite a bit ago. Ordered. Of note, LFTs on the most recent testing were good. Orders:  -     Comprehensive metabolic panel; Future; Expected date: 01/05/2024  -     Lipid Panel with Direct LDL reflex; Future; Expected date: 01/05/2024    5. Other male erectile dysfunction  Assessment & Plan:  Patient is noting that when he takes Cialis he does have some significant issues with regard to reflux and pressure. It is quite uncomfortable. Trial viagra.   Because he was not noticing much improvement in symptoms with Cialis, will trial 100 mg tablets of Viagra. Orders:  -     PSA, Total Screen; Future; Expected date: 01/05/2024  -     sildenafil (Viagra) 100 mg tablet; Take 1 tablet (100 mg total) by mouth daily as needed for erectile dysfunction    6. History of CVA (cerebrovascular accident)  Assessment & Plan:  Cerebellar CVA in past. Noted on recent MRI. 7. Prostate cancer screening  -     PSA, Total Screen; Future; Expected date: 01/05/2024    8. Colon cancer screening  Comments:  Patient had old cerebellar CVA in February 23 MRI. This does not represent significant risk for colonoscopy. I would feel he should proceed with the screening  Orders:  -     Ambulatory Referral to Gastroenterology; Future        COVID 19 Instructions    Richard García. was advised to limit contact with others to essential tasks such as getting food, medications, and medical care. Proper handwashing reviewed, and Hand sanitzer when washing is not available. If the patient develops symptoms of COVID 19, the patient should call the office as soon as possible. It is strongly recommended that Flu Vaccinations be obtained. Virtual Visits:  [place]: This works on smart phones (any phone with Internet browsing capability). You should get a text message when the provider is ready to see you. Click on the link in the text message, and the call should start. You will need to type in your name, and allow camera and microphone access. This is HIPPA compliant, and secure. If you have not already done so, get immunized to COVID 19. We are committed to getting you vaccinated as soon as possible and will be closely following CDC and [facility] guidelines as they are released and revised. Please refer to our COVID-19 vaccine webpage for the most up to date information on the vaccine and our distribution efforts.     This site will also have the most up to date recommendations for COVID booster vaccine. [place]    Call [phone] ([phone]), option 7    You can also visit [email or web address].pl to find vaccines in your area. OUR LOCATION:    [facility]  [address], [address], [place], [address] [facility]  Fax: [phone]    Lab services, Rheumatology, and OB/GYN are at this location as well.

## 2023-10-05 NOTE — ASSESSMENT & PLAN NOTE
Lab Results   Component Value Date    HGBA1C 8.9 (H) 09/20/2023   Patient reports dietary indiscretion significantly lately due to the changes at home with his wife being diagnosed with cancer. He does continue to take his medications, however. Will increase rybelsus. Check in 3 months.

## 2023-10-05 NOTE — ASSESSMENT & PLAN NOTE
Patient is having significant symptoms of peripheral neuropathy at night. Ibuprofen worked out quite well for him. Reviewed about benefits and risks of ibuprofen, i.e. reflux, bleeding. The benefit is that he is not taking a sedative, and it is working for his symptoms. With that, continue. If he needed anticoagulation for any reason, consider Celebrex.

## 2023-10-05 NOTE — ASSESSMENT & PLAN NOTE
Patient is due for lipid panel. His last was quite a bit ago. Ordered. Of note, LFTs on the most recent testing were good.

## 2023-10-05 NOTE — ASSESSMENT & PLAN NOTE
Patient is noting that when he takes Cialis he does have some significant issues with regard to reflux and pressure. It is quite uncomfortable. Trial viagra. Because he was not noticing much improvement in symptoms with Cialis, will trial 100 mg tablets of Viagra.

## 2023-10-17 DIAGNOSIS — E11.42 TYPE 2 DIABETES MELLITUS WITH DIABETIC POLYNEUROPATHY, WITHOUT LONG-TERM CURRENT USE OF INSULIN (HCC): ICD-10-CM

## 2023-10-17 RX ORDER — EMPAGLIFLOZIN 25 MG/1
25 TABLET, FILM COATED ORAL EVERY MORNING
Qty: 90 TABLET | Refills: 3 | Status: SHIPPED | OUTPATIENT
Start: 2023-10-17

## 2023-11-01 ENCOUNTER — VBI (OUTPATIENT)
Dept: ADMINISTRATIVE | Facility: OTHER | Age: 53
End: 2023-11-01

## 2024-01-05 ENCOUNTER — RA CDI HCC (OUTPATIENT)
Dept: OTHER | Facility: HOSPITAL | Age: 54
End: 2024-01-05

## 2024-01-05 NOTE — PROGRESS NOTES
HCC coding opportunities       Chart reviewed, no opportunity found: CHART REVIEWED, NO OPPORTUNITY FOUND   This is a reminder to address (resolve/update/assess) ALL HCC (risk adjustment) codes as found on active problem list for 2024 as patient scores reset to zero AZEEM.  Also, just a reminder to please review and assess all other chronic conditions for 2024     Patients Insurance        Commercial Insurance: [employer]

## 2024-02-06 ENCOUNTER — OFFICE VISIT (OUTPATIENT)
Dept: FAMILY MEDICINE CLINIC | Facility: CLINIC | Age: 54
End: 2024-02-06
Payer: COMMERCIAL

## 2024-02-06 ENCOUNTER — LAB (OUTPATIENT)
Dept: LAB | Facility: CLINIC | Age: 54
End: 2024-02-06
Payer: COMMERCIAL

## 2024-02-06 VITALS
DIASTOLIC BLOOD PRESSURE: 70 MMHG | BODY MASS INDEX: 32.88 KG/M2 | HEIGHT: 69 IN | WEIGHT: 222 LBS | SYSTOLIC BLOOD PRESSURE: 130 MMHG | HEART RATE: 104 BPM | OXYGEN SATURATION: 97 %

## 2024-02-06 DIAGNOSIS — M62.838 MUSCLE SPASM: ICD-10-CM

## 2024-02-06 DIAGNOSIS — N52.8 OTHER MALE ERECTILE DYSFUNCTION: ICD-10-CM

## 2024-02-06 DIAGNOSIS — E78.2 MIXED HYPERLIPIDEMIA: ICD-10-CM

## 2024-02-06 DIAGNOSIS — R22.1 LUMP IN NECK: ICD-10-CM

## 2024-02-06 DIAGNOSIS — G63 POLYNEUROPATHY ASSOCIATED WITH UNDERLYING DISEASE (HCC): ICD-10-CM

## 2024-02-06 DIAGNOSIS — E11.42 TYPE 2 DIABETES MELLITUS WITH DIABETIC POLYNEUROPATHY, WITHOUT LONG-TERM CURRENT USE OF INSULIN (HCC): Primary | ICD-10-CM

## 2024-02-06 DIAGNOSIS — I10 PRIMARY HYPERTENSION: ICD-10-CM

## 2024-02-06 DIAGNOSIS — Z12.5 PROSTATE CANCER SCREENING: ICD-10-CM

## 2024-02-06 DIAGNOSIS — K21.9 GASTROESOPHAGEAL REFLUX DISEASE WITHOUT ESOPHAGITIS: ICD-10-CM

## 2024-02-06 DIAGNOSIS — E11.42 TYPE 2 DIABETES MELLITUS WITH DIABETIC POLYNEUROPATHY, WITHOUT LONG-TERM CURRENT USE OF INSULIN (HCC): ICD-10-CM

## 2024-02-06 DIAGNOSIS — E66.09 CLASS 1 OBESITY DUE TO EXCESS CALORIES WITH SERIOUS COMORBIDITY AND BODY MASS INDEX (BMI) OF 32.0 TO 32.9 IN ADULT: ICD-10-CM

## 2024-02-06 LAB
ALBUMIN SERPL BCP-MCNC: 4.5 G/DL (ref 3.5–5)
ALP SERPL-CCNC: 83 U/L (ref 34–104)
ALT SERPL W P-5'-P-CCNC: 21 U/L (ref 7–52)
ANION GAP SERPL CALCULATED.3IONS-SCNC: 9 MMOL/L
AST SERPL W P-5'-P-CCNC: 12 U/L (ref 13–39)
BILIRUB SERPL-MCNC: 0.44 MG/DL (ref 0.2–1)
BUN SERPL-MCNC: 17 MG/DL (ref 5–25)
CALCIUM SERPL-MCNC: 9.8 MG/DL (ref 8.4–10.2)
CHLORIDE SERPL-SCNC: 98 MMOL/L (ref 96–108)
CHOLEST SERPL-MCNC: 152 MG/DL
CO2 SERPL-SCNC: 30 MMOL/L (ref 21–32)
CREAT SERPL-MCNC: 1.05 MG/DL (ref 0.6–1.3)
CREAT UR-MCNC: 42.7 MG/DL
EST. AVERAGE GLUCOSE BLD GHB EST-MCNC: 214 MG/DL
GFR SERPL CREATININE-BSD FRML MDRD: 79 ML/MIN/1.73SQ M
GLUCOSE P FAST SERPL-MCNC: 328 MG/DL (ref 65–99)
HBA1C MFR BLD: 9.1 %
HDLC SERPL-MCNC: 38 MG/DL
LDLC SERPL DIRECT ASSAY-MCNC: 67 MG/DL (ref 0–100)
MICROALBUMIN UR-MCNC: <7 MG/L
MICROALBUMIN/CREAT 24H UR: <16 MG/G CREATININE (ref 0–30)
POTASSIUM SERPL-SCNC: 4.5 MMOL/L (ref 3.5–5.3)
PROT SERPL-MCNC: 7.4 G/DL (ref 6.4–8.4)
PSA SERPL-MCNC: 0.21 NG/ML (ref 0–4)
SODIUM SERPL-SCNC: 137 MMOL/L (ref 135–147)
TRIGL SERPL-MCNC: 482 MG/DL

## 2024-02-06 PROCEDURE — 99214 OFFICE O/P EST MOD 30 MIN: CPT | Performed by: FAMILY MEDICINE

## 2024-02-06 PROCEDURE — 36415 COLL VENOUS BLD VENIPUNCTURE: CPT

## 2024-02-06 PROCEDURE — G0103 PSA SCREENING: HCPCS

## 2024-02-06 PROCEDURE — 80061 LIPID PANEL: CPT

## 2024-02-06 PROCEDURE — 82570 ASSAY OF URINE CREATININE: CPT

## 2024-02-06 PROCEDURE — 83721 ASSAY OF BLOOD LIPOPROTEIN: CPT

## 2024-02-06 PROCEDURE — 83036 HEMOGLOBIN GLYCOSYLATED A1C: CPT

## 2024-02-06 PROCEDURE — 82043 UR ALBUMIN QUANTITATIVE: CPT

## 2024-02-06 PROCEDURE — 80053 COMPREHEN METABOLIC PANEL: CPT

## 2024-02-06 RX ORDER — ATORVASTATIN CALCIUM 20 MG/1
20 TABLET, FILM COATED ORAL EVERY EVENING
Qty: 30 TABLET | Refills: 3 | Status: SHIPPED | OUTPATIENT
Start: 2024-02-06

## 2024-02-06 RX ORDER — LISINOPRIL 20 MG/1
20 TABLET ORAL DAILY
Qty: 30 TABLET | Refills: 5 | Status: SHIPPED | OUTPATIENT
Start: 2024-02-06

## 2024-02-06 RX ORDER — SILDENAFIL 100 MG/1
100 TABLET, FILM COATED ORAL DAILY PRN
Qty: 10 TABLET | Refills: 5 | Status: SHIPPED | OUTPATIENT
Start: 2024-02-06

## 2024-02-06 RX ORDER — GABAPENTIN 600 MG/1
600 TABLET ORAL 2 TIMES DAILY
Qty: 60 TABLET | Refills: 5 | Status: SHIPPED | OUTPATIENT
Start: 2024-02-06

## 2024-02-06 RX ORDER — GLIPIZIDE 5 MG/1
5 TABLET, FILM COATED, EXTENDED RELEASE ORAL DAILY
Qty: 30 TABLET | Refills: 3 | Status: SHIPPED | OUTPATIENT
Start: 2024-02-06

## 2024-02-06 RX ORDER — ASPIRIN 81 MG/1
81 TABLET, CHEWABLE ORAL DAILY
Qty: 30 TABLET | Refills: 5 | Status: SHIPPED | OUTPATIENT
Start: 2024-02-06

## 2024-02-06 RX ORDER — AMLODIPINE BESYLATE 5 MG/1
5 TABLET ORAL DAILY
Qty: 30 TABLET | Refills: 5 | Status: SHIPPED | OUTPATIENT
Start: 2024-02-06

## 2024-02-06 RX ORDER — METHOCARBAMOL 750 MG/1
750 TABLET, FILM COATED ORAL EVERY 6 HOURS PRN
Qty: 20 TABLET | Refills: 0 | Status: SHIPPED | OUTPATIENT
Start: 2024-02-06 | End: 2024-02-11

## 2024-02-06 NOTE — ASSESSMENT & PLAN NOTE
Patient reports lump has been present for quite a while.  Has not changed in size.  CBC was done in December in the ER at [facility].  At that point, the white count was minimally elevated, and reviewing prior CBCs, it does show that the white count has been minimally elevated for quite a while.  Would recheck CBC, and then reevaluate.  Could consider further evaluation of this, with ENT.

## 2024-02-06 NOTE — ASSESSMENT & PLAN NOTE
BMI does continue to be above 30.  Continue with exercise, limit carbs, follow-up in the future.

## 2024-02-06 NOTE — ASSESSMENT & PLAN NOTE
Patient reports he does have hiatal hernia, as well as occasional GERD symptoms.  Overall, he treats symptomatically, and does not need to take something every day.  Continue with this treatment for the moment.  If he has more problems later on, would certainly consider follow-up with GI.

## 2024-02-06 NOTE — PROGRESS NOTES
Name: Jorge Hickey Jr.      : 1970      MRN: [id number]  Encounter Provider: Shawn Aguilar MD  Encounter Date: 2024   Encounter department: [facility] PRIMARY CARE    Assessment & Plan     1. Type 2 diabetes mellitus with diabetic polyneuropathy, without long-term current use of insulin (HCC)  Assessment & Plan:    Lab Results   Component Value Date    HGBA1C 8.9 (H) 2023   Last A1c was quite a bit elevated.  Continues on current medications.  Had blood work drawn this morning.  Will review via [employer]or[employer] and make adjustments at that point.    Orders:  -     aspirin 81 mg chewable tablet; Chew 1 tablet (81 mg total) daily  -     glipiZIDE (GLUCOTROL XL) 5 mg 24 hr tablet; Take 1 tablet (5 mg total) by mouth daily  -     Empagliflozin (Jardiance) 25 MG TABS; Take 1 tablet (25 mg total) by mouth every morning  -     semaglutide (Rybelsus) 14 MG tablet; Take 1 tablet (14 mg total) by mouth daily  -     metFORMIN (GLUCOPHAGE) 1000 MG tablet; Take 1 tablet (1,000 mg total) by mouth 2 (two) times a day with meals    2. Primary hypertension  Assessment & Plan:  Blood pressure today is doing quite well.  Continue current treatment.    Orders:  -     amLODIPine (NORVASC) 5 mg tablet; Take 1 tablet (5 mg total) by mouth daily  -     aspirin 81 mg chewable tablet; Chew 1 tablet (81 mg total) daily  -     lisinopril (ZESTRIL) 20 mg tablet; Take 1 tablet (20 mg total) by mouth daily  -     CBC and differential; Future; Expected date: 2024    3. Mixed hyperlipidemia  Assessment & Plan:  Due for lipid panel.  Was ordered and completed this morning, results pending.  Continue atorvastatin.  Reviewed via AorTx.    Orders:  -     atorvastatin (LIPITOR) 20 mg tablet; Take 1 tablet (20 mg total) by mouth every evening    4. Polyneuropathy associated with underlying disease (HCC)  Assessment & Plan:  Continues with some issues with peripheral neuropathy, though I am unsure that his leg  symptoms with rotation are related to that.  Question if there is also some arthritis aspect.  Patient has been trying ibuprofen at at bedtime, which would be quite reasonable to continue.  800 mg.  He also had used methocarbamol at 1 point, which he can continue to try.    Orders:  -     gabapentin (NEURONTIN) 600 MG tablet; Take 1 tablet (600 mg total) by mouth 2 (two) times a day    5. Muscle spasm  -     methocarbamol (Robaxin-750) 750 mg tablet; Take 1 tablet (750 mg total) by mouth every 6 (six) hours as needed for muscle spasms for up to 5 days    6. Lump in neck  Assessment & Plan:  Patient reports lump has been present for quite a while.  Has not changed in size.  CBC was done in December in the ER at [facility].  At that point, the white count was minimally elevated, and reviewing prior CBCs, it does show that the white count has been minimally elevated for quite a while.  Would recheck CBC, and then reevaluate.  Could consider further evaluation of this, with ENT.    Orders:  -     CBC and differential; Future; Expected date: 02/06/2024    7. Class 1 obesity due to excess calories with serious comorbidity and body mass index (BMI) of 32.0 to 32.9 in adult  Assessment & Plan:  BMI does continue to be above 30.  Continue with exercise, limit carbs, follow-up in the future.        8. Gastroesophageal reflux disease without esophagitis  Assessment & Plan:  Patient reports he does have hiatal hernia, as well as occasional GERD symptoms.  Overall, he treats symptomatically, and does not need to take something every day.  Continue with this treatment for the moment.  If he has more problems later on, would certainly consider follow-up with GI.      9. Other male erectile dysfunction  Assessment & Plan:  Recheck testosterone.  Health was on gel before.  Again, that seem to make the testosterone rise but did not necessarily change other effects.  Did not get Viagra previously from the pharmacy as the insurance did not  cover that.  Has new insurance now, and would consider new prescription.  Reviewed about testosterone as well as its issues with prostate stimulation.    Orders:  -     Testosterone; Future; Expected date: 02/06/2024  -     sildenafil (Viagra) 100 mg tablet; Take 1 tablet (100 mg total) by mouth daily as needed for erectile dysfunction           Subjective      Patient is here to follow-up on multiple issues.    Hypertension: Blood pressure reviewed.  Currently on lisinopril, amlodipine, Jardiance.    Diabetes: Last A1c was quite a bit elevated.  He is due for labs, which she got this morning.  They are pending at the time of the visit.  Currently on glipizide 5 mg extended release daily, Jardiance 25 mg, metformin 1000 mg twice a day, Rybelsus 14 mg.    Hyperlipidemia: Currently on atorvastatin 20 mg.  Again, he does have blood work pending at this time.    ED: Patient did try testosterone gel previously, but it did not seem to make a significant difference.  He got to a testosterone of 288, where previously it was in the 170s.  As far as function, did not make a difference.    Sometimes feels spasms in the legs and back.  Has some pain in the thigh with rotating the leg.  This is on the left.  From sitting to standing noted some problems with legs hurting.  No meds so far.  He was taking ibuprofen for the knees, but stopped it before.  Was 800mg at HS.            Review of Systems   Constitutional: Negative.    HENT: Negative.     Eyes: Negative.    Respiratory: Negative.     Cardiovascular: Negative.    Gastrointestinal: Negative.    Endocrine: Negative.    Genitourinary: Negative.    Musculoskeletal: Negative.    Skin: Negative.    Allergic/Immunologic: Negative.    Neurological: Negative.    Hematological: Negative.    Psychiatric/Behavioral: Negative.         Current Outpatient Medications on File Prior to Visit   Medication Sig   • [DISCONTINUED] atorvastatin (LIPITOR) 20 mg tablet TAKE 1 TABLET EVERY EVENING  "  • [DISCONTINUED] gabapentin (NEURONTIN) 600 MG tablet TAKE 1 TABLET TWICE A DAY   • [DISCONTINUED] glipiZIDE (GLUCOTROL XL) 5 mg 24 hr tablet Take 1 tablet (5 mg total) by mouth daily   • [DISCONTINUED] Jardiance 25 MG TABS TAKE 1 TABLET EVERY MORNING   • [DISCONTINUED] lisinopril (ZESTRIL) 20 mg tablet TAKE 1 TABLET DAILY   • [DISCONTINUED] metFORMIN (GLUCOPHAGE) 1000 MG tablet TAKE 1 TABLET TWICE A DAY WITH MEALS   • [DISCONTINUED] semaglutide (Rybelsus) 14 MG tablet Take 1 tablet (14 mg total) by mouth daily   • Testosterone 12.5 MG/ACT (1%) GEL Place 2 actuation on the skin in the morning (Patient not taking: Reported on 2/6/2024)   • [DISCONTINUED] amLODIPine (NORVASC) 5 mg tablet Take 1 tablet (5 mg total) by mouth daily   • [DISCONTINUED] aspirin 81 mg chewable tablet Chew 1 tablet (81 mg total) daily   • [DISCONTINUED] methocarbamol (Robaxin-750) 750 mg tablet Take 1 tablet (750 mg total) by mouth every 6 (six) hours as needed for muscle spasms for up to 5 days   • [DISCONTINUED] sildenafil (Viagra) 100 mg tablet Take 1 tablet (100 mg total) by mouth daily as needed for erectile dysfunction (Patient not taking: Reported on 2/6/2024)       Objective     /70 (BP Location: Left arm, Patient Position: Sitting, Cuff Size: Adult)   Pulse 104   Ht 5' 9\" (1.753 m)   Wt 101 kg (222 lb)   SpO2 97%   BMI 32.78 kg/m²     Physical Exam  Vitals and nursing note reviewed.   Constitutional:       Appearance: Normal appearance. He is well-developed.   HENT:      Head: Normocephalic and atraumatic.   Cardiovascular:      Rate and Rhythm: Normal rate and regular rhythm.      Pulses:           Carotid pulses are 2+ on the right side and 2+ on the left side.     Heart sounds: Normal heart sounds. No murmur heard.     No friction rub. No gallop.   Pulmonary:      Effort: Pulmonary effort is normal. No respiratory distress.      Breath sounds: Normal breath sounds. No wheezing or rales.   Musculoskeletal:      " Cervical back: Normal range of motion and neck supple.   Skin:         Neurological:      Mental Status: He is alert.       Shawn Aguilar MD

## 2024-02-06 NOTE — PATIENT INSTRUCTIONS
1. Type 2 diabetes mellitus with diabetic polyneuropathy, without long-term current use of insulin ([facility])  Assessment & Plan:    Lab Results   Component Value Date    HGBA1C 8.9 (H) 09/20/2023   Last A1c was quite a bit elevated.  Continues on current medications.  Had blood work drawn this morning.  Will review via [employer]at[employer] and make adjustments at that point.    Orders:  -     aspirin 81 mg chewable tablet; Chew 1 tablet (81 mg total) daily  -     glipiZIDE (GLUCOTROL XL) 5 mg 24 hr tablet; Take 1 tablet (5 mg total) by mouth daily  -     Empagliflozin (Jardiance) 25 MG TABS; Take 1 tablet (25 mg total) by mouth every morning  -     semaglutide (Rybelsus) 14 MG tablet; Take 1 tablet (14 mg total) by mouth daily  -     metFORMIN (GLUCOPHAGE) 1000 MG tablet; Take 1 tablet (1,000 mg total) by mouth 2 (two) times a day with meals    2. Primary hypertension  Assessment & Plan:  Blood pressure today is doing quite well.  Continue current treatment.    Orders:  -     amLODIPine (NORVASC) 5 mg tablet; Take 1 tablet (5 mg total) by mouth daily  -     aspirin 81 mg chewable tablet; Chew 1 tablet (81 mg total) daily  -     lisinopril (ZESTRIL) 20 mg tablet; Take 1 tablet (20 mg total) by mouth daily  -     CBC and differential; Future; Expected date: 02/06/2024    3. Mixed hyperlipidemia  Assessment & Plan:  Due for lipid panel.  Was ordered and completed this morning, results pending.  Continue atorvastatin.  Reviewed via Oxatis.    Orders:  -     atorvastatin (LIPITOR) 20 mg tablet; Take 1 tablet (20 mg total) by mouth every evening    4. Polyneuropathy associated with underlying disease (HCC)  Assessment & Plan:  Continues with some issues with peripheral neuropathy, though I am unsure that his leg symptoms with rotation are related to that.  Question if there is also some arthritis aspect.  Patient has been trying ibuprofen at at bedtime, which would be quite reasonable to continue.  800 mg.  He also had used methocarbamol  at 1 point, which he can continue to try.    Orders:  -     gabapentin (NEURONTIN) 600 MG tablet; Take 1 tablet (600 mg total) by mouth 2 (two) times a day    5. Muscle spasm  -     methocarbamol (Robaxin-750) 750 mg tablet; Take 1 tablet (750 mg total) by mouth every 6 (six) hours as needed for muscle spasms for up to 5 days    6. Lump in neck  Assessment & Plan:  Patient reports lump has been present for quite a while.  Has not changed in size.  CBC was done in December in the ER at [facility].  At that point, the white count was minimally elevated, and reviewing prior CBCs, it does show that the white count has been minimally elevated for quite a while.  Would recheck CBC, and then reevaluate.  Could consider further evaluation of this, with ENT.    Orders:  -     CBC and differential; Future; Expected date: 02/06/2024    7. Class 1 obesity due to excess calories with serious comorbidity and body mass index (BMI) of 32.0 to 32.9 in adult  Assessment & Plan:  BMI does continue to be above 30.  Continue with exercise, limit carbs, follow-up in the future.        8. Gastroesophageal reflux disease without esophagitis  Assessment & Plan:  Patient reports he does have hiatal hernia, as well as occasional GERD symptoms.  Overall, he treats symptomatically, and does not need to take something every day.  Continue with this treatment for the moment.  If he has more problems later on, would certainly consider follow-up with GI.      9. Other male erectile dysfunction  Assessment & Plan:  Recheck testosterone.  Health was on gel before.  Again, that seem to make the testosterone rise but did not necessarily change other effects.  Did not get Viagra previously from the pharmacy as the insurance did not cover that.  Has new insurance now, and would consider new prescription.  Reviewed about testosterone as well as its issues with prostate stimulation.    Orders:  -     Testosterone; Future; Expected date: 02/06/2024  -      sildenafil (Viagra) 100 mg tablet; Take 1 tablet (100 mg total) by mouth daily as needed for erectile dysfunction        COVID 19 Instructions    Jorge Hickey JrBoaz was advised to limit contact with others to essential tasks such as getting food, medications, and medical care.    Proper handwashing reviewed, and Hand sanitzer when washing is not available.    If the patient develops symptoms of COVID 19, the patient should call the office as soon as possible.    It is strongly recommended that Flu Vaccinations be obtained.      Virtual Visits:  [place]: This works on smart phones (any phone with Internet browsing capability).  You should get a text message when the provider is ready to see you.  Click on the link in the text message, and the call should start.  You will need to type in your name, and allow camera and microphone access.  This is HIPPA compliant, and secure.      If you have not already done so, get immunized to COVID 19.      We are committed to getting you vaccinated as soon as possible and will be closely following [facility] and [facility] guidelines as they are released and revised.  Please refer to our COVID-19 vaccine webpage for the most up to date information on the vaccine and our distribution efforts.    This site will also have the most up to date recommendations for COVID booster vaccine.    https://[email or web address].org/covid-19/protect-yourself/covid-[email or web address]-vaccine    Call [phone] ([phone]), option 7    You can also visit https://www.vaccines.gov/ to find vaccines in your area.    OUR LOCATION:    [facility]  [address] [place] [address], [address]  [place], [place], [address] [phone]  Fax: [phone]    Lab services, Rheumatology, and OB/GYN are at this location as well.

## 2024-02-06 NOTE — ASSESSMENT & PLAN NOTE
Due for lipid panel.  Was ordered and completed this morning, results pending.  Continue atorvastatin.  Reviewed via [employer].

## 2024-02-06 NOTE — ASSESSMENT & PLAN NOTE
Continues with some issues with peripheral neuropathy, though I am unsure that his leg symptoms with rotation are related to that.  Question if there is also some arthritis aspect.  Patient has been trying ibuprofen at at bedtime, which would be quite reasonable to continue.  800 mg.  He also had used methocarbamol at 1 point, which he can continue to try.

## 2024-02-06 NOTE — ASSESSMENT & PLAN NOTE
Recheck testosterone.  Health was on gel before.  Again, that seem to make the testosterone rise but did not necessarily change other effects.  Did not get Viagra previously from the pharmacy as the insurance did not cover that.  Has new insurance now, and would consider new prescription.  Reviewed about testosterone as well as its issues with prostate stimulation.

## 2024-02-06 NOTE — ASSESSMENT & PLAN NOTE
Lab Results   Component Value Date    HGBA1C 8.9 (H) 09/20/2023   Last A1c was quite a bit elevated.  Continues on current medications.  Had blood work drawn this morning.  Will review via [employer] and make adjustments at that point.

## 2024-02-07 NOTE — RESULT ENCOUNTER NOTE
Tests were not normal, and should follow at  your scheduled Office visit. Please call about this, if Foradian message is not available or not read by patient.

## 2024-05-24 DIAGNOSIS — E78.2 MIXED HYPERLIPIDEMIA: ICD-10-CM

## 2024-05-24 DIAGNOSIS — E11.42 TYPE 2 DIABETES MELLITUS WITH DIABETIC POLYNEUROPATHY, WITHOUT LONG-TERM CURRENT USE OF INSULIN (HCC): Primary | ICD-10-CM

## 2024-05-31 DIAGNOSIS — E78.2 MIXED HYPERLIPIDEMIA: ICD-10-CM

## 2024-05-31 DIAGNOSIS — E11.42 TYPE 2 DIABETES MELLITUS WITH DIABETIC POLYNEUROPATHY, WITHOUT LONG-TERM CURRENT USE OF INSULIN (HCC): ICD-10-CM

## 2024-05-31 RX ORDER — GLIPIZIDE 5 MG/1
5 TABLET, FILM COATED, EXTENDED RELEASE ORAL DAILY
Qty: 30 TABLET | Refills: 5 | Status: SHIPPED | OUTPATIENT
Start: 2024-05-31

## 2024-05-31 RX ORDER — ATORVASTATIN CALCIUM 20 MG/1
TABLET, FILM COATED ORAL
Qty: 30 TABLET | Refills: 5 | Status: SHIPPED | OUTPATIENT
Start: 2024-05-31

## 2024-07-30 DIAGNOSIS — G63 POLYNEUROPATHY ASSOCIATED WITH UNDERLYING DISEASE (HCC): ICD-10-CM

## 2024-07-30 DIAGNOSIS — I10 PRIMARY HYPERTENSION: ICD-10-CM

## 2024-07-30 DIAGNOSIS — E11.42 TYPE 2 DIABETES MELLITUS WITH DIABETIC POLYNEUROPATHY, WITHOUT LONG-TERM CURRENT USE OF INSULIN (HCC): ICD-10-CM

## 2024-07-30 RX ORDER — GABAPENTIN 600 MG/1
TABLET ORAL
Qty: 60 TABLET | Refills: 5 | Status: SHIPPED | OUTPATIENT
Start: 2024-07-30

## 2024-07-30 RX ORDER — EMPAGLIFLOZIN 25 MG/1
TABLET, FILM COATED ORAL
Qty: 30 TABLET | Refills: 5 | Status: SHIPPED | OUTPATIENT
Start: 2024-07-30

## 2024-07-30 RX ORDER — ASPIRIN 81 MG/1
TABLET, CHEWABLE ORAL
Qty: 30 TABLET | Refills: 5 | Status: SHIPPED | OUTPATIENT
Start: 2024-07-30

## 2024-09-13 ENCOUNTER — VBI (OUTPATIENT)
Dept: ADMINISTRATIVE | Facility: OTHER | Age: 54
End: 2024-09-13

## 2024-09-13 NOTE — TELEPHONE ENCOUNTER
09/13/24 2:36 PM     Chart reviewed for Diabetic Eye Exam was/were submitted to the patient's insurance.     Laurence Weiss   [facility]

## 2024-09-24 DIAGNOSIS — I10 PRIMARY HYPERTENSION: ICD-10-CM

## 2024-09-24 DIAGNOSIS — E11.42 TYPE 2 DIABETES MELLITUS WITH DIABETIC POLYNEUROPATHY, WITHOUT LONG-TERM CURRENT USE OF INSULIN (HCC): ICD-10-CM

## 2024-09-24 RX ORDER — LISINOPRIL 20 MG/1
20 TABLET ORAL DAILY
Qty: 30 TABLET | Refills: 0 | Status: SHIPPED | OUTPATIENT
Start: 2024-09-24

## 2024-09-24 RX ORDER — AMLODIPINE BESYLATE 5 MG/1
5 TABLET ORAL DAILY
Qty: 30 TABLET | Refills: 0 | Status: SHIPPED | OUTPATIENT
Start: 2024-09-24

## 2024-09-24 NOTE — TELEPHONE ENCOUNTER
Called pt and left an vm in regards that pt medications have been refilled, pt will need to schedule an appt as needed for next refill.     If pt calls back please schedule as needed.     Thank you.   Wendy.

## 2024-09-30 ENCOUNTER — VBI (OUTPATIENT)
Dept: ADMINISTRATIVE | Facility: OTHER | Age: 54
End: 2024-09-30

## 2024-09-30 NOTE — TELEPHONE ENCOUNTER
09/30/24 12:05 PM     Chart reviewed for Hemoglobin A1c ; nothing is submitted to the patient's insurance at this time.     Reinaldo De MA   [facility]

## 2024-11-21 NOTE — ASSESSMENT & PLAN NOTE
Blood pressure today was slightly elevated, however again I would not start medications today  Limit sodium, increase exercise  Recheck with next blood work 
Lab Results   Component Value Date    HGBA1C 9 8 (A) 12/10/2020   Last A1c was quite a bit elevated, but he does have blood work ordered, and will be going today to get that done  After that, can have phone call to see if we need to adjust any medications  He mentioned that he has seen a decrease in his blood sugar when he went on the Januvia, and continues on glipizide  Continue to limit carbohydrate intake, increase exercise, consider diabetic Education 
Reflux seems to be doing quite well at this point  Not currently on medications  Will evaluate the future 
Renewed Cialis  Patient tolerating 
None

## 2024-11-27 DIAGNOSIS — E11.42 TYPE 2 DIABETES MELLITUS WITH DIABETIC POLYNEUROPATHY, WITHOUT LONG-TERM CURRENT USE OF INSULIN (HCC): ICD-10-CM

## 2024-11-27 RX ORDER — GLIPIZIDE 5 MG/1
5 TABLET, FILM COATED, EXTENDED RELEASE ORAL DAILY
Qty: 30 TABLET | Refills: 5 | OUTPATIENT
Start: 2024-11-27

## 2024-11-27 NOTE — TELEPHONE ENCOUNTER
Requested Prescriptions     Pending Prescriptions Disp Refills    glipiZIDE (GLUCOTROL XL) 5 mg 24 hr tablet [Pharmacy Med Name: GLIPIZIDE ER 5MG TABLETS] 30 tablet 5     Sig: TAKE 1 TABLET(5 MG) BY MOUTH DAILY      LOV 2/6/24 F/U Non Scheduled, Labs Active

## 2024-12-12 ENCOUNTER — TELEPHONE (OUTPATIENT)
Dept: FAMILY MEDICINE CLINIC | Facility: CLINIC | Age: 54
End: 2024-12-12

## 2024-12-12 NOTE — TELEPHONE ENCOUNTER
On 7/31/2024 patient established care with Beverly Nguyen, [facility]   [address]   [address]   [place] [address]   Phone: [phone]   Fax: [phone]

## 2024-12-27 NOTE — TELEPHONE ENCOUNTER
12/27/24 1:00 PM        The office's request has been received, reviewed, and the patient chart updated. The PCP has successfully been removed with a patient attribution note. This message will now be completed.        Thank you  Lottie Corona

## 2025-01-25 DIAGNOSIS — I10 PRIMARY HYPERTENSION: ICD-10-CM

## 2025-01-25 DIAGNOSIS — E11.42 TYPE 2 DIABETES MELLITUS WITH DIABETIC POLYNEUROPATHY, WITHOUT LONG-TERM CURRENT USE OF INSULIN (HCC): ICD-10-CM

## 2025-01-27 DIAGNOSIS — I10 PRIMARY HYPERTENSION: ICD-10-CM

## 2025-01-27 DIAGNOSIS — G63 POLYNEUROPATHY ASSOCIATED WITH UNDERLYING DISEASE (HCC): ICD-10-CM

## 2025-01-27 DIAGNOSIS — E11.42 TYPE 2 DIABETES MELLITUS WITH DIABETIC POLYNEUROPATHY, WITHOUT LONG-TERM CURRENT USE OF INSULIN (HCC): ICD-10-CM

## 2025-01-27 RX ORDER — ASPIRIN 81 MG/1
TABLET, CHEWABLE ORAL
Qty: 30 TABLET | Refills: 5 | OUTPATIENT
Start: 2025-01-27

## 2025-01-28 RX ORDER — GABAPENTIN 600 MG/1
TABLET ORAL
Qty: 60 TABLET | Refills: 5 | OUTPATIENT
Start: 2025-01-28

## 2025-01-28 RX ORDER — AMLODIPINE BESYLATE 5 MG/1
5 TABLET ORAL DAILY
Qty: 30 TABLET | Refills: 0 | OUTPATIENT
Start: 2025-01-28

## 2025-01-28 RX ORDER — LISINOPRIL 20 MG/1
20 TABLET ORAL DAILY
Qty: 30 TABLET | Refills: 0 | OUTPATIENT
Start: 2025-01-28

## 2025-01-28 RX ORDER — EMPAGLIFLOZIN 25 MG/1
TABLET, FILM COATED ORAL
Qty: 30 TABLET | Refills: 5 | OUTPATIENT
Start: 2025-01-28

## 2025-01-28 NOTE — TELEPHONE ENCOUNTER
Patient has changed to a different provider.  Will need to get his medications from that office.   Griseofulvin Pregnancy And Lactation Text: This medication is Pregnancy Category X and is known to cause serious birth defects. It is unknown if this medication is excreted in breast milk but breast feeding should be avoided.